# Patient Record
Sex: FEMALE | Race: WHITE | NOT HISPANIC OR LATINO | Employment: OTHER | URBAN - METROPOLITAN AREA
[De-identification: names, ages, dates, MRNs, and addresses within clinical notes are randomized per-mention and may not be internally consistent; named-entity substitution may affect disease eponyms.]

---

## 2017-07-31 DIAGNOSIS — D64.9 ANEMIA, UNSPECIFIED TYPE: Primary | ICD-10-CM

## 2017-08-02 ENCOUNTER — TELEPHONE (OUTPATIENT)
Dept: FAMILY MEDICINE CLINIC | Facility: CLINIC | Age: 72
End: 2017-08-02

## 2017-08-02 NOTE — TELEPHONE ENCOUNTER
Spoke with patient she is going to [place] in [facility] to do the additional test

## 2017-08-07 RX ORDER — ATENOLOL AND CHLORTHALIDONE TABLET 50; 25 MG/1; MG/1
TABLET ORAL
Qty: 45 TABLET | Refills: 1 | Status: SHIPPED | OUTPATIENT
Start: 2017-08-07 | End: 2018-04-18 | Stop reason: SDUPTHER

## 2017-08-08 ENCOUNTER — TELEPHONE (OUTPATIENT)
Dept: FAMILY MEDICINE CLINIC | Facility: CLINIC | Age: 72
End: 2017-08-08

## 2017-08-09 DIAGNOSIS — Z12.11 COLON CANCER SCREENING: Primary | ICD-10-CM

## 2017-08-11 ENCOUNTER — LAB (OUTPATIENT)
Dept: LAB | Facility: HOSPITAL | Age: 72
End: 2017-08-11

## 2017-08-11 DIAGNOSIS — Z12.11 COLON CANCER SCREENING: ICD-10-CM

## 2017-08-11 LAB
COLLECT DATE SP2 STL: NORMAL
COLLECT DATE SP3 STL: NORMAL
COLLECT DATE STL: NORMAL
HEMOCCULT STL QL: NEGATIVE
Lab: 1100
Lab: 1100
Lab: 800

## 2017-08-11 PROCEDURE — 82270 OCCULT BLOOD FECES: CPT | Performed by: INTERNAL MEDICINE

## 2018-01-31 ENCOUNTER — TELEPHONE (OUTPATIENT)
Dept: FAMILY MEDICINE CLINIC | Facility: CLINIC | Age: 73
End: 2018-01-31

## 2018-01-31 NOTE — TELEPHONE ENCOUNTER
[facility]    [phone]  [phone]      OMEPRAZOLE 20 MG ONCE DAILY    AUTO RENEW  90 QTY PLEASE

## 2018-01-31 NOTE — TELEPHONE ENCOUNTER
Pt not seen in over a year.  Advised will need an appointment for refills.  She states she will call back to schedule at another time.

## 2018-02-09 ENCOUNTER — TELEPHONE (OUTPATIENT)
Dept: FAMILY MEDICINE CLINIC | Facility: CLINIC | Age: 73
End: 2018-02-09

## 2018-02-09 NOTE — TELEPHONE ENCOUNTER
Received request from [facility] for refill on Atenolol.  Called pt to schedue appointment.  She requested we not send in refills.    She will call at later date to get appointment and will fill at that time.  Pt states she has a supply still.

## 2018-04-18 ENCOUNTER — LAB (OUTPATIENT)
Dept: LAB | Facility: HOSPITAL | Age: 73
End: 2018-04-18

## 2018-04-18 ENCOUNTER — OFFICE VISIT (OUTPATIENT)
Dept: FAMILY MEDICINE CLINIC | Facility: CLINIC | Age: 73
End: 2018-04-18

## 2018-04-18 DIAGNOSIS — D64.9 ANEMIA, UNSPECIFIED TYPE: Primary | ICD-10-CM

## 2018-04-18 DIAGNOSIS — R73.01 IMPAIRED FASTING GLUCOSE: ICD-10-CM

## 2018-04-18 DIAGNOSIS — E66.9 OBESITY (BMI 30-39.9): ICD-10-CM

## 2018-04-18 DIAGNOSIS — I10 HYPERTENSION, UNSPECIFIED TYPE: ICD-10-CM

## 2018-04-18 DIAGNOSIS — M06.9 RHEUMATOID ARTHRITIS, INVOLVING UNSPECIFIED SITE, UNSPECIFIED RHEUMATOID FACTOR PRESENCE: ICD-10-CM

## 2018-04-18 DIAGNOSIS — D64.9 ANEMIA, UNSPECIFIED TYPE: ICD-10-CM

## 2018-04-18 DIAGNOSIS — N18.30 CKD (CHRONIC KIDNEY DISEASE), STAGE III (HCC): ICD-10-CM

## 2018-04-18 LAB
DEPRECATED RDW RBC AUTO: 58.1 FL (ref 37–54)
ERYTHROCYTE [DISTWIDTH] IN BLOOD BY AUTOMATED COUNT: 20.1 % (ref 11.3–14.5)
FERRITIN SERPL-MCNC: 8 NG/ML (ref 10–291)
HCT VFR BLD AUTO: 34.9 % (ref 34.5–44)
HGB BLD-MCNC: 10.2 G/DL (ref 11.5–15.5)
IRON 24H UR-MRATE: 21 MCG/DL (ref 50–175)
IRON SATN MFR SERPL: 5 % (ref 15–50)
MCH RBC QN AUTO: 23.3 PG (ref 27–31)
MCHC RBC AUTO-ENTMCNC: 29.2 G/DL (ref 32–36)
MCV RBC AUTO: 79.7 FL (ref 80–99)
PLATELET # BLD AUTO: 328 10*3/MM3 (ref 150–450)
PMV BLD AUTO: 10.4 FL (ref 6–12)
RBC # BLD AUTO: 4.38 10*6/MM3 (ref 3.89–5.14)
TIBC SERPL-MCNC: 387 MCG/DL (ref 250–450)
WBC NRBC COR # BLD: 6.44 10*3/MM3 (ref 3.5–10.8)

## 2018-04-18 PROCEDURE — 83540 ASSAY OF IRON: CPT

## 2018-04-18 PROCEDURE — 83550 IRON BINDING TEST: CPT

## 2018-04-18 PROCEDURE — 36415 COLL VENOUS BLD VENIPUNCTURE: CPT

## 2018-04-18 PROCEDURE — 85027 COMPLETE CBC AUTOMATED: CPT

## 2018-04-18 PROCEDURE — 82728 ASSAY OF FERRITIN: CPT

## 2018-04-18 PROCEDURE — 99214 OFFICE O/P EST MOD 30 MIN: CPT | Performed by: INTERNAL MEDICINE

## 2018-04-18 RX ORDER — OMEPRAZOLE 20 MG/1
20 CAPSULE, DELAYED RELEASE ORAL DAILY
Qty: 90 CAPSULE | Refills: 1 | Status: SHIPPED | OUTPATIENT
Start: 2018-04-18 | End: 2018-10-09 | Stop reason: SDUPTHER

## 2018-04-18 RX ORDER — FOLIC ACID 1 MG/1
1 TABLET ORAL DAILY
Refills: 0 | COMMUNITY
Start: 2018-03-01

## 2018-04-18 RX ORDER — MELATONIN
1000 DAILY
COMMUNITY
End: 2019-10-21

## 2018-04-18 RX ORDER — VITAMIN E 268 MG
400 CAPSULE ORAL DAILY
COMMUNITY
End: 2019-01-16

## 2018-04-18 RX ORDER — SENNOSIDES 8.6 MG
650 CAPSULE ORAL 2 TIMES DAILY
Status: ON HOLD | COMMUNITY
End: 2022-08-07 | Stop reason: SDUPTHER

## 2018-04-18 RX ORDER — ATENOLOL AND CHLORTHALIDONE TABLET 50; 25 MG/1; MG/1
0.5 TABLET ORAL DAILY
Qty: 45 TABLET | Refills: 1 | Status: SHIPPED | OUTPATIENT
Start: 2018-04-18 | End: 2018-11-01 | Stop reason: SDUPTHER

## 2018-04-18 RX ORDER — HYDROXYCHLOROQUINE SULFATE 200 MG/1
200 TABLET, FILM COATED ORAL 2 TIMES DAILY
Refills: 1 | COMMUNITY
Start: 2018-03-28

## 2018-04-18 RX ORDER — OMEPRAZOLE 20 MG/1
20 CAPSULE, DELAYED RELEASE ORAL DAILY
COMMUNITY
End: 2018-04-18 | Stop reason: SDUPTHER

## 2018-04-18 NOTE — PROGRESS NOTES
73F here to establish care.    Current issues:  -iron def anemia - told by her rheumatologist's office last couple of times bloodowork done.  8/17 occult stool cards x 3 done and negative.  No melena or blood in stool, gerd, dyspepsia, abd pain, n/v  Has fatigue, peeling nails, and chews on ice often (x years)  No etoh or nsaids    -htn - on losartan in past - bp went low, cut med down to not taking it at all  Home readings: 120s/60s  Taking 1/2 tab of the atenolol-chorthalidone    -RA - est with Dr. Diamond, maintained with good control on methotrexate and plaquenil  Eye exam utd  Labs done 3/18    Review of Systems   General: + fatigue, no fever/chills, unintentional wt loss, malaise, night sweats  Skin: no rash, no hives, no lesions,   Eyes: no visual disturbance   Heme: no brusing, no bleeding  ENT: no hearing loss, no dizziness, no nosebleed, no hoarseness  Endocrine: , no polyuria, polyphagia, polydipsia, no heat or cold intolerance  GI: no nausea, no vomiting, no diarrhea, no constipation, no bleeding, no pain  : no dysuria, no urinary frequency,, no hematuria, or incontinence  Extremities: no edema, , no claudication  Cardiac: no chest pain, no palpitations, no orthopnea, no PND  Respiratory: no cough, no sputum, no wheezing, no sob , no hemoptysis  Neuro: no headache, no seizure,, no paresthesias or weakness  Psych: no anxiety, no depression          Patient Active Problem List    Diagnosis   • Obesity (BMI 30-39.9) [E66.9]   • CKD (chronic kidney disease), stage III [N18.3]   • Rheumatoid arthritis [M06.9]     Overview Note:     4/18:RA - Dr. Diamond - 2010 dx - all joints hurt, shoulders most - methotrexate  Plaquenil added 2016. Sx are well controlled      • Osteoarthritis [M19.90]   • Impaired fasting glucose [R73.01]   • Hypertension [I10]   • GERD (gastroesophageal reflux disease) [K21.9]     Overview Note:     omeprazole     • Esophageal stricture [K22.2]   • Asymptomatic bacteriuria [R82.71]     Past  "Surgical History:   Procedure Laterality Date   • REPLACEMENT TOTAL KNEE BILATERAL      2004, 2008   • TOTAL HIP ARTHROPLASTY  2014    right     Current Outpatient Prescriptions   Medication Sig Dispense Refill   • acetaminophen (TYLENOL) 650 MG 8 hr tablet Take 650 mg by mouth 3 (Three) Times a Day As Needed for Mild Pain .     • atenolol-chlorthalidone (TENORETIC) 50-25 MG per tablet Take 0.5 tablets by mouth Daily. 45 tablet 1   • cholecalciferol (VITAMIN D3) 1000 units tablet Take 1,000 Units by mouth Daily.     • folic acid (FOLVITE) 1 MG tablet Take 1 mg by mouth Daily.  0   • hydroxychloroquine (PLAQUENIL) 200 MG tablet 200 mg 2 (Two) Times a Day.  1   • methotrexate 2.5 MG tablet Take 15 mg by mouth 1 (One) Time Per Week.  1   • omeprazole (priLOSEC) 20 MG capsule Take 1 capsule by mouth Daily. 90 capsule 1   • vitamin E 400 UNIT capsule Take 400 Units by mouth Daily.       No current facility-administered medications for this visit.      Allergies   Allergen Reactions   • Tramadol Nausea Only     Social History     Social History   • Marital status:      Spouse name: [facility]   • Number of children: 2     Occupational History   • Retired           Social History Main Topics   • Smoking status: Former Smoker     Packs/day: 1.00     Years: 20.00   • Smokeless tobacco: Former User     Quit date: 4/18/1985   • Alcohol use Yes      Comment: rare    • Drug use: No     Other Topics Concern   • Not on file     Social History Narrative    4/18:     to [facility] 45yrs    2 kids:    Yamil Muro    5 gk    Hobbies: grandkids    Exercise: none     Family History   Problem Relation Age of Onset   • Stroke Mother    • Lung cancer Father    • No Known Problems Sister      /86   Pulse 64   Ht 175.3 cm (69\")   Wt 122 kg (270 lb)   SpO2 99%   BMI 39.87 kg/m²   Gen: well appearing in nad, no resp effort  Eyes: conjunctiva clear, perrl, eomi  ENT: mmm, no thyromegaly, no " lymphadenopathy  CV: s1, s2 reg no m/r/g, no bruits, no jvd  No peripheral edema, pedal pulses intact  Resp:  clear b/l no w/r/r  GI:  soft nt/nd  Skin: no clubbing or cyanosis  Neuro: no focal deficits.      A/P    1. Anemia, unspecified type    2. Hypertension, unspecified type    3. Obesity (BMI 30-39.9)    4. Impaired fasting glucose    5. CKD (chronic kidney disease), stage III    6. Rheumatoid arthritis, involving unspecified site, unspecified rheumatoid factor presence        Labs today to further assess iron deficiency anemia/ confirmation  Will obtaine prior rheum labs for comparison    BP well controlled at home - no change in med mgmt at this time, pt iwll bring cuff next visit here    Fasting labs in 6mo at [facility]    Counseled on ckdIII , control of bp, prevention of diabetes (pt prediabetic)  Counseled on importance of exercise, wt loss    RA stable on current meds.

## 2018-04-19 VITALS
BODY MASS INDEX: 36.14 KG/M2 | WEIGHT: 244 LBS | OXYGEN SATURATION: 99 % | HEIGHT: 69 IN | HEART RATE: 64 BPM | SYSTOLIC BLOOD PRESSURE: 138 MMHG | DIASTOLIC BLOOD PRESSURE: 86 MMHG

## 2018-07-16 RX ORDER — FERROUS GLUCONATE 324(37.5)
TABLET ORAL 2 TIMES DAILY WITH MEALS
COMMUNITY
Start: 2018-04-19 | End: 2019-10-21

## 2018-07-17 ENCOUNTER — LAB (OUTPATIENT)
Dept: LAB | Facility: HOSPITAL | Age: 73
End: 2018-07-17

## 2018-07-17 ENCOUNTER — OFFICE VISIT (OUTPATIENT)
Dept: FAMILY MEDICINE CLINIC | Facility: CLINIC | Age: 73
End: 2018-07-17

## 2018-07-17 VITALS
SYSTOLIC BLOOD PRESSURE: 144 MMHG | DIASTOLIC BLOOD PRESSURE: 70 MMHG | WEIGHT: 244 LBS | BODY MASS INDEX: 36.03 KG/M2 | RESPIRATION RATE: 16 BRPM | HEART RATE: 56 BPM

## 2018-07-17 DIAGNOSIS — D50.9 IRON DEFICIENCY ANEMIA, UNSPECIFIED IRON DEFICIENCY ANEMIA TYPE: ICD-10-CM

## 2018-07-17 DIAGNOSIS — N18.30 CKD (CHRONIC KIDNEY DISEASE), STAGE III (HCC): ICD-10-CM

## 2018-07-17 DIAGNOSIS — I10 HYPERTENSION, UNSPECIFIED TYPE: ICD-10-CM

## 2018-07-17 DIAGNOSIS — D64.9 ANEMIA, UNSPECIFIED TYPE: ICD-10-CM

## 2018-07-17 DIAGNOSIS — M06.9 RHEUMATOID ARTHRITIS, INVOLVING UNSPECIFIED SITE, UNSPECIFIED RHEUMATOID FACTOR PRESENCE: Primary | ICD-10-CM

## 2018-07-17 LAB
ALBUMIN SERPL-MCNC: 3.98 G/DL (ref 3.2–4.8)
ALBUMIN/GLOB SERPL: 1.8 G/DL (ref 1.5–2.5)
ALP SERPL-CCNC: 59 U/L (ref 25–100)
ALT SERPL W P-5'-P-CCNC: 21 U/L (ref 7–40)
ANION GAP SERPL CALCULATED.3IONS-SCNC: 9 MMOL/L (ref 3–11)
AST SERPL-CCNC: 25 U/L (ref 0–33)
BASOPHILS # BLD AUTO: 0.04 10*3/MM3 (ref 0–0.2)
BASOPHILS NFR BLD AUTO: 0.8 % (ref 0–1)
BILIRUB SERPL-MCNC: 0.6 MG/DL (ref 0.3–1.2)
BUN BLD-MCNC: 12 MG/DL (ref 9–23)
BUN/CREAT SERPL: 12.5 (ref 7–25)
CALCIUM SPEC-SCNC: 10 MG/DL (ref 8.7–10.4)
CHLORIDE SERPL-SCNC: 101 MMOL/L (ref 99–109)
CO2 SERPL-SCNC: 30 MMOL/L (ref 20–31)
CREAT BLD-MCNC: 0.96 MG/DL (ref 0.6–1.3)
DEPRECATED RDW RBC AUTO: 61.5 FL (ref 37–54)
EOSINOPHIL # BLD AUTO: 0.1 10*3/MM3 (ref 0–0.3)
EOSINOPHIL NFR BLD AUTO: 2 % (ref 0–3)
ERYTHROCYTE [DISTWIDTH] IN BLOOD BY AUTOMATED COUNT: 18.9 % (ref 11.3–14.5)
FERRITIN SERPL-MCNC: 37 NG/ML (ref 10–291)
GFR SERPL CREATININE-BSD FRML MDRD: 57 ML/MIN/1.73
GLOBULIN UR ELPH-MCNC: 2.2 GM/DL
GLUCOSE BLD-MCNC: 99 MG/DL (ref 70–100)
HCT VFR BLD AUTO: 42.2 % (ref 34.5–44)
HGB BLD-MCNC: 12.9 G/DL (ref 11.5–15.5)
IMM GRANULOCYTES # BLD: 0 10*3/MM3 (ref 0–0.03)
IMM GRANULOCYTES NFR BLD: 0 % (ref 0–0.6)
IRON 24H UR-MRATE: 112 MCG/DL (ref 50–175)
IRON SATN MFR SERPL: 37 % (ref 15–50)
LYMPHOCYTES # BLD AUTO: 0.9 10*3/MM3 (ref 0.6–4.8)
LYMPHOCYTES NFR BLD AUTO: 18.3 % (ref 24–44)
MCH RBC QN AUTO: 27.5 PG (ref 27–31)
MCHC RBC AUTO-ENTMCNC: 30.6 G/DL (ref 32–36)
MCV RBC AUTO: 90 FL (ref 80–99)
MONOCYTES # BLD AUTO: 0.22 10*3/MM3 (ref 0–1)
MONOCYTES NFR BLD AUTO: 4.5 % (ref 0–12)
NEUTROPHILS # BLD AUTO: 3.66 10*3/MM3 (ref 1.5–8.3)
NEUTROPHILS NFR BLD AUTO: 74.4 % (ref 41–71)
PLATELET # BLD AUTO: 206 10*3/MM3 (ref 150–450)
PMV BLD AUTO: 10.9 FL (ref 6–12)
POTASSIUM BLD-SCNC: 4.2 MMOL/L (ref 3.5–5.5)
PROT SERPL-MCNC: 6.2 G/DL (ref 5.7–8.2)
RBC # BLD AUTO: 4.69 10*6/MM3 (ref 3.89–5.14)
SODIUM BLD-SCNC: 140 MMOL/L (ref 132–146)
TIBC SERPL-MCNC: 299 MCG/DL (ref 250–450)
WBC NRBC COR # BLD: 4.92 10*3/MM3 (ref 3.5–10.8)

## 2018-07-17 PROCEDURE — 82728 ASSAY OF FERRITIN: CPT

## 2018-07-17 PROCEDURE — 36415 COLL VENOUS BLD VENIPUNCTURE: CPT

## 2018-07-17 PROCEDURE — 99214 OFFICE O/P EST MOD 30 MIN: CPT | Performed by: INTERNAL MEDICINE

## 2018-07-17 PROCEDURE — 80053 COMPREHEN METABOLIC PANEL: CPT

## 2018-07-17 PROCEDURE — 83550 IRON BINDING TEST: CPT

## 2018-07-17 PROCEDURE — 85025 COMPLETE CBC W/AUTO DIFF WBC: CPT

## 2018-07-17 PROCEDURE — 83540 ASSAY OF IRON: CPT

## 2018-07-17 NOTE — PROGRESS NOTES
73/F here for f/u iron def anemia.  Told anemic by rheum back in 3/18 - labs scanned, mild.  We did labs in 4/18 c/w iron deficiency.  ADvised colonoscopy, pt declines.  Started on iron supplement ferrous gluconate bid and tolerating.  Feels more energy on this.          Review of Systems   Constitutional: Positive for fatigue. Negative for chills, fever and unexpected weight loss.   HENT: Positive for postnasal drip.    Eyes: Negative for visual disturbance.   Respiratory: Negative for cough, chest tightness, shortness of breath and wheezing.    Cardiovascular: Positive for leg swelling. Negative for chest pain and palpitations.   Gastrointestinal: Positive for diarrhea. Negative for abdominal pain, blood in stool, constipation, nausea, vomiting and GERD.   Genitourinary: Negative for urinary incontinence, frequency, hematuria and urgency.   Musculoskeletal: Positive for arthralgias. Negative for joint swelling, myalgias and neck pain.   Skin: Negative.    Neurological: Negative for dizziness and weakness.   Psychiatric/Behavioral: Negative for sleep disturbance and depressed mood. The patient is not nervous/anxious.      Patient Active Problem List   Diagnosis   • Rheumatoid arthritis (CMS/HCC)   • Osteoarthritis   • Impaired fasting glucose   • Hypertension   • GERD (gastroesophageal reflux disease)   • Esophageal stricture   • Asymptomatic bacteriuria   • Obesity (BMI 30-39.9)   • CKD (chronic kidney disease), stage III      Past Surgical History:   Procedure Laterality Date   • REPLACEMENT TOTAL KNEE BILATERAL      2004, 2008   • TOTAL HIP ARTHROPLASTY  2014    right        Current Outpatient Prescriptions:   •  acetaminophen (TYLENOL) 650 MG 8 hr tablet, Take 650 mg by mouth 3 (Three) Times a Day As Needed for Mild Pain ., Disp: , Rfl:   •  aspirin 81 MG oral suspension, , Disp: , Rfl:   •  atenolol-chlorthalidone (TENORETIC) 50-25 MG per tablet, Take 0.5 tablets by mouth Daily., Disp: 45 tablet, Rfl: 1  •   cholecalciferol (VITAMIN D3) 1000 units tablet, Take 1,000 Units by mouth Daily., Disp: , Rfl:   •  Cyanocobalamin (VITAMIN B-12) 6000 MCG sublingual tablet, , Disp: , Rfl:   •  ferrous gluconate 324 (37.5 Fe) MG tablet tablet, , Disp: , Rfl:   •  folic acid (FOLVITE) 1 MG tablet, Take 1 mg by mouth Daily., Disp: , Rfl: 0  •  hydroxychloroquine (PLAQUENIL) 200 MG tablet, 200 mg 2 (Two) Times a Day., Disp: , Rfl: 1  •  methotrexate 2.5 MG tablet, Take 15 mg by mouth 1 (One) Time Per Week., Disp: , Rfl: 1  •  omeprazole (priLOSEC) 20 MG capsule, Take 1 capsule by mouth Daily., Disp: 90 capsule, Rfl: 1  •  vitamin E 400 UNIT capsule, Take 400 Units by mouth Daily., Disp: , Rfl:    Allergies   Allergen Reactions   • Tramadol Nausea Only     Social History     Social History   • Marital status:      Spouse name: [facility]   • Number of children: 2   • Years of education: N/A     Occupational History   • Retired           Social History Main Topics   • Smoking status: Former Smoker     Packs/day: 1.00     Years: 20.00   • Smokeless tobacco: Former User     Quit date: 4/18/1985   • Alcohol use Yes      Comment: rare    • Drug use: No   • Sexual activity: Not on file     Other Topics Concern   • Not on file     Social History Narrative    4/18:     to [facility] 45yrs    2 kids:    Yamil Muro    5 gk    Hobbies: grandkids    Exercise: none      Family History   Problem Relation Age of Onset   • Stroke Mother    • Lung cancer Father    • No Known Problems Sister       /70 (BP Location: Left arm, Patient Position: Sitting)   Pulse 56 Comment: regular  Resp 16 Comment: normal  Wt 111 kg (244 lb)   BMI 36.03 kg/m²   Gen: well appearing in nad, no resp effort  Eyes: conjunctiva clear, perrl, eomi  ENT: mmm, no thyromegaly, no lymphadenopathy  CV: s1, s2 reg no m/r/g, no bruits, no jvd  No peripheral edema, pedal pulses intact  Resp:  clear b/l no w/r/r  GI:  soft nt/nd  Skin: no clubbing  or cyanosis  Neuro: no focal deficits.    WBC   Date Value Ref Range Status   04/18/2018 6.44 3.50 - 10.80 10*3/mm3 Final     RBC   Date Value Ref Range Status   04/18/2018 4.38 3.89 - 5.14 10*6/mm3 Final     Hemoglobin   Date Value Ref Range Status   04/18/2018 10.2 (L) 11.5 - 15.5 g/dL Final     Hematocrit   Date Value Ref Range Status   04/18/2018 34.9 34.5 - 44.0 % Final     MCV   Date Value Ref Range Status   04/18/2018 79.7 (L) 80.0 - 99.0 fL Final     MCH   Date Value Ref Range Status   04/18/2018 23.3 (L) 27.0 - 31.0 pg Final     MCHC   Date Value Ref Range Status   04/18/2018 29.2 (L) 32.0 - 36.0 g/dL Final     RDW   Date Value Ref Range Status   04/18/2018 20.1 (H) 11.3 - 14.5 % Final     RDW-SD   Date Value Ref Range Status   04/18/2018 58.1 (H) 37.0 - 54.0 fl Final     MPV   Date Value Ref Range Status   04/18/2018 10.4 6.0 - 12.0 fL Final     Platelets   Date Value Ref Range Status   04/18/2018 328 150 - 450 10*3/mm3 Final         A/P    1. Rheumatoid arthritis, involving unspecified site, unspecified rheumatoid factor presence (CMS/HCC)    2. CKD (chronic kidney disease), stage III    3. Hypertension, unspecified type    4. Anemia, unspecified type    5. Iron deficiency anemia, unspecified iron deficiency anemia type        Anemia - improved fatigue on iron, will rept levels  REviewed recommendation for colonoscopy , pt declines - will do stool cards.  Cont iron supp

## 2018-08-15 ENCOUNTER — CLINICAL SUPPORT (OUTPATIENT)
Dept: FAMILY MEDICINE CLINIC | Facility: CLINIC | Age: 73
End: 2018-08-15

## 2018-08-15 DIAGNOSIS — Z12.11 ENCOUNTER FOR SCREENING FECAL OCCULT BLOOD TESTING: Primary | ICD-10-CM

## 2018-08-15 LAB — HEMOCCULT STL QL IA: NEGATIVE

## 2018-08-15 PROCEDURE — 82270 OCCULT BLOOD FECES: CPT | Performed by: INTERNAL MEDICINE

## 2018-10-09 RX ORDER — OMEPRAZOLE 20 MG/1
20 CAPSULE, DELAYED RELEASE ORAL DAILY
Qty: 90 CAPSULE | Refills: 1 | Status: SHIPPED | OUTPATIENT
Start: 2018-10-09 | End: 2019-01-16 | Stop reason: SDUPTHER

## 2018-11-01 RX ORDER — ATENOLOL AND CHLORTHALIDONE TABLET 50; 25 MG/1; MG/1
TABLET ORAL
Qty: 45 TABLET | Refills: 1 | Status: SHIPPED | OUTPATIENT
Start: 2018-11-01 | End: 2019-01-16 | Stop reason: SDUPTHER

## 2018-11-08 ENCOUNTER — APPOINTMENT (OUTPATIENT)
Dept: CT IMAGING | Facility: HOSPITAL | Age: 73
End: 2018-11-08

## 2018-11-08 ENCOUNTER — HOSPITAL ENCOUNTER (EMERGENCY)
Facility: HOSPITAL | Age: 73
Discharge: HOME | End: 2018-11-08
Attending: EMERGENCY MEDICINE | Admitting: EMERGENCY MEDICINE

## 2018-11-08 VITALS
OXYGEN SATURATION: 98 % | TEMPERATURE: 97.5 F | RESPIRATION RATE: 18 BRPM | DIASTOLIC BLOOD PRESSURE: 66 MMHG | HEIGHT: 70 IN | SYSTOLIC BLOOD PRESSURE: 145 MMHG | HEART RATE: 59 BPM | BODY MASS INDEX: 35.07 KG/M2 | WEIGHT: 245 LBS
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
## 2018-11-08 DIAGNOSIS — S00.83XA CONTUSION OF FACE, INITIAL ENCOUNTER: ICD-10-CM

## 2018-11-08 DIAGNOSIS — S02.2XXA CLOSED FRACTURE OF NASAL BONE, INITIAL ENCOUNTER: ICD-10-CM

## 2018-11-08 LAB
HOLD SPECIMEN: NORMAL
HOLD SPECIMEN: NORMAL
WHOLE BLOOD HOLD SPECIMEN: NORMAL
WHOLE BLOOD HOLD SPECIMEN: NORMAL

## 2018-11-08 PROCEDURE — 70450 CT HEAD/BRAIN W/O DYE: CPT

## 2018-11-08 PROCEDURE — 93005 ELECTROCARDIOGRAM TRACING: CPT | Performed by: EMERGENCY MEDICINE

## 2018-11-08 PROCEDURE — 72125 CT NECK SPINE W/O DYE: CPT

## 2018-11-08 PROCEDURE — 70486 CT MAXILLOFACIAL W/O DYE: CPT

## 2018-11-08 PROCEDURE — 99283 EMERGENCY DEPT VISIT LOW MDM: CPT

## 2018-11-08 RX ORDER — AZITHROMYCIN 250 MG/1
250 TABLET, FILM COATED ORAL DAILY
Qty: 6 TABLET | Refills: 0 | Status: SHIPPED | OUTPATIENT
Start: 2018-11-08 | End: 2019-01-16

## 2018-11-08 RX ORDER — SODIUM CHLORIDE 0.9 % (FLUSH) 0.9 %
10 SYRINGE (ML) INJECTION AS NEEDED
Status: DISCONTINUED | OUTPATIENT
Start: 2018-11-08 | End: 2018-11-08 | Stop reason: HOSPADM

## 2018-11-08 NOTE — ED PROVIDER NOTES
"Subjective   73 year old female presenting with fall. She states that prior to arrival she was with her  at his doctors appointment, she tripped on a curb. She remembers falling and everything thereafter is \"foggy\".  She complains of facial pain and headache.  She denies any numbness, weakness, neck pain, chest pain or shortness of breath.  She is not on blood thinners.            Review of Systems   Constitutional: Negative.    Eyes: Negative.    Respiratory: Negative.    Cardiovascular: Negative.    Gastrointestinal: Negative.    Genitourinary: Negative.    Musculoskeletal: Negative.    Skin: Positive for wound.   Neurological: Positive for headaches. Negative for dizziness, seizures, syncope, facial asymmetry, speech difficulty, weakness, light-headedness and numbness.   Psychiatric/Behavioral: Negative.        Past Medical History:   Diagnosis Date   • Asymptomatic bacteriuria    • Esophageal stricture    • GERD (gastroesophageal reflux disease)    • Hypertension    • Impaired fasting glucose    • Osteoarthritis    • Rheumatoid arthritis (CMS/[facility])        Allergies   Allergen Reactions   • Tramadol Nausea Only       Past Surgical History:   Procedure Laterality Date   • REPLACEMENT TOTAL KNEE BILATERAL      2004, 2008   • TOTAL HIP ARTHROPLASTY  2014    right       Family History   Problem Relation Age of Onset   • Stroke Mother    • Lung cancer Father    • No Known Problems Sister        Social History     Social History   • Marital status:      Spouse name: [facility]   • Number of children: 2     Occupational History   • Retired           Social History Main Topics   • Smoking status: Former Smoker     Packs/day: 1.00     Years: 20.00   • Smokeless tobacco: Former User     Quit date: 4/18/1985   • Alcohol use Yes      Comment: rare    • Drug use: No     Other Topics Concern   • Not on file     Social History Narrative    4/18:     to [facility] 43yrs    2 kids:    Yamil Shaver " Jina    5 gk    Hobbies: grandkids    Exercise: none           Objective   Physical Exam   Constitutional: She is oriented to person, place, and time. She appears well-developed and well-nourished. No distress.   HENT:   Head: Normocephalic.   Right Ear: External ear normal.   Left Ear: External ear normal.   Nose: Nose normal.   Mouth/Throat: Oropharynx is clear and moist.   Lower lip is contused and swollen, midface is stable, occlusion is normal, no septal hematomas   Eyes: Pupils are equal, round, and reactive to light. Conjunctivae and EOM are normal.   Neck: Normal range of motion. Neck supple.   No midline tenderness, full range of motion   Cardiovascular: Normal rate, regular rhythm, normal heart sounds and intact distal pulses.    Pulmonary/Chest: Effort normal and breath sounds normal. No respiratory distress.   Abdominal: Soft. Bowel sounds are normal. She exhibits no distension. There is no tenderness. There is no rebound and no guarding.   Musculoskeletal: Normal range of motion. She exhibits no edema, tenderness or deformity.   All extremities/joints stable without tenderness   Neurological: She is alert and oriented to person, place, and time.   Normal strength and sensation, CN intact   Skin: Skin is warm and dry. No rash noted.   Psychiatric: She has a normal mood and affect. Her behavior is normal.   Nursing note and vitals reviewed.      Procedures           ED Course                  MDM  Number of Diagnoses or Management Options  Closed fracture of nasal bone, initial encounter:   Contusion of face, initial encounter:   Fall, initial encounter:   Diagnosis management comments: 73-year-old female with fall.  Well-developed, well-nourished elderly female in no distress with normal vital signs and exam as above.  We'll check imaging.  Disposition pending workup.    DDX: Fall, trauma, contusion, fracture, ICH    EKG interpreted by me: sinus rhythm, normal rate, normal axis/intervals, no acute  ST changes, non specific T wave changes, this is an abnormal EKG    Imaging notable for non displaced nasal bone fractures. No other acute findings. Will discharge home.       Amount and/or Complexity of Data Reviewed  Tests in the radiology section of CPT®: reviewed          Final diagnoses:   Fall, initial encounter   Contusion of face, initial encounter   Closed fracture of nasal bone, initial encounter            Huy Mcconnell MD  11/08/18 1544

## 2019-01-16 ENCOUNTER — OFFICE VISIT (OUTPATIENT)
Dept: FAMILY MEDICINE CLINIC | Facility: CLINIC | Age: 74
End: 2019-01-16

## 2019-01-16 ENCOUNTER — LAB (OUTPATIENT)
Dept: LAB | Facility: HOSPITAL | Age: 74
End: 2019-01-16

## 2019-01-16 VITALS
DIASTOLIC BLOOD PRESSURE: 84 MMHG | BODY MASS INDEX: 35.15 KG/M2 | SYSTOLIC BLOOD PRESSURE: 126 MMHG | HEIGHT: 70 IN | OXYGEN SATURATION: 99 % | HEART RATE: 56 BPM

## 2019-01-16 DIAGNOSIS — M06.9 RHEUMATOID ARTHRITIS, INVOLVING UNSPECIFIED SITE, UNSPECIFIED RHEUMATOID FACTOR PRESENCE: ICD-10-CM

## 2019-01-16 DIAGNOSIS — N18.30 CKD (CHRONIC KIDNEY DISEASE), STAGE III (HCC): ICD-10-CM

## 2019-01-16 DIAGNOSIS — D50.9 IRON DEFICIENCY ANEMIA, UNSPECIFIED IRON DEFICIENCY ANEMIA TYPE: ICD-10-CM

## 2019-01-16 DIAGNOSIS — R73.01 IMPAIRED FASTING GLUCOSE: ICD-10-CM

## 2019-01-16 DIAGNOSIS — K21.9 GASTROESOPHAGEAL REFLUX DISEASE, ESOPHAGITIS PRESENCE NOT SPECIFIED: ICD-10-CM

## 2019-01-16 DIAGNOSIS — I10 HYPERTENSION, UNSPECIFIED TYPE: ICD-10-CM

## 2019-01-16 DIAGNOSIS — D50.9 IRON DEFICIENCY ANEMIA, UNSPECIFIED IRON DEFICIENCY ANEMIA TYPE: Primary | ICD-10-CM

## 2019-01-16 LAB
ALBUMIN SERPL-MCNC: 4.28 G/DL (ref 3.2–4.8)
ALBUMIN/GLOB SERPL: 2.2 G/DL (ref 1.5–2.5)
ALP SERPL-CCNC: 56 U/L (ref 25–100)
ALT SERPL W P-5'-P-CCNC: 26 U/L (ref 7–40)
ANION GAP SERPL CALCULATED.3IONS-SCNC: 7 MMOL/L (ref 3–11)
AST SERPL-CCNC: 25 U/L (ref 0–33)
BILIRUB SERPL-MCNC: 0.7 MG/DL (ref 0.3–1.2)
BUN BLD-MCNC: 20 MG/DL (ref 9–23)
BUN/CREAT SERPL: 17.2 (ref 7–25)
CALCIUM SPEC-SCNC: 9.9 MG/DL (ref 8.7–10.4)
CHLORIDE SERPL-SCNC: 100 MMOL/L (ref 99–109)
CO2 SERPL-SCNC: 31 MMOL/L (ref 20–31)
CREAT BLD-MCNC: 1.16 MG/DL (ref 0.6–1.3)
DEPRECATED RDW RBC AUTO: 51.5 FL (ref 37–54)
ERYTHROCYTE [DISTWIDTH] IN BLOOD BY AUTOMATED COUNT: 14.9 % (ref 11.3–14.5)
GFR SERPL CREATININE-BSD FRML MDRD: 46 ML/MIN/1.73
GLOBULIN UR ELPH-MCNC: 1.9 GM/DL
GLUCOSE BLD-MCNC: 98 MG/DL (ref 70–100)
HBA1C MFR BLD: 5.1 % (ref 4.8–5.6)
HCT VFR BLD AUTO: 44.4 % (ref 34.5–44)
HGB BLD-MCNC: 14.1 G/DL (ref 11.5–15.5)
IRON 24H UR-MRATE: 113 MCG/DL (ref 50–175)
IRON SATN MFR SERPL: 37 % (ref 15–50)
MCH RBC QN AUTO: 30.3 PG (ref 27–31)
MCHC RBC AUTO-ENTMCNC: 31.8 G/DL (ref 32–36)
MCV RBC AUTO: 95.5 FL (ref 80–99)
PLATELET # BLD AUTO: 279 10*3/MM3 (ref 150–450)
PMV BLD AUTO: 10.6 FL (ref 6–12)
POTASSIUM BLD-SCNC: 4.2 MMOL/L (ref 3.5–5.5)
PROT SERPL-MCNC: 6.2 G/DL (ref 5.7–8.2)
RBC # BLD AUTO: 4.65 10*6/MM3 (ref 3.89–5.14)
SODIUM BLD-SCNC: 138 MMOL/L (ref 132–146)
TIBC SERPL-MCNC: 302 MCG/DL (ref 250–450)
WBC NRBC COR # BLD: 5.73 10*3/MM3 (ref 3.5–10.8)

## 2019-01-16 PROCEDURE — 99214 OFFICE O/P EST MOD 30 MIN: CPT | Performed by: INTERNAL MEDICINE

## 2019-01-16 PROCEDURE — 83550 IRON BINDING TEST: CPT | Performed by: INTERNAL MEDICINE

## 2019-01-16 PROCEDURE — 83036 HEMOGLOBIN GLYCOSYLATED A1C: CPT | Performed by: INTERNAL MEDICINE

## 2019-01-16 PROCEDURE — 83540 ASSAY OF IRON: CPT | Performed by: INTERNAL MEDICINE

## 2019-01-16 PROCEDURE — 36415 COLL VENOUS BLD VENIPUNCTURE: CPT

## 2019-01-16 PROCEDURE — 80053 COMPREHEN METABOLIC PANEL: CPT | Performed by: INTERNAL MEDICINE

## 2019-01-16 PROCEDURE — 85027 COMPLETE CBC AUTOMATED: CPT | Performed by: INTERNAL MEDICINE

## 2019-01-16 RX ORDER — OMEPRAZOLE 20 MG/1
20 CAPSULE, DELAYED RELEASE ORAL DAILY
Qty: 90 CAPSULE | Refills: 1 | Status: SHIPPED | OUTPATIENT
Start: 2019-01-16 | End: 2019-10-04

## 2019-01-16 RX ORDER — ATENOLOL AND CHLORTHALIDONE TABLET 50; 25 MG/1; MG/1
0.5 TABLET ORAL DAILY
Qty: 45 TABLET | Refills: 1 | Status: SHIPPED | OUTPATIENT
Start: 2019-01-16 | End: 2019-10-21 | Stop reason: SDUPTHER

## 2019-01-16 NOTE — PROGRESS NOTES
73F here for f/u chronic issues.    She feels well/ doing well, no complaints.    Htn - compliant with med atenolol-chlorthalidone - home readings at goal 120s/70s    RA - est with rheum, stable on methotrexate and plaquenil.  Eye exam utd    Iron def anemia - unclear source - noted 1yr ago, repsonded nicely to iron supplementaiton.  Pt was chewing ice non-stop which led to discovery.  Pt declined colonoscopy, last one age 60.  She did comply with stool cards which were negative.  She continues iron 2 tablets daily.  Denies n/v, abd pain, blood in stool.  Stools are dark on iron.    CKDIII - had mild abnl of gfr, will f/u today, creatinine normal    Obesity - no exercise, not focused on diet and trying to lose weight at this time.    Impaired fasting glucose noted on 2018 labs.    Review of Systems   General: no fatigue, fever/chills, unintentional wt loss, malaise, night sweats  Skin: no rash, no hives, no lesions,   Eyes: no visual disturbance   Heme: no brusing, no bleeding  ENT: no hearing loss, no dizziness, no nosebleed, no hoarseness  Endocrine: , no polyuria, polyphagia, polydipsia, no heat or cold intolerance  GI: no nausea, no vomiting, no diarrhea, no constipation, no bleeding, no pain  : no dysuria, no urinary frequency,, no hematuria, or incontinence  Extremities: no edema, , no claudication  Cardiac: no chest pain, no palpitations, no orthopnea, no PND  Respiratory: no cough, no sputum, no wheezing, no sob , no hemoptysis  Neuro: no headache, no seizure,, no paresthesias or weakness  Psych: no anxiety, no depression    Patient Active Problem List   Diagnosis   • Rheumatoid arthritis (CMS/HCC)   • Osteoarthritis   • Impaired fasting glucose   • Hypertension   • GERD (gastroesophageal reflux disease)   • Esophageal stricture   • Asymptomatic bacteriuria   • Obesity (BMI 30-39.9)   • CKD (chronic kidney disease), stage III (CMS/HCC)   • Iron deficiency anemia      Past Surgical History:   Procedure  Laterality Date   • REPLACEMENT TOTAL KNEE BILATERAL      2004, 2008   • TOTAL HIP ARTHROPLASTY  2014    right        Current Outpatient Medications:   •  acetaminophen (TYLENOL) 650 MG 8 hr tablet, Take 650 mg by mouth 2 (Two) Times a Day., Disp: , Rfl:   •  aspirin 81 MG oral suspension, , Disp: , Rfl:   •  atenolol-chlorthalidone (TENORETIC) 50-25 MG per tablet, Take 0.5 tablets by mouth Daily., Disp: 45 tablet, Rfl: 1  •  cholecalciferol (VITAMIN D3) 1000 units tablet, Take 1,000 Units by mouth Daily., Disp: , Rfl:   •  Cyanocobalamin (VITAMIN B-12) 6000 MCG sublingual tablet, , Disp: , Rfl:   •  ferrous gluconate 324 (37.5 Fe) MG tablet tablet, 2 (Two) Times a Day With Meals., Disp: , Rfl:   •  folic acid (FOLVITE) 1 MG tablet, Take 1 mg by mouth Daily., Disp: , Rfl: 0  •  hydroxychloroquine (PLAQUENIL) 200 MG tablet, 200 mg 2 (Two) Times a Day., Disp: , Rfl: 1  •  methotrexate 2.5 MG tablet, Take 15 mg by mouth 1 (One) Time Per Week., Disp: , Rfl: 1  •  omeprazole (priLOSEC) 20 MG capsule, Take 1 capsule by mouth Daily., Disp: 90 capsule, Rfl: 1   Allergies   Allergen Reactions   • Tramadol Nausea Only     Social History     Socioeconomic History   • Marital status:      Spouse name: Ashu   • Number of children: 2   • Years of education: Not on file   • Highest education level: Not on file   Social Needs   • Financial resource strain: Not on file   • Food insecurity - worry: Not on file   • Food insecurity - inability: Not on file   • Transportation needs - medical: Not on file   • Transportation needs - non-medical: Not on file   Occupational History   • Occupation: Retired     Comment:    Tobacco Use   • Smoking status: Former Smoker     Packs/day: 1.00     Years: 20.00     Pack years: 20.00   • Smokeless tobacco: Former User     Quit date: 4/18/1985   Substance and Sexual Activity   • Alcohol use: Yes     Comment: rare    • Drug use: No   • Sexual activity: Not on file   Other Topics  "Concern   • Not on file   Social History Narrative    4/18:     to Ashu 45yrs    2 kids:    Yamil Muro    5 gk    Hobbies: grandkids    Exercise: none      Family History   Problem Relation Age of Onset   • Stroke Mother    • Lung cancer Father    • No Known Problems Sister       /84   Pulse 56   Ht 177.8 cm (70\")   SpO2 99%   BMI 35.15 kg/m²   Gen: well appearing in nad, no resp effort  Eyes: conjunctiva clear, perrl, eomi  ENT: mmm, no thyromegaly, no lymphadenopathy  CV: s1, s2 reg no m/r/g, no bruits, no jvd  No peripheral edema, pedal pulses intact  Resp:  clear b/l no w/r/r  GI:  soft nt/nd  Skin: no clubbing or cyanosis  Neuro: no focal deficits.    A/P  1. Iron deficiency anemia, unspecified iron deficiency anemia type  - check iron levels today, cont iron supplement at this time   2. Rheumatoid arthritis, involving unspecified site, unspecified rheumatoid factor presence (CMS/[facility]) - stable on current meds, follows with rheum   3. CKD (chronic kidney disease), stage III (CMS/HCC) - check today, minimal abnl   4. Gastroesophageal reflux disease, esophagitis presence not specified - requires ppi therapy/ omeprazole, benefits outweigh risks at this time.   5. Hypertension, unspecified type  - well controlled on current med regimen   6. Impaired fasting glucose - check A1C today, pt not fasting     Advised pt to get est with new provider in 3-6 months - pt interested in going to [place] and will call to schedule with someone.  She declines wellness/ physical.        "

## 2019-01-17 ENCOUNTER — TELEPHONE (OUTPATIENT)
Dept: FAMILY MEDICINE CLINIC | Facility: CLINIC | Age: 74
End: 2019-01-17

## 2019-01-17 DIAGNOSIS — N18.30 CKD (CHRONIC KIDNEY DISEASE) STAGE 3, GFR 30-59 ML/MIN (HCC): Primary | ICD-10-CM

## 2019-01-17 NOTE — TELEPHONE ENCOUNTER
----- Message from Estrella Posadas DO sent at 1/17/2019  9:24 AM EST -----  I just sent letter to patient over my chart.  Her GFR, renal function marker, remains lower than normal.  I would like to complete a workup by getting a renal ultrasound and a urine microalbumin - orders in.  I recommended she get established with someone and f/u on this in about 3months.    Estrella

## 2019-01-17 NOTE — TELEPHONE ENCOUNTER
ROJELIO:    Patient said that it might be a couple of months before she can get these tests done because her  is recovering from recent open heart surgery.

## 2019-01-17 NOTE — TELEPHONE ENCOUNTER
Patient called back and I gave her lab results per Dr. Posadas. Told her that US and microalbumin labs have been ordered. Advised her of Dr. Posadas's recommendations to follow-up in 3 months with new provider.    Patient verbalized understanding and said that she really wants to establish with a provider in [place].

## 2019-03-14 ENCOUNTER — OFFICE VISIT (OUTPATIENT)
Dept: INTERNAL MEDICINE | Facility: CLINIC | Age: 74
End: 2019-03-14

## 2019-03-14 VITALS
BODY MASS INDEX: 35.84 KG/M2 | HEART RATE: 63 BPM | HEIGHT: 69 IN | DIASTOLIC BLOOD PRESSURE: 82 MMHG | OXYGEN SATURATION: 97 % | SYSTOLIC BLOOD PRESSURE: 140 MMHG | WEIGHT: 242 LBS | TEMPERATURE: 97.1 F

## 2019-03-14 DIAGNOSIS — B02.9 HERPES ZOSTER WITHOUT COMPLICATION: Primary | ICD-10-CM

## 2019-03-14 DIAGNOSIS — K21.9 GASTROESOPHAGEAL REFLUX DISEASE, ESOPHAGITIS PRESENCE NOT SPECIFIED: ICD-10-CM

## 2019-03-14 DIAGNOSIS — I10 ESSENTIAL HYPERTENSION: ICD-10-CM

## 2019-03-14 PROCEDURE — 99214 OFFICE O/P EST MOD 30 MIN: CPT | Performed by: FAMILY MEDICINE

## 2019-03-14 RX ORDER — METHYLPREDNISOLONE 4 MG/1
TABLET ORAL
Qty: 21 EACH | Refills: 0 | Status: SHIPPED | OUTPATIENT
Start: 2019-03-14 | End: 2019-10-04

## 2019-03-14 RX ORDER — GABAPENTIN 100 MG/1
100 CAPSULE ORAL 3 TIMES DAILY
Qty: 90 CAPSULE | Refills: 0 | Status: SHIPPED | OUTPATIENT
Start: 2019-03-14 | End: 2019-10-21

## 2019-03-14 RX ORDER — VALACYCLOVIR HYDROCHLORIDE 1 G/1
1000 TABLET, FILM COATED ORAL 3 TIMES DAILY
Qty: 21 TABLET | Refills: 0 | Status: SHIPPED | OUTPATIENT
Start: 2019-03-14 | End: 2019-10-21

## 2019-03-14 NOTE — PROGRESS NOTES
Marisol Muro is a 74 y.o. female.    Chief Complaint   Patient presents with   • Establish Care     thinks she has shingles, right side hurts but that is where the rash is located        HPI   Patient presents today to establish care.  She reports a rash to her right side for 3 days.  She reports burning pain.  She reports the rash feels bumpy.    Patient also has a history of hypertension.  She is taking Tenoretic for this issue.  Blood pressure is primarily controlled in the office today.  She is fairly active, but does not exercise daily.    Patient also has a history of GERD.  She is taking Prilosec for this issue with good response.  She denies much heartburn or reflux on the medication.    Patient is unwilling to have pneumonia vaccines, colonoscopy, vaccine, tetanus vaccine, mammogram, and other preventative tests.    The following portions of the patient's history were reviewed and updated as appropriate: allergies, current medications, past family history, past medical history, past social history, past surgical history and problem list.     Past Medical History:   Diagnosis Date   • Asymptomatic bacteriuria    • Esophageal stricture    • GERD (gastroesophageal reflux disease)    • Hypertension    • Impaired fasting glucose    • Osteoarthritis    • Rheumatoid arthritis (CMS/[facility])        Past Surgical History:   Procedure Laterality Date   • REPLACEMENT TOTAL KNEE BILATERAL      2004, 2008   • TOTAL HIP ARTHROPLASTY  2014    right       Family History   Problem Relation Age of Onset   • Stroke Mother    • Lung cancer Father    • No Known Problems Sister        Social History     Socioeconomic History   • Marital status:      Spouse name: Ashu   • Number of children: 2   • Years of education: Not on file   • Highest education level: Not on file   Social Needs   • Financial resource strain: Not on file   • Food insecurity - worry: Not on file   • Food insecurity - inability: Not on file   •  Transportation needs - medical: Not on file   • Transportation needs - non-medical: Not on file   Occupational History   • Occupation: Retired     Comment:    Tobacco Use   • Smoking status: Former Smoker     Packs/day: 1.00     Years: 20.00     Pack years: 20.00   • Smokeless tobacco: Former User     Quit date: 4/18/1985   Substance and Sexual Activity   • Alcohol use: Yes     Comment: rare    • Drug use: No   • Sexual activity: Not on file   Other Topics Concern   • Not on file   Social History Narrative    4/18:     to Ashu 45yrs    2 kids:    Yamil Muro    5 gk    Hobbies: grandkids    Exercise: none       Allergies   Allergen Reactions   • Tramadol Nausea Only         Current Outpatient Medications:   •  acetaminophen (TYLENOL) 650 MG 8 hr tablet, Take 650 mg by mouth 2 (Two) Times a Day., Disp: , Rfl:   •  atenolol-chlorthalidone (TENORETIC) 50-25 MG per tablet, Take 0.5 tablets by mouth Daily., Disp: 45 tablet, Rfl: 1  •  cholecalciferol (VITAMIN D3) 1000 units tablet, Take 1,000 Units by mouth Daily., Disp: , Rfl:   •  Cyanocobalamin (VITAMIN B-12) 6000 MCG sublingual tablet, , Disp: , Rfl:   •  ferrous gluconate 324 (37.5 Fe) MG tablet tablet, 2 (Two) Times a Day With Meals., Disp: , Rfl:   •  folic acid (FOLVITE) 1 MG tablet, Take 1 mg by mouth Daily., Disp: , Rfl: 0  •  hydroxychloroquine (PLAQUENIL) 200 MG tablet, 200 mg 2 (Two) Times a Day., Disp: , Rfl: 1  •  methotrexate 2.5 MG tablet, Take 15 mg by mouth 1 (One) Time Per Week., Disp: , Rfl: 1  •  omeprazole (priLOSEC) 20 MG capsule, Take 1 capsule by mouth Daily., Disp: 90 capsule, Rfl: 1  •  gabapentin (NEURONTIN) 100 MG capsule, Take 1 capsule by mouth 3 (Three) Times a Day., Disp: 90 capsule, Rfl: 0  •  methylPREDNISolone (MEDROL, KAY,) 4 MG tablet, Take as directed on package instructions., Disp: 21 each, Rfl: 0  •  valACYclovir (VALTREX) 1000 MG tablet, Take 1 tablet by mouth 3 (Three) Times a Day., Disp: 21  "tablet, Rfl: 0    ROS    Review of Systems   Constitutional: Negative for chills, fatigue, fever and unexpected weight loss.   HENT: Negative for congestion, postnasal drip and sore throat.    Eyes: Negative for blurred vision and visual disturbance.   Respiratory: Negative for cough, shortness of breath and wheezing.    Cardiovascular: Negative for chest pain and leg swelling.   Gastrointestinal: Negative for abdominal pain, constipation, diarrhea, nausea and vomiting.   Endocrine: Negative for cold intolerance and heat intolerance.   Genitourinary: Negative for dysuria, frequency and hematuria.   Musculoskeletal: Negative for arthralgias, back pain and myalgias.   Skin: Positive for rash. Negative for color change.   Allergic/Immunologic: Negative for environmental allergies.   Neurological: Negative for weakness, numbness and headache.   Hematological: Does not bruise/bleed easily.   Psychiatric/Behavioral: Negative for depressed mood. The patient is not nervous/anxious.        Vitals:    03/14/19 1306   BP: 140/82   Pulse: 63   Temp: 97.1 °F (36.2 °C)   TempSrc: Temporal   SpO2: 97%   Weight: 110 kg (242 lb)   Height: 175.3 cm (69\")     Body mass index is 35.74 kg/m².    Physical Exam     Physical Exam   Constitutional: She is oriented to person, place, and time. She appears well-developed and well-nourished. No distress.   HENT:   Head: Normocephalic and atraumatic.   Right Ear: External ear normal.   Left Ear: External ear normal.   Mouth/Throat: Oropharynx is clear and moist.   Eyes: Conjunctivae and EOM are normal.   Neck: Normal range of motion. Neck supple.   Cardiovascular: Normal rate and regular rhythm.   No murmur heard.  Pulmonary/Chest: Effort normal and breath sounds normal. No respiratory distress. She has no wheezes.   Abdominal: Soft. Bowel sounds are normal. She exhibits no distension. There is no tenderness.   Musculoskeletal: Normal range of motion. She exhibits no edema.   Lymphadenopathy:    "  She has no cervical adenopathy.   Neurological: She is alert and oriented to person, place, and time. No cranial nerve deficit.   Skin: Skin is warm and dry. Rash (Erythematous vesicular rash to right trunk.) noted.   Psychiatric: She has a normal mood and affect. Her behavior is normal.       Assessment/Plan    Problem List Items Addressed This Visit        Cardiovascular and Mediastinum    Hypertension     Controlled on current medication.  Patient is to continue Tenoretic.            Digestive    GERD (gastroesophageal reflux disease)     Controlled on current medication.  Patient is to continue Prilosec.            Other    Herpes zoster without complication - Primary     We will treat the patient with Valtrex 3 times a day for 7 days.  She is also being treated with a round of steroids and gabapentin for associated pain.         Relevant Medications    valACYclovir (VALTREX) 1000 MG tablet          New Medications Ordered This Visit   Medications   • valACYclovir (VALTREX) 1000 MG tablet     Sig: Take 1 tablet by mouth 3 (Three) Times a Day.     Dispense:  21 tablet     Refill:  0   • methylPREDNISolone (MEDROL, KAY,) 4 MG tablet     Sig: Take as directed on package instructions.     Dispense:  21 each     Refill:  0   • gabapentin (NEURONTIN) 100 MG capsule     Sig: Take 1 capsule by mouth 3 (Three) Times a Day.     Dispense:  90 capsule     Refill:  0       No orders of the defined types were placed in this encounter.      Return in about 6 months (around 9/14/2019) for HTN, GERD.      Abbi Varner DO

## 2019-03-18 PROBLEM — B02.9 HERPES ZOSTER WITHOUT COMPLICATION: Status: ACTIVE | Noted: 2019-03-18

## 2019-03-18 NOTE — ASSESSMENT & PLAN NOTE
We will treat the patient with Valtrex 3 times a day for 7 days.  She is also being treated with a round of steroids and gabapentin for associated pain.

## 2019-03-29 RX ORDER — OMEPRAZOLE 20 MG/1
20 CAPSULE, DELAYED RELEASE ORAL DAILY
Qty: 90 CAPSULE | Refills: 1 | Status: SHIPPED | OUTPATIENT
Start: 2019-03-29 | End: 2019-10-21 | Stop reason: SDUPTHER

## 2019-04-15 RX ORDER — GABAPENTIN 100 MG/1
CAPSULE ORAL
Qty: 90 CAPSULE | Refills: 0 | OUTPATIENT
Start: 2019-04-15

## 2019-10-21 ENCOUNTER — OFFICE VISIT (OUTPATIENT)
Dept: INTERNAL MEDICINE | Facility: CLINIC | Age: 74
End: 2019-10-21

## 2019-10-21 VITALS
OXYGEN SATURATION: 95 % | BODY MASS INDEX: 35.52 KG/M2 | TEMPERATURE: 98.1 F | HEIGHT: 69 IN | HEART RATE: 60 BPM | SYSTOLIC BLOOD PRESSURE: 129 MMHG | WEIGHT: 239.8 LBS | DIASTOLIC BLOOD PRESSURE: 70 MMHG

## 2019-10-21 DIAGNOSIS — M06.9 RHEUMATOID ARTHRITIS, INVOLVING UNSPECIFIED SITE, UNSPECIFIED RHEUMATOID FACTOR PRESENCE: ICD-10-CM

## 2019-10-21 DIAGNOSIS — R30.0 DYSURIA: Primary | ICD-10-CM

## 2019-10-21 DIAGNOSIS — D50.9 IRON DEFICIENCY ANEMIA, UNSPECIFIED IRON DEFICIENCY ANEMIA TYPE: ICD-10-CM

## 2019-10-21 PROBLEM — B02.9 HERPES ZOSTER WITHOUT COMPLICATION: Status: RESOLVED | Noted: 2019-03-18 | Resolved: 2019-10-21

## 2019-10-21 LAB
BILIRUB BLD-MCNC: NEGATIVE MG/DL
CLARITY, POC: CLEAR
COLOR UR: YELLOW
GLUCOSE UR STRIP-MCNC: NEGATIVE MG/DL
KETONES UR QL: NEGATIVE
LEUKOCYTE EST, POC: NEGATIVE
NITRITE UR-MCNC: NEGATIVE MG/ML
PH UR: 6 [PH] (ref 5–8)
PROT UR STRIP-MCNC: NEGATIVE MG/DL
RBC # UR STRIP: NEGATIVE /UL
SP GR UR: 1.01 (ref 1–1.03)
UROBILINOGEN UR QL: NORMAL

## 2019-10-21 PROCEDURE — 99214 OFFICE O/P EST MOD 30 MIN: CPT | Performed by: INTERNAL MEDICINE

## 2019-10-21 PROCEDURE — 81003 URINALYSIS AUTO W/O SCOPE: CPT | Performed by: INTERNAL MEDICINE

## 2019-10-21 RX ORDER — FLUTICASONE PROPIONATE 50 MCG
1 SPRAY, SUSPENSION (ML) NASAL DAILY
Qty: 1 BOTTLE | Refills: 2 | Status: SHIPPED | OUTPATIENT
Start: 2019-10-21 | End: 2020-01-13

## 2019-10-21 RX ORDER — ACYCLOVIR 400 MG/1
400 TABLET ORAL
COMMUNITY
End: 2019-10-21 | Stop reason: SDUPTHER

## 2019-10-21 RX ORDER — OMEPRAZOLE 20 MG/1
20 CAPSULE, DELAYED RELEASE ORAL DAILY
Qty: 90 CAPSULE | Refills: 3 | Status: SHIPPED | OUTPATIENT
Start: 2019-10-21 | End: 2020-10-02

## 2019-10-21 RX ORDER — ATENOLOL AND CHLORTHALIDONE TABLET 50; 25 MG/1; MG/1
0.5 TABLET ORAL DAILY
Qty: 45 TABLET | Refills: 3 | Status: SHIPPED | OUTPATIENT
Start: 2019-10-21 | End: 2020-04-13

## 2019-10-21 RX ORDER — INFLUENZA VIRUS VACCINE 15; 15; 15; 15 UG/.5ML; UG/.5ML; UG/.5ML; UG/.5ML
SUSPENSION INTRAMUSCULAR
Refills: 0 | COMMUNITY
Start: 2019-10-16 | End: 2019-10-21

## 2019-10-21 RX ORDER — ACYCLOVIR 400 MG/1
400 TABLET ORAL
Qty: 50 TABLET | Refills: 3 | Status: SHIPPED | OUTPATIENT
Start: 2019-10-21

## 2019-10-21 NOTE — PROGRESS NOTES
Subjective  Marisol Muro is a 74 y.o. female    HPI this is her first visit with me to establish care here.  Has been evaluated before in this office.  She has had rheumatoid arthritis for which she is well controlled with on Plaquenil and methotrexate.  Has had a history of anemia where heme testing of his stool was negative.  She was reluctant to undergo colonoscopy she was placed on iron supplements with improvement in her hemoglobin and iron level.  Currently without new complaints.  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  Recent history of urinary tract infection for which she was treated with Cipro.  Now has recurrence of symptoms without fever or flank pain denies gross hematuria    The following portions of the patient's history were reviewed and updated as appropriate: allergies, current medications, past family history, past medical history, past social history, past surgical history, and problem list.     Review of Systems   Constitutional: Negative.  Negative for activity change, appetite change, fatigue and fever.   HENT: Negative for congestion, ear discharge, ear pain and trouble swallowing.    Eyes: Negative for photophobia and visual disturbance.   Respiratory: Negative for cough and shortness of breath.    Cardiovascular: Negative for chest pain and palpitations.   Gastrointestinal: Negative for abdominal distention, abdominal pain, constipation, diarrhea, nausea and vomiting.   Endocrine: Negative.    Genitourinary: Positive for dysuria. Negative for hematuria and urgency.   Musculoskeletal: Positive for arthralgias. Negative for back pain, joint swelling and myalgias.   Skin: Negative for color change and rash.   Allergic/Immunologic: Negative.    Neurological: Negative for dizziness, weakness, light-headedness and headaches.   Hematological: Negative for adenopathy. Does not bruise/bleed easily.   Psychiatric/Behavioral:  "Negative for agitation, confusion and dysphoric mood. The patient is not nervous/anxious.        Visit Vitals  /70   Pulse 60   Temp 98.1 °F (36.7 °C) (Temporal)   Ht 175.3 cm (69\")   Wt 109 kg (239 lb 12.8 oz)   SpO2 95%   BMI 35.41 kg/m²       Objective  Physical Exam   Constitutional: She is oriented to person, place, and time. She appears well-developed and well-nourished. No distress.   HENT:   Nose: Nose normal.   Mouth/Throat: Oropharynx is clear and moist.   Eyes: Conjunctivae and EOM are normal. No scleral icterus.   Neck: No tracheal deviation present. No thyromegaly present.   Cardiovascular: Normal rate and regular rhythm. Exam reveals no friction rub.   No murmur heard.  Pulmonary/Chest: No respiratory distress. She has no wheezes. She has no rales.   Abdominal: Soft. She exhibits no distension and no mass. There is no tenderness. There is no guarding.   Musculoskeletal: Normal range of motion. She exhibits no deformity.   Lymphadenopathy:     She has no cervical adenopathy.   Neurological: She is alert and oriented to person, place, and time. She has normal reflexes. No cranial nerve deficit. Coordination normal.   Skin: Skin is warm and dry. No rash noted. No erythema.   Psychiatric: She has a normal mood and affect. Her behavior is normal. Judgment and thought content normal.       Diagnoses and all orders for this visit:    Dysuria urine analysis without evidence of infection.  We will continue to follow and encouraged to drink enough fluids.  Hold off on antibiotic therapy  -     POC Urinalysis Dipstick, Automated    Iron deficiency anemia, unspecified iron deficiency anemia type recent lab work with normal hemoglobin and iron level.  Will DC iron supplement now continue to monitor    Rheumatoid arthritis, involving unspecified site, unspecified rheumatoid factor presence (CMS/HCC) stable with current meds follow liver panel and CBC    Other orders  -     Discontinue: FLUARIX QUADRIVALENT 0.5 " ML suspension prefilled syringe injection; ADMINISTER VACCINE PER PHYSICIAN PROTOCOL  -     acyclovir (ZOVIRAX) 400 MG tablet; Take 400 mg by mouth Every 4 (Four) Hours While Awake. Take no more than 5 doses a day.  -     acyclovir (ZOVIRAX) 400 MG tablet; Take 1 tablet by mouth Every 4 (Four) Hours While Awake. Take no more than 5 doses a day.  -     omeprazole (priLOSEC) 20 MG capsule; Take 1 capsule by mouth Daily.  -     atenolol-chlorthalidone (TENORETIC) 50-25 MG per tablet; Take 0.5 tablets by mouth Daily.

## 2019-11-01 ENCOUNTER — TELEPHONE (OUTPATIENT)
Dept: INTERNAL MEDICINE | Facility: CLINIC | Age: 74
End: 2019-11-01

## 2019-11-01 NOTE — TELEPHONE ENCOUNTER
PTS SPOUSE CALLED REGARDING DISABILITY TAG; TALKED WITH OFFICE, AND THEY ARE PRINTING OFF FORM TO PLACE IN DR BAEZ BOX; PLEASE CALL PT WHEN READY FOR ; HER SPOUSE HAS REQUESTED ONE AS WELL

## 2020-01-13 RX ORDER — FLUTICASONE PROPIONATE 50 MCG
SPRAY, SUSPENSION (ML) NASAL
Qty: 48 ML | Refills: 1 | Status: SHIPPED | OUTPATIENT
Start: 2020-01-13 | End: 2021-09-10 | Stop reason: SDUPTHER

## 2020-04-13 ENCOUNTER — TELEMEDICINE (OUTPATIENT)
Dept: INTERNAL MEDICINE | Facility: CLINIC | Age: 75
End: 2020-04-13

## 2020-04-13 DIAGNOSIS — N18.30 CKD (CHRONIC KIDNEY DISEASE), STAGE III (HCC): ICD-10-CM

## 2020-04-13 DIAGNOSIS — K21.9 GASTROESOPHAGEAL REFLUX DISEASE, ESOPHAGITIS PRESENCE NOT SPECIFIED: ICD-10-CM

## 2020-04-13 DIAGNOSIS — M06.9 RHEUMATOID ARTHRITIS, INVOLVING UNSPECIFIED SITE, UNSPECIFIED RHEUMATOID FACTOR PRESENCE: ICD-10-CM

## 2020-04-13 DIAGNOSIS — I10 HYPERTENSION, UNSPECIFIED TYPE: Primary | ICD-10-CM

## 2020-04-13 PROCEDURE — 99214 OFFICE O/P EST MOD 30 MIN: CPT | Performed by: INTERNAL MEDICINE

## 2020-04-13 RX ORDER — HYDROCHLOROTHIAZIDE 12.5 MG/1
12.5 TABLET ORAL DAILY
Qty: 90 TABLET | Refills: 3 | Status: SHIPPED | OUTPATIENT
Start: 2020-04-13 | End: 2021-01-14 | Stop reason: SDUPTHER

## 2020-04-13 NOTE — PROGRESS NOTES
Subjective  Marisol Muro is a 75 y.o. female    HPI video visit follow-up requested by patient with history of rheumatoid arthritis for which she is on Plaquenil and methotrexate.  She has hypertension and CKD.  No new complaints reasonably compliant with diet and exercise.  Denies alcohol or tobacco use    The following portions of the patient's history were reviewed and updated as appropriate: allergies, current medications, past family history, past medical history, past social history, past surgical history, and problem list.     Review of Systems   Constitutional: Positive for fatigue. Negative for activity change, appetite change and fever.   HENT: Negative for congestion, ear discharge, ear pain and trouble swallowing.    Eyes: Negative for photophobia and visual disturbance.   Respiratory: Negative for cough and shortness of breath.    Cardiovascular: Negative for chest pain and palpitations.   Gastrointestinal: Negative for abdominal distention, abdominal pain, constipation, diarrhea, nausea and vomiting.   Endocrine: Negative.    Genitourinary: Negative for dysuria, hematuria and urgency.   Musculoskeletal: Positive for arthralgias and gait problem. Negative for back pain, joint swelling and myalgias.   Skin: Negative for color change and rash.   Allergic/Immunologic: Negative.    Neurological: Negative for dizziness, weakness, light-headedness and headaches.   Hematological: Negative for adenopathy. Does not bruise/bleed easily.   Psychiatric/Behavioral: Positive for sleep disturbance. Negative for agitation, confusion and dysphoric mood. The patient is not nervous/anxious.        There were no vitals taken for this visit.    Objective  Physical Exam   Constitutional: She is oriented to person, place, and time. She appears well-developed and well-nourished.   HENT:   Head: Normocephalic.   Pulmonary/Chest: Effort normal.   Neurological: She is alert and oriented to person, place, and time.    Psychiatric: She has a normal mood and affect. Her behavior is normal. Judgment and thought content normal.   Vitals reviewed.      Diagnoses and all orders for this visit:    Hypertension, unspecified type stable readings at home show good control continue with low-salt diet and exercise program  Continue with reflux precautions and proton pump inhibitors  Gastroesophageal reflux disease, esophagitis presence not specified    CKD (chronic kidney disease), stage III (CMS/HCC) stable check lab work with next visit    Rheumatoid arthritis, involving unspecified site, unspecified rheumatoid factor presence (CMS/[facility]) stable following up with rheumatologist will need routine follow-up labs

## 2020-04-27 ENCOUNTER — TELEPHONE (OUTPATIENT)
Dept: INTERNAL MEDICINE | Facility: CLINIC | Age: 75
End: 2020-04-27

## 2020-04-27 RX ORDER — CETIRIZINE HYDROCHLORIDE 10 MG/1
10 TABLET ORAL DAILY
Qty: 30 TABLET | Refills: 1 | Status: SHIPPED | OUTPATIENT
Start: 2020-04-27 | End: 2020-08-18 | Stop reason: SDUPTHER

## 2020-08-18 RX ORDER — CETIRIZINE HYDROCHLORIDE 10 MG/1
10 TABLET ORAL DAILY
Qty: 30 TABLET | Refills: 11 | Status: SHIPPED | OUTPATIENT
Start: 2020-08-18 | End: 2021-09-10 | Stop reason: SDUPTHER

## 2020-09-16 ENCOUNTER — OFFICE VISIT (OUTPATIENT)
Dept: ORTHOPEDIC SURGERY | Facility: CLINIC | Age: 75
End: 2020-09-16

## 2020-09-16 VITALS — HEART RATE: 71 BPM | HEIGHT: 69 IN | WEIGHT: 236.8 LBS | BODY MASS INDEX: 35.07 KG/M2 | OXYGEN SATURATION: 97 %

## 2020-09-16 DIAGNOSIS — M79.672 LEFT FOOT PAIN: Primary | ICD-10-CM

## 2020-09-16 PROCEDURE — 99203 OFFICE O/P NEW LOW 30 MIN: CPT | Performed by: ORTHOPAEDIC SURGERY

## 2020-09-16 NOTE — PROGRESS NOTES
"NEW PATIENT    Patient: Marisol Muro  : 1945    Primary Care Provider: Mati Samson MD    Requesting Provider: As above    Pain of the Left Foot      History    Chief Complaint: Left foot pain    History of Present Illness: This is a very pleasant 75-year-old woman who I have seen in the past.  She has longstanding rheumatoid arthritis.  Dr. Walt Diamond is her rheumatologist.  She has diabetes that is well controlled.  She has custom orthotics.  She last had them made about a year ago.  She is here with a new problem in the left foot and some pain in the right foot.  The right foot pain is over the tarsometatarsal joints, where she has severe degenerative changes.  The left foot pain is under the first metatarsal head.  She has developed a large painful callus.  She reports she \"picked at it\" and that helped a little bit.  It hurts to walk on.  She has not had any open diabetic ulcers.  She relates the pain is 9 out of 10, worse barefoot    Current Outpatient Medications on File Prior to Visit   Medication Sig Dispense Refill   • acetaminophen (TYLENOL) 650 MG 8 hr tablet Take 650 mg by mouth 2 (Two) Times a Day.     • acyclovir (ZOVIRAX) 400 MG tablet Take 1 tablet by mouth Every 4 (Four) Hours While Awake. Take no more than 5 doses a day. 50 tablet 3   • cetirizine (zyrTEC) 10 MG tablet Take 1 tablet by mouth Daily. 30 tablet 11   • fluticasone (FLONASE) 50 MCG/ACT nasal spray INSTILL 2 SPRAYS IN EACH NOSTRIL DAILY 48 mL 1   • folic acid (FOLVITE) 1 MG tablet Take 1 mg by mouth Daily.  0   • hydroCHLOROthiazide (HYDRODIURIL) 12.5 MG tablet Take 1 tablet by mouth Daily. 90 tablet 3   • hydroxychloroquine (PLAQUENIL) 200 MG tablet 200 mg 2 (Two) Times a Day.  1   • methotrexate 2.5 MG tablet Take 15 mg by mouth 1 (One) Time Per Week.  1   • omeprazole (priLOSEC) 20 MG capsule Take 1 capsule by mouth Daily. 90 capsule 3     No current facility-administered medications on file prior to visit.     "   Allergies   Allergen Reactions   • Tramadol Nausea Only      Past Medical History:   Diagnosis Date   • Asymptomatic bacteriuria    • Esophageal stricture    • GERD (gastroesophageal reflux disease)    • HL (hearing loss)    • Hypertension    • Impaired fasting glucose    • Obesity    • Osteoarthritis    • Rheumatoid arthritis (CMS/HCC)      Past Surgical History:   Procedure Laterality Date   • COLONOSCOPY     • JOINT REPLACEMENT     • REPLACEMENT TOTAL KNEE BILATERAL      ,    • TOTAL HIP ARTHROPLASTY  2014    right     Family History   Problem Relation Age of Onset   • Stroke Mother    • Arthritis Mother    • Heart disease Mother    • Lung cancer Father    • Cancer Father    • Hyperlipidemia Sister       Social History     Socioeconomic History   • Marital status:      Spouse name: [facility] [place]   • Number of children: 2   • Years of education: Not on file   • Highest education level: Not on file   Occupational History   • Occupation: Retired     Comment:    Tobacco Use   • Smoking status: Former Smoker     Packs/day: 1.00     Years: 20.00     Pack years: 20.00     Quit date: 1983     Years since quittin.7   • Smokeless tobacco: Former User     Quit date: 1985   Substance and Sexual Activity   • Alcohol use: Yes     Comment: rare    • Drug use: No   • Sexual activity: Not Currently     Partners: Male   Social History Narrative    :     to [employer] [place] 45yrs    2 kids:    Yamil Muro    5 gk    Hobbies: grandkids    Exercise: none        Review of Systems   Constitutional: Negative.    HENT: Negative.    Eyes: Negative.    Respiratory: Negative.    Cardiovascular: Negative.    Gastrointestinal: Negative.    Endocrine: Negative.    Genitourinary: Negative.    Musculoskeletal: Positive for arthralgias and joint swelling.   Skin: Negative.    Allergic/Immunologic: Negative.    Neurological: Negative.    Hematological: Negative.    Psychiatric/Behavioral: Negative.   "      The following portions of the patient's history were reviewed and updated as appropriate: allergies, current medications, past family history, past medical history, past social history, past surgical history and problem list.    Physical Exam:   Pulse 71   Ht 175.3 cm (69.02\")   Wt 107 kg (236 lb 12.8 oz)   SpO2 97%   BMI 34.95 kg/m²   GENERAL: Body habitus: obese    Lower extremity edema: Right: 1+ pitting; Left: 1+ pitting    Varicose veins:  Right: moderate; Left: moderate    Gait: antalgic     Mental Status:  awake and alert; oriented to person, place, and time    Voice:  clear  SKIN:  Lower extremity: venous stasis pigment    Hair Growth(lower extremity):  Right:normal; Left:  normal  NAILS: Toenails: normal  HEENT: Head: Normocephalic, atraumatic,  without obvious abnormality.  eye: normal external eye, no icterus  ears:normal external ears  PULM:  Repiratory effort normal  CV:  Dorsalis Pedis:  Right: 2+; Left:2+    Posterior Tibial: Right:2+; Left:2+    Capillary Refill:  Brisk  MSK:  Hand:rheumatoid deformities      Tibia:  Right:  non tender; Left:  non tender      Ankle:  Right: non tender; Left:  non tender      Foot:  Right:  Tender over the tarsometatarsal joints; Left:  Very large thick callus under the first metatarsal head, 1 cm in diameter, about 6 mm thick, tender to palpation      NEURO:     Lower extremity sensation: intact              Medical Decision Making    Data Review:   ordered and reviewed x-rays today    Assessment and Plan/ Diagnosis/Treatment options:   1. Left foot pain  She has developed a large painful callus under the first metatarsal head.  I explained that our feet develop different pressure points over time.  She was wondering if a bone were \"sticking out\" and I explained that it is not.  She does have severe midfoot deformity, and I am sure that the metatarsals have shifted a little bit over time, but it is not 1 specific bone protruding.  I explained calluses are " due to pressure, the best way to treat them is to decrease the pressure, and she needs orthotic adjustment and secondly to work on the callus every day.  I explained the calluses not like a tumor, it cannot be cut out.  I explained that if she suddenly became paraplegic, the callus would go away because she would not be walking on it.  I put a dancers pad on her custom orthotic to decrease pressure on the area, if she finds it comfortable she should go back to the orthotist and have them attach it permanently.  I also remove the callus for her today.  I showed her how very thick it was.  It did not cause a diabetic ulcer, but it was a pre-ulcerous lesion.  She needs to work on his every day with a pumice stone or a ped egg.  We went over everything in detail.  I will be happy to see her anytime.    Also she definitely needs to wear compression socks every day, she has fairly significant venous stasis.  She reports she does wear them most of the time  - XR Foot 2 View Left            Radiology Ordered []  Radiology Reports Reviewed []      Radiology Images Reviewed []   Labs Reviewed []    Labs Ordered []   PCP Records Reviewed []    Provider Records Reviewed []    ER Records Reviewed []    Hospital Records Reviewed []    History Obtained From Family []    Phone conversation with Provider []    Records Requested []

## 2020-09-16 NOTE — PATIENT INSTRUCTIONS
"Education    Diabetic Foot Care / Neuropathic Foot Care    SKIN  · Remove shoes and socks and look at the feet every morning and night.         · Blisters - clean it with peroxide or alcohol.  DO NOT put a shoe back on until it is healed.  DO NOT WALK ON THE FOOT.  If it is red or looks infected, call your doctor.    · Callus - sand calluses daily, it works better on dry skin.  A PED EGG is the best tool, or file or a pumice stone ([facility] using a back and forth motion over the callus.    · Ingrown nail - soak it in soapy water and call your doctor.    · Ulcers or sores on the foot - DO NOT PUT A SHOE ON, DO NOT WALK ON THE FOOT, go to the doctor who looks at your feet.    · Moisturize the feet every night.  An easy method: The foot with a thin layer of Vaseline or Bag Lovely (you can find this at [employer], any feed store) and then a sock    NAILS  · Trim or file your nails straight across and leave them a little long.  If you have thick fungal nails, a nail  tool or dremel tool works well.    · If you have an ingrown nail with reddened skin, soak it (as mentioned above and call your doctor).    SHOES  · Keep your shoes in good repair and wear any special inserts or diabetic shoes as prescribed.    · If you have a problem with a special diabetic shoe and/or insert, such as rubbing, go back to where you got the diabetic shoe/insert as soon as possible.    · If you are in \"regular\" shoes, make sure they fit.  Shoes with soft, padded soles, rounded toes, and good support are best.    THINGS NOT TO DO  · DO NOT go barefoot    · DO NOT soak feet in very hot water.    · DO NOT soak feet in anything other than water with soap or Epsom Salts (NO bleach, turpentine, gasoline, etc.).          "

## 2020-10-02 RX ORDER — OMEPRAZOLE 20 MG/1
CAPSULE, DELAYED RELEASE ORAL
Qty: 90 CAPSULE | Refills: 3 | Status: SHIPPED | OUTPATIENT
Start: 2020-10-02 | End: 2021-09-23

## 2020-12-16 ENCOUNTER — E-VISIT (OUTPATIENT)
Dept: INTERNAL MEDICINE | Facility: CLINIC | Age: 75
End: 2020-12-16

## 2020-12-17 ENCOUNTER — OFFICE VISIT (OUTPATIENT)
Dept: INTERNAL MEDICINE | Facility: CLINIC | Age: 75
End: 2020-12-17

## 2020-12-17 ENCOUNTER — TELEPHONE (OUTPATIENT)
Dept: INTERNAL MEDICINE | Facility: CLINIC | Age: 75
End: 2020-12-17

## 2020-12-17 DIAGNOSIS — R30.0 DYSURIA: Primary | ICD-10-CM

## 2020-12-17 PROCEDURE — 99442 PR PHYS/QHP TELEPHONE EVALUATION 11-20 MIN: CPT | Performed by: INTERNAL MEDICINE

## 2020-12-17 RX ORDER — NITROFURANTOIN 25; 75 MG/1; MG/1
100 CAPSULE ORAL 2 TIMES DAILY
Qty: 14 CAPSULE | Refills: 0 | Status: SHIPPED | OUTPATIENT
Start: 2020-12-17 | End: 2021-01-14

## 2020-12-17 NOTE — PROGRESS NOTES
Subjective  Marisol Muro is a 75 y.o. female  You have chosen to receive care through a telephone visit. Do you consent to use a telephone visit for your medical care today? Yes  HPI 3-day history of dysuria and urinary frequency without fever or chills has had a history of UTI in the past denies blood in her urine is no history of nephrolithiasis    The following portions of the patient's history were reviewed and updated as appropriate: allergies, current medications, past family history, past medical history, past social history, past surgical history, and problem list.     Review of Systems   Constitutional: Negative.  Negative for activity change, appetite change, fatigue and fever.   HENT: Negative for congestion, ear discharge, ear pain and trouble swallowing.    Eyes: Negative for photophobia and visual disturbance.   Respiratory: Negative for cough and shortness of breath.    Cardiovascular: Negative for chest pain and palpitations.   Gastrointestinal: Negative for abdominal distention, abdominal pain, constipation, diarrhea, nausea and vomiting.   Endocrine: Negative.    Genitourinary: Positive for difficulty urinating and dysuria. Negative for hematuria and urgency.   Musculoskeletal: Positive for arthralgias. Negative for back pain, joint swelling and myalgias.   Skin: Negative for color change and rash.   Allergic/Immunologic: Negative.    Neurological: Negative for dizziness, weakness, light-headedness and headaches.   Hematological: Negative for adenopathy. Does not bruise/bleed easily.   Psychiatric/Behavioral: Negative for agitation, confusion and dysphoric mood. The patient is not nervous/anxious.        There were no vitals taken for this visit.    Objective    Diagnoses and all orders for this visit:    Dysuria suspect cystitis we will treat her empirically with Macrobid encouraged to drink enough fluids    Total time for visit 12 minutes  Other orders  -     nitrofurantoin,  macrocrystal-monohydrate, (Macrobid) 100 MG capsule; Take 1 capsule by mouth 2 (Two) Times a Day.

## 2021-01-14 ENCOUNTER — OFFICE VISIT (OUTPATIENT)
Dept: INTERNAL MEDICINE | Facility: CLINIC | Age: 76
End: 2021-01-14

## 2021-01-14 VITALS
HEART RATE: 69 BPM | HEIGHT: 69 IN | BODY MASS INDEX: 34.66 KG/M2 | DIASTOLIC BLOOD PRESSURE: 60 MMHG | SYSTOLIC BLOOD PRESSURE: 115 MMHG | WEIGHT: 234 LBS

## 2021-01-14 DIAGNOSIS — E66.9 OBESITY (BMI 30-39.9): ICD-10-CM

## 2021-01-14 DIAGNOSIS — M06.9 RHEUMATOID ARTHRITIS, INVOLVING UNSPECIFIED SITE, UNSPECIFIED WHETHER RHEUMATOID FACTOR PRESENT (HCC): Primary | ICD-10-CM

## 2021-01-14 DIAGNOSIS — I10 ESSENTIAL HYPERTENSION: ICD-10-CM

## 2021-01-14 PROCEDURE — G0439 PPPS, SUBSEQ VISIT: HCPCS | Performed by: INTERNAL MEDICINE

## 2021-01-14 RX ORDER — HYDROCHLOROTHIAZIDE 12.5 MG/1
12.5 TABLET ORAL DAILY
Qty: 90 TABLET | Refills: 3 | Status: SHIPPED | OUTPATIENT
Start: 2021-01-14 | End: 2021-09-10 | Stop reason: SDUPTHER

## 2021-01-15 NOTE — PROGRESS NOTES
The ABCs of the Annual Wellness Visit  Subsequent Medicare Wellness Visit    Chief Complaint   Patient presents with   • Medicare Wellness-subsequent       Subjective   History of Present Illness:  Marisol Muro is a 75 y.o. female who presents for a Subsequent Medicare Wellness Visit.    HEALTH RISK ASSESSMENT    Recent Hospitalizations:  No hospitalization(s) within the last year.    Current Medical Providers:  Patient Care Team:  Mati Samson MD as PCP - General (Internal Medicine)  Walt Diamond MD as Consulting Physician (Rheumatology)    Smoking Status:  Social History     Tobacco Use   Smoking Status Former Smoker   • Packs/day: 1.00   • Years: 20.00   • Pack years: 20.00   • Quit date: 1983   • Years since quittin.0   Smokeless Tobacco Former User   • Quit date: 1985       Alcohol Consumption:  Social History     Substance and Sexual Activity   Alcohol Use Yes    Comment: rare        Depression Screen:   PHQ-2/PHQ-9 Depression Screening 2021   Little interest or pleasure in doing things 0   Feeling down, depressed, or hopeless 0   Total Score 0       Fall Risk Screen:  ROSMERY Fall Risk Assessment was completed, and patient is at MODERATE risk for falls. Assessment completed on:2021    Health Habits and Functional and Cognitive Screening:  Functional & Cognitive Status 2021   Do you have difficulty preparing food and eating? No   Do you have difficulty bathing yourself, getting dressed or grooming yourself? No   Do you have difficulty using the toilet? No   Do you have difficulty moving around from place to place? No   Do you have trouble with steps or getting out of a bed or a chair? No   Current Diet Well Balanced Diet   Dental Exam Up to date   Exercise (times per week) 0 times per week   Current Exercise Activities Include No Regular Exercise   Do you need help using the phone?  No   Are you deaf or do you have serious difficulty hearing?  No   Do you need help  with transportation? No   Do you need help shopping? No   Do you need help preparing meals?  No   Do you need help with housework?  No   Do you need help with laundry? No   Do you need help taking your medications? No   Do you need help managing money? No   Do you ever drive or ride in a car without wearing a seat belt? No   Have you felt unusual stress, anger or loneliness in the last month? No   Who do you live with? Spouse   If you need help, do you have trouble finding someone available to you? No   Have you been bothered in the last four weeks by sexual problems? No   Do you have difficulty concentrating, remembering or making decisions? No         Does the patient have evidence of cognitive impairment? No    Asprin use counseling:Does not need ASA (and currently is not on it)    Age-appropriate Screening Schedule:  Refer to the list below for future screening recommendations based on patient's age, sex and/or medical conditions. Orders for these recommended tests are listed in the plan section. The patient has been provided with a written plan.    Health Maintenance   Topic Date Due   • TDAP/TD VACCINES (1 - Tdap) 01/25/1962   • COLONOSCOPY  04/21/2013   • ZOSTER VACCINE (2 of 3) 10/27/2014   • MAMMOGRAM  07/31/2017   • INFLUENZA VACCINE  Completed          The following portions of the patient's history were reviewed and updated as appropriate: allergies, current medications, past family history, past medical history, past social history, past surgical history and problem list.    Outpatient Medications Prior to Visit   Medication Sig Dispense Refill   • acetaminophen (TYLENOL) 650 MG 8 hr tablet Take 650 mg by mouth 2 (Two) Times a Day.     • acyclovir (ZOVIRAX) 400 MG tablet Take 1 tablet by mouth Every 4 (Four) Hours While Awake. Take no more than 5 doses a day. 50 tablet 3   • cetirizine (zyrTEC) 10 MG tablet Take 1 tablet by mouth Daily. 30 tablet 11   • Diclofenac Sodium (VOLTAREN) 1 % gel gel APPLY  (2G) BY TOPICAL ROUTE 2 TIMES EVERY DAY TO THE AFFECTED AREA(S)     • fluticasone (FLONASE) 50 MCG/ACT nasal spray INSTILL 2 SPRAYS IN EACH NOSTRIL DAILY 48 mL 1   • folic acid (FOLVITE) 1 MG tablet Take 1 mg by mouth Daily.  0   • hydroxychloroquine (PLAQUENIL) 200 MG tablet 200 mg 2 (Two) Times a Day.  1   • methotrexate 2.5 MG tablet Take 15 mg by mouth 1 (One) Time Per Week.  1   • omeprazole (priLOSEC) 20 MG capsule TAKE 1 CAPSULE BY MOUTH EVERY DAY 90 capsule 3   • hydroCHLOROthiazide (HYDRODIURIL) 12.5 MG tablet Take 1 tablet by mouth Daily. 90 tablet 3   • nitrofurantoin, macrocrystal-monohydrate, (Macrobid) 100 MG capsule Take 1 capsule by mouth 2 (Two) Times a Day. 14 capsule 0     No facility-administered medications prior to visit.        Patient Active Problem List   Diagnosis   • Rheumatoid arthritis (CMS/HCC)   • Osteoarthritis   • Hypertension   • GERD (gastroesophageal reflux disease)   • Esophageal stricture   • Obesity (BMI 30-39.9)   • CKD (chronic kidney disease), stage III   • Iron deficiency anemia       Advanced Care Planning:  ACP discussion was held with the patient during this visit. Patient has an advance directive (not in EMR), copy requested.    Review of Systems   Constitutional: Negative.  Negative for activity change, appetite change, fatigue and fever.   HENT: Negative for congestion, ear discharge, ear pain and trouble swallowing.    Eyes: Negative for photophobia and visual disturbance.   Respiratory: Negative for cough and shortness of breath.    Cardiovascular: Negative for chest pain and palpitations.   Gastrointestinal: Negative for abdominal distention, abdominal pain, constipation, diarrhea, nausea and vomiting.   Endocrine: Negative.    Genitourinary: Negative for dysuria, hematuria and urgency.   Musculoskeletal: Positive for arthralgias. Negative for back pain, joint swelling and myalgias.   Skin: Negative for color change and rash.   Allergic/Immunologic: Negative.   "  Neurological: Negative for dizziness, weakness, light-headedness and headaches.   Hematological: Negative for adenopathy. Does not bruise/bleed easily.   Psychiatric/Behavioral: Positive for sleep disturbance. Negative for agitation, confusion and dysphoric mood. The patient is not nervous/anxious.        Compared to one year ago, the patient feels her physical health is the same.  Compared to one year ago, the patient feels her mental health is the same.    Reviewed chart for potential of high risk medication in the elderly: yes  Reviewed chart for potential of harmful drug interactions in the elderly:yes    Objective         Vitals:    01/14/21 1411   BP: 115/60  Comment: patient reports   Pulse: 69  Comment: patient reports   Weight: 106 kg (234 lb)   Height: 175.3 cm (69.02\")   PainSc: 0-No pain       Body mass index is 34.54 kg/m².  Discussed the patient's BMI with her. The BMI is above average; BMI management plan is completed.    Physical Exam          Assessment/Plan   Medicare Risks and Personalized Health Plan  CMS Preventative Services Quick Reference  Advance Directive Discussion  Obesity/Overweight     The above risks/problems have been discussed with the patient.  Pertinent information has been shared with the patient in the After Visit Summary.  Follow up plans and orders are seen below in the Assessment/Plan Section.    Diagnoses and all orders for this visit:    1. Rheumatoid arthritis, involving unspecified site, unspecified whether rheumatoid factor present (CMS/[facility]) (Primary) stable on methotrexate and Plaquenil follow lab work she is following this up with her rheumatologist    2. Obesity (BMI 30-39.9) continue the dietary restrictions and weight loss    3. Essential hypertension stable with current meds and low-salt diet she was recently switched over from chlorthalidone to HCTZ    Other orders  -     hydroCHLOROthiazide (HYDRODIURIL) 12.5 MG tablet; Take 1 tablet by mouth Daily.  Dispense: " 90 tablet; Refill: 3      Follow Up:  Return in about 6 months (around 7/14/2021).     An After Visit Summary and PPPS were given to the patient.

## 2021-05-11 NOTE — TELEPHONE ENCOUNTER
I spoke with Marisol - we did a telephone visit with her today   
Patient called and stated that she would like a call back from a clinical staff member regarding an appointment. The patient states she submitted a form for an e-visit through [employer] and has not heard anything back yet. The patient states that she would like a call back as soon as possible to discuss this. No other information was provided at this time. Please advise.     Patient call back [phone]  
[phone]

## 2021-07-02 ENCOUNTER — OFFICE VISIT (OUTPATIENT)
Dept: INTERNAL MEDICINE | Facility: CLINIC | Age: 76
End: 2021-07-02

## 2021-07-02 VITALS
SYSTOLIC BLOOD PRESSURE: 140 MMHG | TEMPERATURE: 97.1 F | BODY MASS INDEX: 34.13 KG/M2 | OXYGEN SATURATION: 96 % | HEART RATE: 76 BPM | DIASTOLIC BLOOD PRESSURE: 78 MMHG | WEIGHT: 230.4 LBS | HEIGHT: 69 IN

## 2021-07-02 DIAGNOSIS — R73.9 HYPERGLYCEMIA: ICD-10-CM

## 2021-07-02 DIAGNOSIS — I10 ESSENTIAL HYPERTENSION: ICD-10-CM

## 2021-07-02 DIAGNOSIS — R30.0 DYSURIA: Primary | ICD-10-CM

## 2021-07-02 PROBLEM — N18.30 CKD (CHRONIC KIDNEY DISEASE), STAGE III (HCC): Status: RESOLVED | Noted: 2018-04-18 | Resolved: 2021-07-02

## 2021-07-02 LAB — HBA1C MFR BLD: 5.5 %

## 2021-07-02 PROCEDURE — 99214 OFFICE O/P EST MOD 30 MIN: CPT | Performed by: INTERNAL MEDICINE

## 2021-07-02 PROCEDURE — 83036 HEMOGLOBIN GLYCOSYLATED A1C: CPT | Performed by: INTERNAL MEDICINE

## 2021-07-02 RX ORDER — NITROFURANTOIN 25; 75 MG/1; MG/1
100 CAPSULE ORAL 2 TIMES DAILY
Qty: 10 CAPSULE | Refills: 0 | Status: SHIPPED | OUTPATIENT
Start: 2021-07-02 | End: 2021-09-06

## 2021-07-02 NOTE — PROGRESS NOTES
"Answers for HPI/ROS submitted by the patient on 6/27/2021  What is the primary reason for your visit?: Abdominal Pain    Subjective  Marisol Muro is a 76 y.o. female    HPI coming in for evaluation of her right-sided hip and flank pain which started about 3 days ago denies direct trauma.  History of dysuria but no gross hematuria.  She took Azo-Standard for this a few weeks ago.  Lab work with a history of hyperglycemia she has hypertension she is following up with her rheumatologist.  She is not taking any OTC medication for her hip pain    The following portions of the patient's history were reviewed and updated as appropriate: allergies, current medications, past family history, past medical history, past social history, past surgical history, and problem list.     Review of Systems   Constitutional: Negative.  Negative for activity change, appetite change, fatigue and fever.   HENT: Negative for congestion, ear discharge, ear pain and trouble swallowing.    Eyes: Negative for photophobia and visual disturbance.   Respiratory: Negative for cough and shortness of breath.    Cardiovascular: Negative for chest pain and palpitations.   Gastrointestinal: Positive for abdominal pain, constipation and diarrhea. Negative for abdominal distention, nausea and vomiting.   Endocrine: Negative.    Genitourinary: Positive for frequency. Negative for dysuria, hematuria and urgency.   Musculoskeletal: Positive for myalgias. Negative for arthralgias, back pain and joint swelling.   Skin: Negative for color change and rash.   Allergic/Immunologic: Negative.    Neurological: Negative for dizziness, weakness, light-headedness and headaches.   Hematological: Negative for adenopathy. Does not bruise/bleed easily.   Psychiatric/Behavioral: Negative for agitation, confusion and dysphoric mood. The patient is not nervous/anxious.        Visit Vitals  /78   Pulse 76   Temp 97.1 °F (36.2 °C) (Infrared)   Ht 175.3 cm (69.02\")   Wt " 105 kg (230 lb 6.4 oz)   SpO2 96%   BMI 34.00 kg/m²       Objective  Physical Exam  Constitutional:       General: She is not in acute distress.     Appearance: She is well-developed.   HENT:      Nose: Nose normal.   Eyes:      General: No scleral icterus.     Conjunctiva/sclera: Conjunctivae normal.   Neck:      Thyroid: No thyromegaly.      Trachea: No tracheal deviation.   Cardiovascular:      Rate and Rhythm: Normal rate and regular rhythm.      Heart sounds: No murmur heard.   No friction rub.   Pulmonary:      Effort: No respiratory distress.      Breath sounds: No wheezing or rales.   Abdominal:      General: There is no distension.      Palpations: Abdomen is soft. There is no mass.      Tenderness: There is no abdominal tenderness. There is no guarding.   Musculoskeletal:         General: No deformity. Normal range of motion.   Lymphadenopathy:      Cervical: No cervical adenopathy.   Skin:     General: Skin is warm and dry.      Findings: No erythema or rash.   Neurological:      Mental Status: She is alert and oriented to person, place, and time.      Cranial Nerves: No cranial nerve deficit.      Coordination: Coordination normal.      Deep Tendon Reflexes: Reflexes are normal and symmetric.   Psychiatric:         Behavior: Behavior normal.         Thought Content: Thought content normal.         Judgment: Judgment normal.         Diagnoses and all orders for this visit:    Dysuria was not able to give us a urine specimen.  Empiric antibiotic therapy with Macrobid  Hyperglycemia (shows good control continue with dietary restrictions    Essential hypertension stable with current meds and low-salt diet    Has musculoskeletal hip pain.  Continue to observe for now Tylenol as needed imaging studies if not much better

## 2021-07-19 ENCOUNTER — PATIENT MESSAGE (OUTPATIENT)
Dept: INTERNAL MEDICINE | Facility: CLINIC | Age: 76
End: 2021-07-19

## 2021-07-22 ENCOUNTER — OFFICE VISIT (OUTPATIENT)
Dept: INTERNAL MEDICINE | Facility: CLINIC | Age: 76
End: 2021-07-22

## 2021-07-22 VITALS
DIASTOLIC BLOOD PRESSURE: 70 MMHG | SYSTOLIC BLOOD PRESSURE: 138 MMHG | HEART RATE: 75 BPM | BODY MASS INDEX: 33.53 KG/M2 | OXYGEN SATURATION: 98 % | WEIGHT: 226.4 LBS | HEIGHT: 69 IN | TEMPERATURE: 97.7 F

## 2021-07-22 DIAGNOSIS — R35.0 URINE FREQUENCY: Primary | ICD-10-CM

## 2021-07-22 DIAGNOSIS — R19.03 RIGHT LOWER QUADRANT ABDOMINAL MASS: ICD-10-CM

## 2021-07-22 DIAGNOSIS — N81.10 FEMALE BLADDER PROLAPSE: ICD-10-CM

## 2021-07-22 LAB
BILIRUB BLD-MCNC: NEGATIVE MG/DL
CLARITY, POC: CLEAR
COLOR UR: YELLOW
GLUCOSE UR STRIP-MCNC: NEGATIVE MG/DL
KETONES UR QL: NEGATIVE
LEUKOCYTE EST, POC: ABNORMAL
NITRITE UR-MCNC: NEGATIVE MG/ML
PH UR: 6 [PH] (ref 5–8)
PROT UR STRIP-MCNC: ABNORMAL MG/DL
RBC # UR STRIP: NEGATIVE /UL
SP GR UR: 1.02 (ref 1–1.03)
UROBILINOGEN UR QL: NORMAL

## 2021-07-22 PROCEDURE — 81003 URINALYSIS AUTO W/O SCOPE: CPT | Performed by: INTERNAL MEDICINE

## 2021-07-22 PROCEDURE — 99214 OFFICE O/P EST MOD 30 MIN: CPT | Performed by: INTERNAL MEDICINE

## 2021-07-22 NOTE — PROGRESS NOTES
Answers for HPI/ROS submitted by the patient on 7/21/2021  What is the primary reason for your visit?: Abdominal Pain  Chronicity: new  Onset: 1 to 4 weeks ago  Onset quality: gradual  Frequency: 2 to 4 times per day  Episode duration: 3 days  Progression since onset: gradually worsening  Pain location: suprapubic region  Pain - numeric: 5/10  Pain quality: a sensation of fullness  Radiates to: perineum  anorexia: Yes  arthralgias: Yes  belching: No  constipation: No  diarrhea: No  dysuria: Yes  fever: No  flatus: No  frequency: Yes  headaches: No  hematochezia: No  hematuria: No  melena: No  myalgias: No  nausea: No  weight loss: No  vomiting: No  Aggravated by: bowel movement  Relieved by: being still    Subjective  Marisol Muro is a 76 y.o. female    HPI having in for evaluation of lower abdominal discomfort has noticed possible vaginal prolapse when she strains herself.  No nausea vomiting or diarrhea.  Has intermittent constipation denies weight loss    The following portions of the patient's history were reviewed and updated as appropriate: allergies, current medications, past family history, past medical history, past social history, past surgical history, and problem list.     Review of Systems   Constitutional: Negative.  Negative for activity change, appetite change, fatigue and fever.   HENT: Negative for congestion, ear discharge, ear pain and trouble swallowing.    Eyes: Negative for photophobia and visual disturbance.   Respiratory: Negative for cough and shortness of breath.    Cardiovascular: Negative for chest pain and palpitations.   Gastrointestinal: Positive for abdominal pain. Negative for abdominal distention, constipation, diarrhea, nausea and vomiting.   Endocrine: Negative.    Genitourinary: Positive for dysuria and frequency. Negative for hematuria and urgency.   Musculoskeletal: Positive for arthralgias. Negative for back pain, joint swelling and myalgias.   Skin: Negative for color  "change and rash.   Allergic/Immunologic: Negative.    Neurological: Negative for dizziness, weakness, light-headedness and headaches.   Hematological: Negative for adenopathy. Does not bruise/bleed easily.   Psychiatric/Behavioral: Negative for agitation, confusion and dysphoric mood. The patient is not nervous/anxious.        Visit Vitals  /70   Pulse 75   Temp 97.7 °F (36.5 °C) (Infrared)   Ht 175.3 cm (69.02\")   Wt 103 kg (226 lb 6.4 oz)   SpO2 98%   BMI 33.41 kg/m²       Objective  Physical Exam  Constitutional:       General: She is not in acute distress.     Appearance: She is well-developed.   HENT:      Nose: Nose normal.   Eyes:      General: No scleral icterus.     Conjunctiva/sclera: Conjunctivae normal.   Neck:      Thyroid: No thyromegaly.      Trachea: No tracheal deviation.   Cardiovascular:      Rate and Rhythm: Normal rate and regular rhythm.      Heart sounds: No murmur heard.   No friction rub.   Pulmonary:      Effort: No respiratory distress.      Breath sounds: No wheezing or rales.   Abdominal:      General: There is no distension.      Palpations: Abdomen is soft. There is mass.      Tenderness: There is no abdominal tenderness. There is no guarding.          Comments: Firm mass   Genitourinary:         Comments: prolapse  Musculoskeletal:         General: Deformity present. Normal range of motion.   Lymphadenopathy:      Cervical: No cervical adenopathy.   Skin:     General: Skin is warm and dry.      Findings: No erythema or rash.   Neurological:      Mental Status: She is alert and oriented to person, place, and time.      Cranial Nerves: No cranial nerve deficit.      Coordination: Coordination normal.      Deep Tendon Reflexes: Reflexes are normal and symmetric.   Psychiatric:         Behavior: Behavior normal.         Thought Content: Thought content normal.         Judgment: Judgment normal.         Diagnoses and all orders for this visit:    Urine frequency  -     POC Urinalysis " Dipstick, Automated  -     Urine Culture - Urine, Urine, Clean Catch    Female bladder prolapse  -     Ambulatory Referral to Gynecology    Right lower quadrant abdominal mass check CT abdomen and pelvis.  Recent lab work okay.  -     CT Abdomen Pelvis Without Contrast; Future

## 2021-07-27 LAB
BACTERIA UR CULT: ABNORMAL
OTHER ANTIBIOTIC SUSC ISLT: ABNORMAL

## 2021-07-28 ENCOUNTER — HOSPITAL ENCOUNTER (OUTPATIENT)
Dept: CT IMAGING | Facility: HOSPITAL | Age: 76
Discharge: HOME | End: 2021-07-28
Admitting: INTERNAL MEDICINE

## 2021-07-28 DIAGNOSIS — R19.03 RIGHT LOWER QUADRANT ABDOMINAL MASS: ICD-10-CM

## 2021-07-28 PROCEDURE — 74176 CT ABD & PELVIS W/O CONTRAST: CPT

## 2021-08-16 DIAGNOSIS — R19.03 RIGHT LOWER QUADRANT ABDOMINAL MASS: Primary | ICD-10-CM

## 2021-08-17 NOTE — PROGRESS NOTES
Patient: Marisol Muro    YOB: 1945    Date: 08/18/2021    Primary Care Provider: Mati Samson MD    Chief Complaint   Patient presents with   • Abdominal Pain     lower, lump there       SUBJECTIVE:    History of present illness:  I saw the patient in the office today for evaluation and consultation of lower right side abdominal pain. Patient states pain has been ongoing for 3-4 weeks ago and she has a lump in right lower abdomen/groin area. CT scan showed soft tissue mass in right inguinal region.  Patient had drainage and a great exudate at the area.  No significant redness.  No fever or chills.  Patient had an angiogram via the right femoral artery and has developed this hematoma since the procedure.    The following portions of the patient's history were reviewed and updated as appropriate: allergies, current medications, past family history, past medical history, past social history, past surgical history and problem list.      Review of Systems   HENT: Negative for trouble swallowing and voice change.    Gastrointestinal: Positive for abdominal pain. Negative for anal bleeding, blood in stool, constipation, diarrhea, nausea, rectal pain and vomiting.       Allergies:  Allergies   Allergen Reactions   • Tramadol Nausea Only       Medications:    Current Outpatient Medications:   •  acetaminophen (TYLENOL) 650 MG 8 hr tablet, Take 650 mg by mouth 2 (Two) Times a Day., Disp: , Rfl:   •  acyclovir (ZOVIRAX) 400 MG tablet, Take 1 tablet by mouth Every 4 (Four) Hours While Awake. Take no more than 5 doses a day., Disp: 50 tablet, Rfl: 3  •  cetirizine (zyrTEC) 10 MG tablet, Take 1 tablet by mouth Daily. (Patient taking differently: Take 10 mg by mouth Daily. As needed), Disp: 30 tablet, Rfl: 11  •  Diclofenac Sodium (VOLTAREN) 1 % gel gel, APPLY (2G) BY TOPICAL ROUTE 2 TIMES EVERY DAY TO THE AFFECTED AREA(S), Disp: , Rfl:   •  fluticasone (FLONASE) 50 MCG/ACT nasal spray, INSTILL 2 SPRAYS IN  "EACH NOSTRIL DAILY, Disp: 48 mL, Rfl: 1  •  folic acid (FOLVITE) 1 MG tablet, Take 1 mg by mouth Daily., Disp: , Rfl: 0  •  hydroCHLOROthiazide (HYDRODIURIL) 12.5 MG tablet, Take 1 tablet by mouth Daily., Disp: 90 tablet, Rfl: 3  •  hydroxychloroquine (PLAQUENIL) 200 MG tablet, 200 mg 2 (Two) Times a Day., Disp: , Rfl: 1  •  methotrexate 2.5 MG tablet, Take 15 mg by mouth 1 (One) Time Per Week., Disp: , Rfl: 1  •  nitrofurantoin, macrocrystal-monohydrate, (Macrobid) 100 MG capsule, Take 1 capsule by mouth 2 (Two) Times a Day., Disp: 10 capsule, Rfl: 0  •  omeprazole (priLOSEC) 20 MG capsule, TAKE 1 CAPSULE BY MOUTH EVERY DAY, Disp: 90 capsule, Rfl: 3    History:  Past Medical History:   Diagnosis Date   • Asymptomatic bacteriuria    • Esophageal stricture    • GERD (gastroesophageal reflux disease)    • HL (hearing loss)    • Hypertension    • Impaired fasting glucose    • Obesity    • Osteoarthritis    • Rheumatoid arthritis (CMS/HCC)        Past Surgical History:   Procedure Laterality Date   • COLONOSCOPY     • JOINT REPLACEMENT     • REPLACEMENT TOTAL KNEE BILATERAL      ,    • TOTAL HIP ARTHROPLASTY  2014    right       Family History   Problem Relation Age of Onset   • Stroke Mother    • Arthritis Mother    • Heart disease Mother    • Lung cancer Father    • Cancer Father    • Hyperlipidemia Sister        Social History     Tobacco Use   • Smoking status: Former Smoker     Packs/day: 1.00     Years: 20.00     Pack years: 20.00     Quit date: 1983     Years since quittin.6   • Smokeless tobacco: Former User     Quit date: 1985   Substance Use Topics   • Alcohol use: Yes     Comment: rare    • Drug use: No        OBJECTIVE:    Vital Signs:   Vitals:    21 1005   BP: 144/86   Pulse: 86   Temp: 97.7 °F (36.5 °C)   SpO2: 98%   Weight: 102 kg (224 lb)   Height: 175.3 cm (69.02\")       Physical Exam:   General Appearance:    Alert, cooperative, in no acute distress   Head:    " Normocephalic, without obvious abnormality, atraumatic   Eyes:            Lids and lashes normal, conjunctivae and sclerae normal, no   icterus, no pallor, corneas clear, PERRLA   Ears:    Ears appear intact with no abnormalities noted   Throat:   No oral lesions, no thrush, oral mucosa moist   Neck:   No adenopathy, supple, trachea midline, no thyromegaly, no   carotid bruit, no JVD   Lungs:     Clear to auscultation,respirations regular, even and                  unlabored    Heart:    Regular rhythm and normal rate, normal S1 and S2, no            murmur, no gallop, no rub, no click   Chest Wall:    No abnormalities observed   Abdomen:     Normal bowel sounds, no masses, no organomegaly, soft        non-tender, non-distended, no guarding, no rebound                tenderness   Extremities:   Moves all extremities well, no edema, no cyanosis, no             Redness.  Right groin with drainage and fluctuant mass.  Significant for abscess and infected hematoma.   Pulses:   Pulses palpable and equal bilaterally   Skin:   No bleeding, bruising or rash   Lymph nodes:   No palpable adenopathy   Neurologic:   Cranial nerves 2 - 12 grossly intact, sensation intact, DTR       present and equal bilaterally     Results Review:   I reviewed the patient's new clinical results.    Review of Systems was reviewed and confirmed as accurate as documented by the MA.    ASSESSMENT/PLAN:    1. Inguinal mass    2. Abscess of right groin        Procedure: Incision and drainage deep abscess right groin    I recommended abscess drainage to the patient. I explained the indication as well as the risks and benefits which include bleeding, further infection requiring additional procedures, non healing of the wound etc. The patient understands these and wishes to proceed.      The patient was brought to the procedure room. Consent and time out were performed. The area was prepped and draped in the usual fashion. 1% lidocaine with epinephrine  was infused locally. The abscess was then incised and drained sharply with a #11 blade. Purulent contents were evacuated and irrigated with saline and peroxide. Minimal blood loss had occurred and was well controlled with pressure. There were no complications and the patient tolerated the procedure well. Wound instructions were given.  Follow-up in 2 weeks.    I discussed the patients findings and my recommendations with patient        Electronically signed by Tanmay Clements MD  08/18/21

## 2021-08-18 ENCOUNTER — OFFICE VISIT (OUTPATIENT)
Dept: SURGERY | Facility: CLINIC | Age: 76
End: 2021-08-18

## 2021-08-18 VITALS
SYSTOLIC BLOOD PRESSURE: 144 MMHG | BODY MASS INDEX: 33.18 KG/M2 | TEMPERATURE: 97.7 F | OXYGEN SATURATION: 98 % | WEIGHT: 224 LBS | DIASTOLIC BLOOD PRESSURE: 86 MMHG | HEART RATE: 86 BPM | HEIGHT: 69 IN

## 2021-08-18 DIAGNOSIS — L02.214 ABSCESS OF RIGHT GROIN: ICD-10-CM

## 2021-08-18 DIAGNOSIS — R19.09 INGUINAL MASS: Primary | ICD-10-CM

## 2021-08-18 PROCEDURE — 10061 I&D ABSCESS COMP/MULTIPLE: CPT | Performed by: SURGERY

## 2021-08-18 PROCEDURE — 99203 OFFICE O/P NEW LOW 30 MIN: CPT | Performed by: SURGERY

## 2021-08-26 ENCOUNTER — APPOINTMENT (OUTPATIENT)
Dept: ULTRASOUND IMAGING | Facility: HOSPITAL | Age: 76
End: 2021-08-26

## 2021-09-02 ENCOUNTER — OFFICE VISIT (OUTPATIENT)
Dept: OBSTETRICS AND GYNECOLOGY | Facility: CLINIC | Age: 76
End: 2021-09-02

## 2021-09-02 VITALS
SYSTOLIC BLOOD PRESSURE: 136 MMHG | DIASTOLIC BLOOD PRESSURE: 74 MMHG | BODY MASS INDEX: 33.62 KG/M2 | HEIGHT: 69 IN | WEIGHT: 227 LBS

## 2021-09-02 DIAGNOSIS — N81.6 RECTOCELE: ICD-10-CM

## 2021-09-02 DIAGNOSIS — R32 URINARY INCONTINENCE, UNSPECIFIED TYPE: ICD-10-CM

## 2021-09-02 DIAGNOSIS — R39.198 DIFFICULTY VOIDING: ICD-10-CM

## 2021-09-02 DIAGNOSIS — N95.2 POSTMENOPAUSAL ATROPHIC VAGINITIS: ICD-10-CM

## 2021-09-02 DIAGNOSIS — R19.09 MASS OF RIGHT INGUINAL REGION: ICD-10-CM

## 2021-09-02 DIAGNOSIS — R30.0 DYSURIA: ICD-10-CM

## 2021-09-02 DIAGNOSIS — K59.00 CONSTIPATION, UNSPECIFIED CONSTIPATION TYPE: ICD-10-CM

## 2021-09-02 DIAGNOSIS — N81.11 CYSTOCELE, MIDLINE: Primary | ICD-10-CM

## 2021-09-02 PROCEDURE — 99204 OFFICE O/P NEW MOD 45 MIN: CPT | Performed by: OBSTETRICS & GYNECOLOGY

## 2021-09-02 RX ORDER — CONJUGATED ESTROGENS 0.62 MG/G
CREAM VAGINAL
Qty: 1 EACH | Refills: 11 | Status: SHIPPED | OUTPATIENT
Start: 2021-09-02 | End: 2022-04-27 | Stop reason: ALTCHOICE

## 2021-09-02 RX ORDER — SULFAMETHOXAZOLE AND TRIMETHOPRIM 800; 160 MG/1; MG/1
1 TABLET ORAL 2 TIMES DAILY
Qty: 14 TABLET | Refills: 0 | Status: SHIPPED | OUTPATIENT
Start: 2021-09-02 | End: 2021-09-09

## 2021-09-03 ENCOUNTER — TELEPHONE (OUTPATIENT)
Dept: OBSTETRICS AND GYNECOLOGY | Facility: CLINIC | Age: 76
End: 2021-09-03

## 2021-09-03 NOTE — TELEPHONE ENCOUNTER
Pharmacy called wanting to know if it is okay for patient to take Bactrim where she is on Methotrexate, pharmacist advised it is a bone marrow suppressant, should she switch to something different or stop taking the methotrexate while taking Bactrim.

## 2021-09-03 NOTE — PROGRESS NOTES
"Patient: Marisol Muro    YOB: 1945    Date: 09/10/2021    Primary Care Provider: Mati Samson MD    Chief Complaint   Patient presents with   • Follow-up     groin abscess.       History of present illness:  I saw the patient in the office today as a followup from their recent I&D of a deep right groin abscess.  They state that they have done well and are having no problems.    Vital Signs:  Vitals:    09/10/21 1304   BP: 142/86   Pulse: 94   Resp: 16   Temp: 97.6 °F (36.4 °C)   TempSrc: Temporal   SpO2: 98%   Weight: 103 kg (226 lb)   Height: 175.3 cm (69\")       Physical Exam:   General Appearance:    Alert, cooperative, in no acute distress, wound clean dry without infection   Abdomen:     no masses, no organomegaly, soft non-tender, non-distended, no guarding, wounds are well healed   Chest:      Clear to ausculation       Assessment / Plan:    1. Postoperative visit        I did discuss the situation with the patient today in the office and they have done well from their recent lesion excision, I don't think that the patient needs any further intervention and I need to see the patient back in 6 weeks for reevaluation.  Make sure there is no underlying lymph node or mass but need to be concerned about.    Electronically signed by Tanmay Clements MD  09/10/21                      "

## 2021-09-06 NOTE — PROGRESS NOTES
Chief Complaint  Vaginal Prolapse (Patient complains of prolapse since beginning of July, also complains of cyst in right groin. )     History of Present Illness:  Patient is 76 y.o.  who presents to [facility] is a new patient for evaluation of prolapse.  Patient also with complaints of a lesion in her right groin.  Patient gives a history of having the onset of pelvic pressure and bulge per vagina since the beginning of July.  Patient denies any prolapse of tissue outside of the vagina.  Patient reports she has not done any recent heavy lifting or straining.  Patient does report having problems with constipation.  Patient does report she has hard bulky stools.  Patient does have to strain with defecation and has occasional splinting with defecation as well.  Patient does report having occasional urinary incontinence.  Patient reports she will have urgency and urge incontinence as well as occasional stress urinary incontinence.  Patient will also have nocturia.  The patient does report at times she is having difficulty voiding.  Patient reports this especially if she is constipated.  Patient has however recently had the onset of dysuria as well.  Patient has not been on any hormone replacement therapy.  She has not been on any estrogen therapy either.  Patient sees Dr. Samson for her primary care.  Patient does report having a lesion in her right groin.  Patient had an incision and drainage of her right groin lesion recently with Dr. Clements.  Patient reports she is now having some redness around the drainage site.  She denies any fever or chills.  She does have a follow-up appointment back with Dr. Clements.    History  Past Medical History:   Diagnosis Date   • Asymptomatic bacteriuria    • Esophageal stricture    • GERD (gastroesophageal reflux disease)    • HL (hearing loss)    • Hypertension    • Impaired fasting glucose    • Obesity    • Osteoarthritis    • Rheumatoid arthritis  (CMS/[facility])      Current Outpatient Medications on File Prior to Visit   Medication Sig Dispense Refill   • acetaminophen (TYLENOL) 650 MG 8 hr tablet Take 650 mg by mouth 2 (Two) Times a Day.     • acyclovir (ZOVIRAX) 400 MG tablet Take 1 tablet by mouth Every 4 (Four) Hours While Awake. Take no more than 5 doses a day. 50 tablet 3   • cetirizine (zyrTEC) 10 MG tablet Take 1 tablet by mouth Daily. (Patient taking differently: Take 10 mg by mouth Daily. As needed) 30 tablet 11   • Diclofenac Sodium (VOLTAREN) 1 % gel gel APPLY (2G) BY TOPICAL ROUTE 2 TIMES EVERY DAY TO THE AFFECTED AREA(S)     • fluticasone (FLONASE) 50 MCG/ACT nasal spray INSTILL 2 SPRAYS IN EACH NOSTRIL DAILY 48 mL 1   • folic acid (FOLVITE) 1 MG tablet Take 1 mg by mouth Daily.  0   • hydroCHLOROthiazide (HYDRODIURIL) 12.5 MG tablet Take 1 tablet by mouth Daily. 90 tablet 3   • hydroxychloroquine (PLAQUENIL) 200 MG tablet 200 mg 2 (Two) Times a Day.  1   • methotrexate 2.5 MG tablet Take 15 mg by mouth 1 (One) Time Per Week.  1   • nitrofurantoin, macrocrystal-monohydrate, (Macrobid) 100 MG capsule Take 1 capsule by mouth 2 (Two) Times a Day. 10 capsule 0   • omeprazole (priLOSEC) 20 MG capsule TAKE 1 CAPSULE BY MOUTH EVERY DAY 90 capsule 3     No current facility-administered medications on file prior to visit.     Allergies   Allergen Reactions   • Tramadol Nausea Only     Past Surgical History:   Procedure Laterality Date   • COLONOSCOPY     • JOINT REPLACEMENT     • REPLACEMENT TOTAL KNEE BILATERAL      2004, 2008   • TOTAL HIP ARTHROPLASTY  2014    right     Family History   Problem Relation Age of Onset   • Stroke Mother    • Arthritis Mother    • Heart disease Mother    • Lung cancer Father    • Cancer Father    • Hyperlipidemia Sister      Social History     Socioeconomic History   • Marital status:      Spouse name: [facility]   • Number of children: 2   • Years of education: Not on file   • Highest education level: Not on file   Tobacco  "Use   • Smoking status: Former Smoker     Packs/day: 1.00     Years: 20.00     Pack years: 20.00     Quit date: 1983     Years since quittin.7   • Smokeless tobacco: Former User     Quit date: 1985   Vaping Use   • Vaping Use: Never used   Substance and Sexual Activity   • Alcohol use: Yes     Comment: rare    • Drug use: No   • Sexual activity: Not Currently     Partners: Male       Physical Examination:  Vital Signs: /74   Ht 175.3 cm (69.02\")   Wt 103 kg (227 lb)   BMI 33.50 kg/m²     General Appearance: alert, appears stated age, and cooperative  Breasts: Not performed.  Abdomen: no masses, no hepatomegaly, no splenomegaly, soft non-tender, no guarding and no rebound tenderness   Groin:  Right groin with open incision approximately 1 cm with mild erythema; no drainage noted at this time.  +palpable nodule approximately 3-4 cm  Pelvic: Clinical staff was present for exam  External genitalia:  normal appearance of the external genitalia including Bartholin's and [place]'s glands.  :  urethral meatus normal;  Vaginal:  atrophic mucosal changes are present;  Cervix:  normal appearance.  Uterus:  normal size, shape and consistency.  Adnexa:  non palpable bilaterally.  Rectal:  anus visually normal appearing. recto-vaginal exam unremarkable and confirms findings;  Cystocele GRADE 1  Rectocele GRADE 2    Data Review:  The following data was reviewed by: Rafia Owens MD on 2021:     Labs:    Imaging:  CT Abdomen Pelvis Without Contrast (2021 14:56)    Medical Records:  Progress Notes by Tanmay Clements MD (2021 10:10)      Assessment and Plan   Problem List Items Addressed This Visit     None      Visit Diagnoses     Cystocele, midline    -  Primary  Pt with pelvic organ prolapse.  Risks factors discussed including increasing parity, advancing age, obesity, history of hysterectomy, and chronic constipation.  Signs and symptoms discussed.  Treatment options discussed as well " including expectant management, pelvic floor muscle exercises, pessary, medication, and surgery.  Changes in lifestyle discussed including no heavy lifting or straining, keep stools soft, and avoid increasing pressure in the area of prolapse.  Pelvic floor exercises were also discussed and reviewed.  Benefits and complications of wearing a pessary was discussed.  Estrogen therapy to strengthen the supporting structures and possibly ameliorate symptoms was discussed.  Surgical intervention with risks, complications, and benefits was also discussed.  Prescriptions given for estrogen cream as noted.  Patient is to follow-up as discussed.    Relevant Medications    conjugated estrogens (Premarin) 0.625 MG/GM vaginal cream    Postmenopausal atrophic vaginitis      Discussed various options for relief of atrophic vaginal symptoms related to menopause. Discussed local therapy for treatment of vaginal symptoms only.  Discussed the different formulation options including cream, ring, and tablets.  Discussed the low risk of systemic absorption in postmenopausal women with atrophy using 25 mcgs of estradiol on a daily basis.  Recommend low dose use 2-3x/wk for maintenance of treatment.  Other treatment options were discussed including the use of water-based and silicone-based vaginal lubricants and moisturizers.  Also discussed was the FDA approved treatment option of ospemifene for moderate to severe dyspareunia.    Relevant Medications    conjugated estrogens (Premarin) 0.625 MG/GM vaginal cream    Constipation, unspecified constipation type      Discussed various treatment options for constipation.  Pt informed in use of stool softners.  Pt also informed for need of plenty of water with 64 oz of water recommended daily.  Pt also informed regarding trial with use of Miralax 1 cap full (17 grams) mixed with 1 tablespoon of Citracel daily in 8 oz of water.  Pt to follow this with another 8 oz of water.  Instructions and  precautions given.  Pt to call if no improvement in symptoms in 2-3 weeks.      Rectocele      Patient with rectocele as noted.  I had a long detailed extensive discussion with the patient regarding various treatment options.  I have discussed with the patient the need for aggressive management of her constipation.  Instructions and precautions were given.  Patient is to follow-up as discussed.    Relevant Medications    conjugated estrogens (Premarin) 0.625 MG/GM vaginal cream    Mass of right inguinal region      Patient with continued mass of the right inguinal region.  Prescriptions given for antibiotics as noted.  Patient is to follow-up with Dr. Clements as discussed.    Relevant Medications    sulfamethoxazole-trimethoprim (Bactrim DS) 800-160 MG per tablet    Dysuria      Patient with dysuria as noted.  Prescriptions given for Bactrim as noted.  Clean-catch UA culture and sensitivity is obtained.  Patient is to call for the results.    Relevant Medications    sulfamethoxazole-trimethoprim (Bactrim DS) 800-160 MG per tablet    Difficulty voiding      Patient with difficulty voiding as noted.  Prescriptions given for estrogen cream.  Patient is instructed to apply to the periurethral area.  Patient is also instructed in the need for aggressive management of her constipation.  Patient is to follow-up as discussed.    Urinary incontinence, unspecified type      Marisol Muro was informed that urinary incontinence is the involuntary leaking of urine caused by a wide variety of factors.  It is a common condition in women that increases with age.  The patient was informed that there are different types of urinary incontinence to include stress urinary incontinence, urgency urinary incontinence, and mixed urinary incontinence as well as other subtypes.  The patient was informed that the correct diagnosis is important in the evaluation and treatment of her leaking.  The basic evaluation includes patient's history and  physical examination.  A urinary tract infection needs to be ruled out as well as urinary retention and overflow incontinence.  Sometimes additional specialized testing needs to be performed such as urodynamic testing and cystoscopy.  The various treatment options were discussed ranging from conservative  treatment to include pelvic floor exercises and modifications in lifestyle, pessary, pharmacologic, to surgical.  Locally administered estrogen may be of some benefit in women with vaginal atrophy. Because treatment options vary by incontinence type and effectiveness, it is important to determine the etiology and severity of symptoms.          Follow Up/Instructions:  Follow up as noted.  Patient was given instructions and counseling regarding her condition or for health maintenance advice. Please see specific information pulled into the AVS if appropriate.     Note: Speech recognition transcription software may have been used to dictate portions of this document.  An attempt at proofreading has been made though minor errors in transcription may still be present.    This note was electronically signed.  Rafia Owens M.D.

## 2021-09-10 ENCOUNTER — OFFICE VISIT (OUTPATIENT)
Dept: SURGERY | Facility: CLINIC | Age: 76
End: 2021-09-10

## 2021-09-10 VITALS
RESPIRATION RATE: 16 BRPM | HEIGHT: 69 IN | TEMPERATURE: 97.6 F | BODY MASS INDEX: 33.47 KG/M2 | OXYGEN SATURATION: 98 % | SYSTOLIC BLOOD PRESSURE: 142 MMHG | HEART RATE: 94 BPM | DIASTOLIC BLOOD PRESSURE: 86 MMHG | WEIGHT: 226 LBS

## 2021-09-10 DIAGNOSIS — Z48.89 POSTOPERATIVE VISIT: Primary | ICD-10-CM

## 2021-09-10 PROCEDURE — 99024 POSTOP FOLLOW-UP VISIT: CPT | Performed by: SURGERY

## 2021-09-10 RX ORDER — FLUTICASONE PROPIONATE 50 MCG
1-2 SPRAY, SUSPENSION (ML) NASAL DAILY PRN
COMMUNITY
End: 2022-04-27

## 2021-09-10 RX ORDER — CETIRIZINE HYDROCHLORIDE 10 MG/1
1 CAPSULE, LIQUID FILLED ORAL DAILY PRN
COMMUNITY
End: 2022-08-07 | Stop reason: HOSPADM

## 2021-09-10 RX ORDER — HYDROXYCHLOROQUINE SULFATE 200 MG/1
TABLET, FILM COATED ORAL
COMMUNITY
Start: 2021-08-06 | End: 2021-09-10 | Stop reason: SDUPTHER

## 2021-09-10 RX ORDER — FOLIC ACID 1 MG/1
1 TABLET ORAL
COMMUNITY
End: 2021-09-10 | Stop reason: SDUPTHER

## 2021-09-10 RX ORDER — HYDROCHLOROTHIAZIDE 12.5 MG/1
1 TABLET ORAL DAILY
COMMUNITY
End: 2022-04-01 | Stop reason: SDUPTHER

## 2021-09-23 RX ORDER — OMEPRAZOLE 20 MG/1
CAPSULE, DELAYED RELEASE ORAL
Qty: 90 CAPSULE | Refills: 3 | Status: SHIPPED | OUTPATIENT
Start: 2021-09-23 | End: 2022-10-10

## 2021-09-23 NOTE — TELEPHONE ENCOUNTER
Rx Refill Note  Requested Prescriptions     Pending Prescriptions Disp Refills   • omeprazole (priLOSEC) 20 MG capsule [Pharmacy Med Name: OMEPRAZOLE DR 20 MG CAPSULE] 90 capsule 3     Sig: TAKE 1 CAPSULE BY MOUTH EVERY DAY      Last office visit with prescribing clinician: 7/22/2021      Next office visit with prescribing clinician: 1/10/2022            LATONYA MEDLEY MA  09/23/21, 17:58 EDT

## 2021-10-01 ENCOUNTER — OFFICE VISIT (OUTPATIENT)
Dept: OBSTETRICS AND GYNECOLOGY | Facility: CLINIC | Age: 76
End: 2021-10-01

## 2021-10-01 VITALS
WEIGHT: 226 LBS | SYSTOLIC BLOOD PRESSURE: 132 MMHG | BODY MASS INDEX: 33.47 KG/M2 | HEIGHT: 69 IN | DIASTOLIC BLOOD PRESSURE: 80 MMHG

## 2021-10-01 DIAGNOSIS — K59.00 CONSTIPATION, UNSPECIFIED CONSTIPATION TYPE: ICD-10-CM

## 2021-10-01 DIAGNOSIS — R32 URINARY INCONTINENCE, UNSPECIFIED TYPE: ICD-10-CM

## 2021-10-01 DIAGNOSIS — N95.2 POSTMENOPAUSAL ATROPHIC VAGINITIS: ICD-10-CM

## 2021-10-01 DIAGNOSIS — R39.198 DIFFICULTY VOIDING: ICD-10-CM

## 2021-10-01 DIAGNOSIS — R19.09 MASS OF RIGHT INGUINAL REGION: ICD-10-CM

## 2021-10-01 DIAGNOSIS — N81.6 RECTOCELE: ICD-10-CM

## 2021-10-01 DIAGNOSIS — N81.11 CYSTOCELE, MIDLINE: Primary | ICD-10-CM

## 2021-10-01 PROCEDURE — 99214 OFFICE O/P EST MOD 30 MIN: CPT | Performed by: OBSTETRICS & GYNECOLOGY

## 2021-10-02 NOTE — PROGRESS NOTES
Chief Complaint  Follow-up (prolapse bladder)     History of Present Illness:  Patient is 76 y.o.  who presents to [facility] for follow-up evaluation of her prolapse as well as vaginal atrophy.  The patient has been using the estrogen cream as previously given.  The patient has been using the estrogen cream intravaginally as well as applying to the periurethral area.  Patient does report having improvement in her pressure and bulge per vagina.  Patient has been trying not to do any heavy lifting.  She has been trying to do her Kegel exercises.  Patient has been trying to keep her stools soft as well.  Patient reports she is eating licorice.  Patient does report having some improvement in her urinary incontinence as well.  The patient continues to have episodes of difficulty voiding.  The patient did follow-up with Dr. Clements for her pulmonary abscess.    History  Past Medical History:   Diagnosis Date   • Asymptomatic bacteriuria    • Esophageal stricture    • GERD (gastroesophageal reflux disease)    • HL (hearing loss)    • Hypertension    • Impaired fasting glucose    • Obesity    • Osteoarthritis    • Rheumatoid arthritis (HCC)      Current Outpatient Medications on File Prior to Visit   Medication Sig Dispense Refill   • acetaminophen (TYLENOL) 650 MG 8 hr tablet Take 650 mg by mouth 2 (Two) Times a Day.     • acyclovir (ZOVIRAX) 400 MG tablet Take 1 tablet by mouth Every 4 (Four) Hours While Awake. Take no more than 5 doses a day. 50 tablet 3   • Cetirizine HCl (ZyrTEC Allergy) 10 MG capsule Take 1 tablet by mouth.     • conjugated estrogens (Premarin) 0.625 MG/GM vaginal cream Use 0.5 grams intravaginally twice weekly to control symptoms. 1 each 11   • Diclofenac Sodium (Voltaren) 1 % gel gel apply (2G)  by topical route 2  times every day to the affected area(s)     • fluticasone (FLONASE) 50 MCG/ACT nasal spray 1-2 sprays into the nostril(s) as directed by provider.     •  "folic acid (FOLVITE) 1 MG tablet Take 1 mg by mouth Daily.  0   • hydroCHLOROthiazide (HYDRODIURIL) 12.5 MG tablet Take 1 tablet by mouth.     • hydroxychloroquine (PLAQUENIL) 200 MG tablet 200 mg 2 (Two) Times a Day.  1   • methotrexate 2.5 MG tablet TAKE 6 TABLETS BY MOUTH ONCE A WEEK     • omeprazole (priLOSEC) 20 MG capsule TAKE 1 CAPSULE BY MOUTH EVERY DAY 90 capsule 3     No current facility-administered medications on file prior to visit.     Allergies   Allergen Reactions   • Tramadol Nausea Only     Past Surgical History:   Procedure Laterality Date   • COLONOSCOPY     • JOINT REPLACEMENT     • REPLACEMENT TOTAL KNEE BILATERAL      ,    • TOTAL HIP ARTHROPLASTY  2014    right     Family History   Problem Relation Age of Onset   • Stroke Mother    • Arthritis Mother    • Heart disease Mother    • Lung cancer Father    • Cancer Father    • Hyperlipidemia Sister      Social History     Socioeconomic History   • Marital status:      Spouse name: Ashu   • Number of children: 2   • Years of education: Not on file   • Highest education level: Not on file   Tobacco Use   • Smoking status: Former Smoker     Packs/day: 1.00     Years: 20.00     Pack years: 20.00     Quit date: 1983     Years since quittin.7   • Smokeless tobacco: Former User     Quit date: 1985   Vaping Use   • Vaping Use: Never used   Substance and Sexual Activity   • Alcohol use: Yes     Comment: rare    • Drug use: No   • Sexual activity: Not Currently     Partners: Male       Physical Examination:  Vital Signs: /80   Ht 175.3 cm (69\")   Wt 103 kg (226 lb)   BMI 33.37 kg/m²     General Appearance: alert, appears stated age, and cooperative  Breasts: Not performed.  Abdomen: no masses, no hepatomegaly, no splenomegaly, soft non-tender, no guarding and no rebound tenderness  Pelvic: Clinical staff was present for exam  External genitalia:  normal appearance of the external genitalia including Bartholin's and " [place]'s glands.  :  urethral meatus normal;  Vaginal:  atrophic mucosal changes are present;  Cervix:  normal appearance.  Uterus:  normal size, shape and consistency.  Adnexa:  non palpable bilaterally.  Cystocele GRADE 1  Rectocele GRADE 2    Data Review:  The following data was reviewed by: Rafia Owens MD on 10/01/2021:     Labs:    Imaging:    Medical Records:  Progress Notes by Tanmay Clements MD (09/10/2021 13:00)      Assessment and Plan   Problem List Items Addressed This Visit     None      Visit Diagnoses     Cystocele, midline    -  Primary  There are no changes noted on examination.  The patient however does report improvement in her pressure.  The patient has been using the estrogen cream.  I discussed with the patient the various options.  I discussed with the patient possible fitting with pessary.  Patient desires to continue with estrogen cream at present.  Patient will follow up in 4 weeks for possible fitting with pessary.    Rectocele      There are no changes noted on examination.  Patient's symptoms however have improved.  I stressed to the patient the need to keep her stools soft with no straining with defecation.  Patient will follow up in 4 weeks as noted.    Constipation, unspecified constipation type      Patient does report having some improvement in her constipation.  Patient is to increase p.o. fluids.  I discussed with the patient aggressive management of her constipation.    Postmenopausal atrophic vaginitis      Patient with continued atrophic changes as noted.  Patient is instructed to continue the use of her estrogen cream as previously given.  Patient will follow up in 4 weeks.    Difficulty voiding      Patient with continued difficulty voiding.  I discussed with the patient applying the estrogen cream to the periurethral area.  Instructions and precautions have been given.  Patient is to follow-up as noted.    Urinary incontinence, unspecified type      Patient with  continued urinary incontinence as noted.  I discussed with the patient various treatment options.  Patient is to continue estrogen cream for another 4 weeks.  Patient will follow up at that time with possible fitting with pessary.    Mass of right inguinal region      Patient does report having improvement in her right inguinal mass.  Patient did have a follow-up appointment with Dr. Clements as noted.  Patient will follow up with him again in 6 weeks as discussed.          Follow Up/Instructions:  Follow up as noted.  Patient was given instructions and counseling regarding her condition or for health maintenance advice. Please see specific information pulled into the AVS if appropriate.     Note: Speech recognition transcription software may have been used to dictate portions of this document.  An attempt at proofreading has been made though minor errors in transcription may still be present.    This note was electronically signed.  Rafia Owens M.D.

## 2021-10-04 ENCOUNTER — TELEPHONE (OUTPATIENT)
Dept: SURGERY | Facility: CLINIC | Age: 76
End: 2021-10-04

## 2021-10-04 NOTE — TELEPHONE ENCOUNTER
Called patient to tell her appointment has been changed to 10/22/21 @ 2:00 pm with Dr Clements due to Dr Clements being out of office 10/15/21 had leave voice message.

## 2021-10-14 NOTE — PROGRESS NOTES
"Patient: Marisol Muro    YOB: 1945    Date: 10/22/2021    Primary Care Provider: Mati Samson MD    Chief Complaint   Patient presents with   • Follow-up     groin mass       History of present illness: I saw the patient in the office today as a followup from their recent I&D of a deep right groin abscess.  They state that they have done well but is still having drainage and a small hole in the area from I&D.  She also states that there is a knot.  Patient appears to have a residual area of hidradenitis in the right groin.  She has persistent drainage and granulation tissue.  There is no underlying mass and nodule present.    Vital Signs:  Vitals:    10/22/21 1352   BP: 132/78   Pulse: 75   Temp: 97.5 °F (36.4 °C)   TempSrc: Temporal   SpO2: 95%   Weight: 105 kg (231 lb)   Height: 175.3 cm (69.02\")       Physical Exam:   General Appearance:    Alert, cooperative, in no acute distress, wound clean dry without infection   Abdomen:     no masses, no organomegaly, soft non-tender, non-distended, no guarding, wounds are well healed   Chest:      Clear to ausculation  Skin-right groin to 3 cm area of hidradenitis with granulation tissue and drainage.       Assessment / Plan:    1. Hidradenitis      Patient scheduled for excision hidradenitis of the right groin.  Risk of bleeding infection and recurrence discussed and patient agreeable.  Patient requested to proceed with the procedure and had no further questions.    Electronically signed by Tanmay Clements MD  10/22/21    Portions of this note have been scribed for Tanmay Clements MD by Michelle Milligan. 10/22/2021  14:16 EDT                        "

## 2021-10-22 ENCOUNTER — OFFICE VISIT (OUTPATIENT)
Dept: SURGERY | Facility: CLINIC | Age: 76
End: 2021-10-22

## 2021-10-22 VITALS
HEART RATE: 75 BPM | OXYGEN SATURATION: 95 % | DIASTOLIC BLOOD PRESSURE: 78 MMHG | BODY MASS INDEX: 34.21 KG/M2 | SYSTOLIC BLOOD PRESSURE: 132 MMHG | HEIGHT: 69 IN | WEIGHT: 231 LBS | TEMPERATURE: 97.5 F

## 2021-10-22 DIAGNOSIS — L73.2 HIDRADENITIS: Primary | ICD-10-CM

## 2021-10-22 PROCEDURE — 99213 OFFICE O/P EST LOW 20 MIN: CPT | Performed by: SURGERY

## 2021-10-22 RX ORDER — AMOXICILLIN 500 MG/1
CAPSULE ORAL
COMMUNITY
Start: 2021-10-14 | End: 2022-04-27

## 2021-10-25 ENCOUNTER — PREP FOR SURGERY (OUTPATIENT)
Dept: OTHER | Facility: HOSPITAL | Age: 76
End: 2021-10-25

## 2021-10-25 DIAGNOSIS — L73.2 HIDRADENITIS: Primary | ICD-10-CM

## 2021-10-25 RX ORDER — SODIUM CHLORIDE 0.9 % (FLUSH) 0.9 %
10 SYRINGE (ML) INJECTION AS NEEDED
Status: CANCELLED | OUTPATIENT
Start: 2021-10-25

## 2021-10-25 RX ORDER — SODIUM CHLORIDE 0.9 % (FLUSH) 0.9 %
10 SYRINGE (ML) INJECTION EVERY 12 HOURS SCHEDULED
Status: CANCELLED | OUTPATIENT
Start: 2021-10-25

## 2021-10-25 RX ORDER — CEFAZOLIN SODIUM 2 G/50ML
2 SOLUTION INTRAVENOUS ONCE
Status: CANCELLED | OUTPATIENT
Start: 2021-10-25 | End: 2021-10-25

## 2021-10-28 ENCOUNTER — ANESTHESIA EVENT (OUTPATIENT)
Dept: PERIOP | Facility: HOSPITAL | Age: 76
End: 2021-10-28

## 2021-10-28 NOTE — ANESTHESIA PREPROCEDURE EVALUATION
Anesthesia Evaluation     Patient summary reviewed and Nursing notes reviewed   no history of anesthetic complications:  NPO Solid Status: > 8 hours  NPO Liquid Status: > 8 hours           Airway   Mallampati: II  TM distance: >3 FB  Neck ROM: full  No difficulty expected  Dental - normal exam     Pulmonary - normal exam   (+) a smoker (quite in 1983) Former,   Cardiovascular - normal exam  Exercise tolerance: good (4-7 METS)    (+) hypertension well controlled less than 2 medications,       Neuro/Psych- negative ROS  GI/Hepatic/Renal/Endo    (+) obesity,  GERD,      Musculoskeletal     Abdominal    Substance History   (+) alcohol use,      OB/GYN          Other   arthritis (RA),    history of cancer (skin) active    ROS/Med Hx Other: Esophageal stricture                  Anesthesia Plan    ASA 3     MAC   (Risks and benefits discussed including risk of aspiration, recall and dental damage. All patient questions answered. Will continue with POC.)  intravenous induction     Anesthetic plan, all risks, benefits, and alternatives have been provided, discussed and informed consent has been obtained with: patient.    Plan discussed with CRNA.

## 2021-10-29 ENCOUNTER — ANESTHESIA (OUTPATIENT)
Dept: PERIOP | Facility: HOSPITAL | Age: 76
End: 2021-10-29

## 2021-10-29 ENCOUNTER — HOSPITAL ENCOUNTER (OUTPATIENT)
Facility: HOSPITAL | Age: 76
Setting detail: HOSPITAL OUTPATIENT SURGERY
Discharge: HOME | End: 2021-10-29
Attending: SURGERY | Admitting: SURGERY

## 2021-10-29 VITALS
OXYGEN SATURATION: 95 % | DIASTOLIC BLOOD PRESSURE: 88 MMHG | WEIGHT: 231 LBS | HEART RATE: 58 BPM | HEIGHT: 69 IN | RESPIRATION RATE: 16 BRPM | TEMPERATURE: 97.2 F | SYSTOLIC BLOOD PRESSURE: 120 MMHG | BODY MASS INDEX: 34.21 KG/M2

## 2021-10-29 DIAGNOSIS — L73.2 HIDRADENITIS: ICD-10-CM

## 2021-10-29 PROCEDURE — 25010000002 PROPOFOL 10 MG/ML EMULSION: Performed by: NURSE ANESTHETIST, CERTIFIED REGISTERED

## 2021-10-29 PROCEDURE — 88305 TISSUE EXAM BY PATHOLOGIST: CPT | Performed by: SURGERY

## 2021-10-29 PROCEDURE — 25010000002 DEXAMETHASONE PER 1 MG: Performed by: NURSE ANESTHETIST, CERTIFIED REGISTERED

## 2021-10-29 PROCEDURE — 0 CEFAZOLIN SODIUM-DEXTROSE 2-3 GM-%(50ML) RECONSTITUTED SOLUTION: Performed by: SURGERY

## 2021-10-29 PROCEDURE — 11462 EXC SKN HDRDNT ING SMPL/NTRM: CPT | Performed by: SURGERY

## 2021-10-29 PROCEDURE — 25010000002 MIDAZOLAM PER 1MG: Performed by: NURSE ANESTHETIST, CERTIFIED REGISTERED

## 2021-10-29 PROCEDURE — 88304 TISSUE EXAM BY PATHOLOGIST: CPT | Performed by: SURGERY

## 2021-10-29 PROCEDURE — 25010000002 ONDANSETRON PER 1 MG: Performed by: NURSE ANESTHETIST, CERTIFIED REGISTERED

## 2021-10-29 PROCEDURE — 25010000002 FENTANYL CITRATE (PF) 50 MCG/ML SOLUTION: Performed by: NURSE ANESTHETIST, CERTIFIED REGISTERED

## 2021-10-29 PROCEDURE — 11403 EXC TR-EXT B9+MARG 2.1-3CM: CPT | Performed by: SURGERY

## 2021-10-29 PROCEDURE — 12031 INTMD RPR S/A/T/EXT 2.5 CM/<: CPT | Performed by: SURGERY

## 2021-10-29 PROCEDURE — 88312 SPECIAL STAINS GROUP 1: CPT | Performed by: SURGERY

## 2021-10-29 RX ORDER — KETAMINE HCL IN NACL, ISO-OSM 100MG/10ML
SYRINGE (ML) INJECTION AS NEEDED
Status: DISCONTINUED | OUTPATIENT
Start: 2021-10-29 | End: 2021-10-29 | Stop reason: SURG

## 2021-10-29 RX ORDER — DEXAMETHASONE SODIUM PHOSPHATE 4 MG/ML
INJECTION, SOLUTION INTRA-ARTICULAR; INTRALESIONAL; INTRAMUSCULAR; INTRAVENOUS; SOFT TISSUE AS NEEDED
Status: DISCONTINUED | OUTPATIENT
Start: 2021-10-29 | End: 2021-10-29 | Stop reason: SURG

## 2021-10-29 RX ORDER — MAGNESIUM HYDROXIDE 1200 MG/15ML
LIQUID ORAL AS NEEDED
Status: DISCONTINUED | OUTPATIENT
Start: 2021-10-29 | End: 2021-10-29 | Stop reason: HOSPADM

## 2021-10-29 RX ORDER — FENTANYL CITRATE 50 UG/ML
INJECTION, SOLUTION INTRAMUSCULAR; INTRAVENOUS AS NEEDED
Status: DISCONTINUED | OUTPATIENT
Start: 2021-10-29 | End: 2021-10-29 | Stop reason: SURG

## 2021-10-29 RX ORDER — LIDOCAINE HYDROCHLORIDE 20 MG/ML
INJECTION, SOLUTION INTRAVENOUS AS NEEDED
Status: DISCONTINUED | OUTPATIENT
Start: 2021-10-29 | End: 2021-10-29 | Stop reason: SURG

## 2021-10-29 RX ORDER — HYDROCODONE BITARTRATE AND ACETAMINOPHEN 5; 325 MG/1; MG/1
1-2 TABLET ORAL EVERY 4 HOURS PRN
Qty: 12 TABLET | Refills: 0 | Status: SHIPPED | OUTPATIENT
Start: 2021-10-29 | End: 2022-08-15

## 2021-10-29 RX ORDER — MIDAZOLAM HYDROCHLORIDE 2 MG/2ML
INJECTION, SOLUTION INTRAMUSCULAR; INTRAVENOUS AS NEEDED
Status: DISCONTINUED | OUTPATIENT
Start: 2021-10-29 | End: 2021-10-29 | Stop reason: SURG

## 2021-10-29 RX ORDER — SODIUM CHLORIDE, SODIUM LACTATE, POTASSIUM CHLORIDE, CALCIUM CHLORIDE 600; 310; 30; 20 MG/100ML; MG/100ML; MG/100ML; MG/100ML
1000 INJECTION, SOLUTION INTRAVENOUS CONTINUOUS
Status: DISCONTINUED | OUTPATIENT
Start: 2021-10-29 | End: 2021-10-29 | Stop reason: HOSPADM

## 2021-10-29 RX ORDER — CEFAZOLIN SODIUM 2 G/50ML
2 SOLUTION INTRAVENOUS ONCE
Status: COMPLETED | OUTPATIENT
Start: 2021-10-29 | End: 2021-10-29

## 2021-10-29 RX ORDER — ONDANSETRON 2 MG/ML
INJECTION INTRAMUSCULAR; INTRAVENOUS AS NEEDED
Status: DISCONTINUED | OUTPATIENT
Start: 2021-10-29 | End: 2021-10-29 | Stop reason: SURG

## 2021-10-29 RX ORDER — SODIUM CHLORIDE 0.9 % (FLUSH) 0.9 %
10 SYRINGE (ML) INJECTION AS NEEDED
Status: DISCONTINUED | OUTPATIENT
Start: 2021-10-29 | End: 2021-10-29 | Stop reason: HOSPADM

## 2021-10-29 RX ORDER — SODIUM CHLORIDE 0.9 % (FLUSH) 0.9 %
10 SYRINGE (ML) INJECTION EVERY 12 HOURS SCHEDULED
Status: DISCONTINUED | OUTPATIENT
Start: 2021-10-29 | End: 2021-10-29 | Stop reason: HOSPADM

## 2021-10-29 RX ORDER — BUPIVACAINE HYDROCHLORIDE 5 MG/ML
INJECTION, SOLUTION EPIDURAL; INTRACAUDAL AS NEEDED
Status: DISCONTINUED | OUTPATIENT
Start: 2021-10-29 | End: 2021-10-29 | Stop reason: HOSPADM

## 2021-10-29 RX ADMIN — MIDAZOLAM HYDROCHLORIDE 1 MG: 1 INJECTION, SOLUTION INTRAMUSCULAR; INTRAVENOUS at 07:30

## 2021-10-29 RX ADMIN — CEFAZOLIN SODIUM 2 G: 2 SOLUTION INTRAVENOUS at 07:35

## 2021-10-29 RX ADMIN — Medication 10 MG: at 07:34

## 2021-10-29 RX ADMIN — FENTANYL CITRATE 25 MCG: 50 INJECTION INTRAMUSCULAR; INTRAVENOUS at 07:30

## 2021-10-29 RX ADMIN — SODIUM CHLORIDE, POTASSIUM CHLORIDE, SODIUM LACTATE AND CALCIUM CHLORIDE 1000 ML: 600; 310; 30; 20 INJECTION, SOLUTION INTRAVENOUS at 06:44

## 2021-10-29 RX ADMIN — LIDOCAINE HYDROCHLORIDE 60 MG: 20 INJECTION, SOLUTION INTRAVENOUS at 07:34

## 2021-10-29 RX ADMIN — DEXAMETHASONE SODIUM PHOSPHATE 4 MG: 4 INJECTION, SOLUTION INTRAMUSCULAR; INTRAVENOUS at 07:48

## 2021-10-29 RX ADMIN — PROPOFOL 100 MCG/KG/MIN: 10 INJECTION, EMULSION INTRAVENOUS at 07:34

## 2021-10-29 RX ADMIN — Medication 10 MG: at 07:48

## 2021-10-29 RX ADMIN — FENTANYL CITRATE 25 MCG: 50 INJECTION INTRAMUSCULAR; INTRAVENOUS at 07:48

## 2021-10-29 RX ADMIN — ONDANSETRON 4 MG: 2 INJECTION INTRAMUSCULAR; INTRAVENOUS at 07:48

## 2021-10-29 NOTE — ANESTHESIA POSTPROCEDURE EVALUATION
Patient: Marisol Muro    Procedure Summary     Date: 10/29/21 Room / Location: [facility] OR  /  [facility]    Anesthesia Start: 0729 Anesthesia Stop: 0809    Procedure: GROIN HIDRADENITIS EXCISION with excision of right leg mass (Right Rectum) Diagnosis:       Hidradenitis      (Hidradenitis [L73.2])    Surgeons: Tanmay Clements MD Provider: Estefania Arnold CRNA    Anesthesia Type: MAC ASA Status: 3          Anesthesia Type: MAC    Vitals  Vitals Value Taken Time   /46 10/29/21 0809   Temp 97.2 °F (36.2 °C) 10/29/21 0809   Pulse 60 10/29/21 0809   Resp 16 10/29/21 0809   SpO2 95 % 10/29/21 0809           Post Anesthesia Care and Evaluation    Patient location during evaluation: PHASE II  Patient participation: complete - patient participated  Level of consciousness: awake and alert  Pain score: 0  Pain management: satisfactory to patient  Airway patency: patent  Anesthetic complications: No anesthetic complications  PONV Status: none  Cardiovascular status: acceptable and stable  Respiratory status: acceptable  Hydration status: acceptable

## 2021-11-03 LAB
LAB AP CASE REPORT: NORMAL
PATH REPORT.FINAL DX SPEC: NORMAL

## 2021-11-05 NOTE — PROGRESS NOTES
"Patient: Marisol Muro    YOB: 1945    Date: 11/08/2021    Primary Care Provider: Mati Samson MD    Chief Complaint   Patient presents with   • Post-op Follow-up     groin hidradenitis       History of present illness:  I saw the patient in the office today as a followup from their recent lesion excision on the groin, the pathology report did show ulcerated granulation tissue.  They state that they have done well and are having no problems.    Vital Signs:  Vitals:    11/08/21 1411   BP: 148/80   Pulse: 87   Temp: 97.8 °F (36.6 °C)   TempSrc: Temporal   SpO2: 96%   Weight: 105 kg (231 lb 7.7 oz)   Height: 175.3 cm (69.02\")       Physical Exam:   General Appearance:    Alert, cooperative, in no acute distress, wound clean dry without infection   Abdomen:     no masses, no organomegaly, soft non-tender, non-distended, no guarding, wounds are well healed   Chest:      Clear to ausculation       Assessment / Plan:    1. Postoperative visit        I did discuss the situation with the patient today in the office and they have done well from their recent lesion excision, I don't think that the patient needs any further intervention and I need to see them back only if they have further problems. Pathology report was reviewed with the patient in the office.    Electronically signed by Tanmay Clements MD  11/08/21    Portions of this note have been scribed for Tanmay Clements MD by Michelle Milligan. 11/8/2021  14:34 EST                        "

## 2021-11-08 ENCOUNTER — OFFICE VISIT (OUTPATIENT)
Dept: SURGERY | Facility: CLINIC | Age: 76
End: 2021-11-08

## 2021-11-08 VITALS
HEART RATE: 87 BPM | WEIGHT: 231.48 LBS | DIASTOLIC BLOOD PRESSURE: 80 MMHG | OXYGEN SATURATION: 96 % | TEMPERATURE: 97.8 F | HEIGHT: 69 IN | BODY MASS INDEX: 34.29 KG/M2 | SYSTOLIC BLOOD PRESSURE: 148 MMHG

## 2021-11-08 DIAGNOSIS — Z48.89 POSTOPERATIVE VISIT: Primary | ICD-10-CM

## 2021-11-08 PROCEDURE — 99024 POSTOP FOLLOW-UP VISIT: CPT | Performed by: SURGERY

## 2021-11-17 ENCOUNTER — OFFICE VISIT (OUTPATIENT)
Dept: OBSTETRICS AND GYNECOLOGY | Facility: CLINIC | Age: 76
End: 2021-11-17

## 2021-11-17 VITALS
HEIGHT: 69 IN | SYSTOLIC BLOOD PRESSURE: 138 MMHG | WEIGHT: 231 LBS | DIASTOLIC BLOOD PRESSURE: 76 MMHG | BODY MASS INDEX: 34.21 KG/M2

## 2021-11-17 DIAGNOSIS — N81.6 RECTOCELE: ICD-10-CM

## 2021-11-17 DIAGNOSIS — R39.198 DIFFICULTY VOIDING: ICD-10-CM

## 2021-11-17 DIAGNOSIS — R32 URINARY INCONTINENCE, UNSPECIFIED TYPE: ICD-10-CM

## 2021-11-17 DIAGNOSIS — K59.00 CONSTIPATION, UNSPECIFIED CONSTIPATION TYPE: ICD-10-CM

## 2021-11-17 DIAGNOSIS — N81.11 CYSTOCELE, MIDLINE: Primary | ICD-10-CM

## 2021-11-17 DIAGNOSIS — R19.09 MASS OF RIGHT INGUINAL REGION: ICD-10-CM

## 2021-11-17 DIAGNOSIS — N95.2 POSTMENOPAUSAL ATROPHIC VAGINITIS: ICD-10-CM

## 2021-11-17 PROCEDURE — 99214 OFFICE O/P EST MOD 30 MIN: CPT | Performed by: OBSTETRICS & GYNECOLOGY

## 2021-11-17 RX ORDER — ESTRADIOL 0.1 MG/G
CREAM VAGINAL
Qty: 30 G | Refills: 11 | Status: SHIPPED | OUTPATIENT
Start: 2021-11-17 | End: 2021-12-02 | Stop reason: SDUPTHER

## 2021-11-17 NOTE — PROGRESS NOTES
Chief Complaint  Follow-up (Follow up prolapse and vaginal atrophy. )     History of Present Illness:  Patient is 76 y.o.  who presents to [facility] for follow-up evaluation of her prolapse as well as vaginal atrophy.  Patient has been using the estrogen cream since her last visit.  Patient does report having some improvement in the pressure and bulge per vagina.  Patient does report symptoms worsen however throughout the day.  Patient has been trying not to do any heavy lifting.  Patient did have recent surgical excision of the right groin mass as noted.  The patient reports she is unaware of the results.  The patient has been using the estrogen cream to apply to the periurethral area as well.  Patient does report having improvement in her difficulty voiding.  The patient has been trying to keep her stools soft as well.    History  Past Medical History:   Diagnosis Date   • Asymptomatic bacteriuria    • COVID-19 vaccine series completed     Moderna   • Ear piercing    • Esophageal stricture    • GERD (gastroesophageal reflux disease)    • History of prolapse of bladder    • HL (hearing loss)     no hearing aids   • Hypertension    • Impaired fasting glucose    • Obesity    • Osteoarthritis    • Rheumatoid arthritis (HCC)    • Skin cancer     left ankle   • Wears glasses      Current Outpatient Medications on File Prior to Visit   Medication Sig Dispense Refill   • acetaminophen (TYLENOL) 650 MG 8 hr tablet Take 650 mg by mouth 2 (Two) Times a Day.     • acyclovir (ZOVIRAX) 400 MG tablet Take 1 tablet by mouth Every 4 (Four) Hours While Awake. Take no more than 5 doses a day. (Patient taking differently: Take 400 mg by mouth Daily As Needed. Take no more than 5 doses a day.) 50 tablet 3   • amoxicillin (AMOXIL) 500 MG capsule TAKE 4 CAPSULES BY MOUTH AS A SINGLE DOSE 1 HOUR BEFORE DENTAL APPOINTMENT     • Cetirizine HCl (ZyrTEC Allergy) 10 MG capsule Take 1 tablet by mouth Daily As  Needed.     • conjugated estrogens (Premarin) 0.625 MG/GM vaginal cream Use 0.5 grams intravaginally twice weekly to control symptoms. (Patient taking differently: 3 (Three) Times a Week. Use 0.5 grams intravaginally twice weekly to control symptoms.) 1 each 11   • Diclofenac Sodium (Voltaren) 1 % gel gel apply (2G)  by topical route 2  times every day to the affected area(s)     • fluticasone (FLONASE) 50 MCG/ACT nasal spray 1-2 sprays into the nostril(s) as directed by provider Daily As Needed.     • folic acid (FOLVITE) 1 MG tablet Take 1 mg by mouth Daily.  0   • hydroCHLOROthiazide (HYDRODIURIL) 12.5 MG tablet Take 1 tablet by mouth Daily.     • HYDROcodone-acetaminophen (NORCO) 5-325 MG per tablet Take 1-2 tablets by mouth Every 4 (Four) Hours As Needed (Pain). 12 tablet 0   • hydroxychloroquine (PLAQUENIL) 200 MG tablet 200 mg 2 (Two) Times a Day.  1   • methotrexate 2.5 MG tablet Take 15 mg by mouth 1 (One) Time Per Week. On Sunday     • omeprazole (priLOSEC) 20 MG capsule TAKE 1 CAPSULE BY MOUTH EVERY DAY 90 capsule 3     No current facility-administered medications on file prior to visit.     Allergies   Allergen Reactions   • Latex Rash   • Tramadol Nausea Only     Past Surgical History:   Procedure Laterality Date   • COLONOSCOPY     • ENDOSCOPY     • ESOPHAGEAL DILATATION     • GROIN HIDRADENITIS EXCISION Right 10/29/2021    Procedure: GROIN HIDRADENITIS EXCISION with excision of right leg mass;  Surgeon: Tanmay Clements MD;  Location: [facility];  Service: General;  Laterality: Right;   • JOINT REPLACEMENT     • LAPAROSCOPIC TUBAL LIGATION     • REPLACEMENT TOTAL KNEE BILATERAL      2004, 2008   • SKIN BIOPSY     • SKIN CANCER EXCISION     • TOTAL HIP ARTHROPLASTY  2014    right     Family History   Problem Relation Age of Onset   • Stroke Mother    • Arthritis Mother    • Heart disease Mother    • Lung cancer Father    • Cancer Father    • Hyperlipidemia Sister      Social History     Socioeconomic  "History   • Marital status:      Spouse name: Ashu   • Number of children: 2   Tobacco Use   • Smoking status: Former Smoker     Packs/day: 1.00     Years: 20.00     Pack years: 20.00     Quit date: 1983     Years since quittin.9   • Smokeless tobacco: Never Used   Vaping Use   • Vaping Use: Never used   Substance and Sexual Activity   • Alcohol use: Yes     Comment: rare    • Drug use: No   • Sexual activity: Defer       Physical Examination:  Vital Signs: /76   Ht 175.3 cm (69\")   Wt 105 kg (231 lb)   BMI 34.11 kg/m²     General Appearance: alert, appears stated age, and cooperative  Breasts: Not performed.  Abdomen: no masses, no hepatomegaly, no splenomegaly, soft non-tender, no guarding and no rebound tenderness  Pelvic: Clinical staff was present for exam  External genitalia:  normal appearance of the external genitalia including Bartholin's and [place]'s glands.  :  urethral meatus normal;  Vaginal:  atrophic mucosal changes are present;  Cervix:  normal appearance.  Uterus:  normal size, shape and consistency.  Adnexa:  non palpable bilaterally.  Cystocele GRADE 1  Rectocele GRADE 2    Data Review:  The following data was reviewed by: Rafia Owens MD on 2021:     Labs:  Tissue Pathology Exam (10/29/2021 07:47)    Imaging:    Medical Records:  Op Note by Tanmay Clements MD (10/29/2021 07:43)      Assessment and Plan   Problem List Items Addressed This Visit     None      Visit Diagnoses     Cystocele, midline    -  Primary  There are no changes noted on examination.  I discussed with the patient the various treatment options.  We will plan expectant management with changes in lifestyle.  Patient is instructed no heavy lifting or straining.  Patient is to continue Kegel exercises.  Patient is to continue to use her estrogen cream as discussed.    Relevant Medications    estradiol (ESTRACE) 0.1 MG/GM vaginal cream    Rectocele      There are no changes noted on examination.  I " discussed with the patient the various treatment options.  I have stressed to the patient the need to keep her stools soft with no heavy lifting or straining.  Instructions and precautions were given.  Patient is to follow-up as discussed.    Postmenopausal atrophic vaginitis      Patient with continued atrophic changes as noted.  Patient is to continue the use of her estrogen cream as discussed.    Constipation, unspecified constipation type      Patient with continued constipation as noted.  I stressed to the patient the need to keep stools soft with no straining with defecation.  Instructions and precautions are given.  Patient is to follow-up as discussed.    Difficulty voiding      Patient with improvement in her difficulty voiding.  Patient is instructed to apply the estrogen cream to the periurethral area as well.  Instructions and precautions are given.    Urinary incontinence, unspecified type      Patient with continued urinary incontinence as noted.  Patient is instructed to apply the estrogen cream to the periurethral area.  Patient is instructed in Kegel exercises as well as no heavy lifting or straining.  Instructions and precautions are given.  Patient is to follow-up as discussed.    Mass of right inguinal region      Patient with recent excision of her right inguinal mass.  Patient was made aware of the pathology results which have been reviewed with the patient today.            Follow Up/Instructions:  Follow up as noted.  Patient was given instructions and counseling regarding her condition or for health maintenance advice. Please see specific information pulled into the AVS if appropriate.     Note: Speech recognition transcription software may have been used to dictate portions of this document.  An attempt at proofreading has been made though minor errors in transcription may still be present.    This note was electronically signed.  Rafia Owens M.D.

## 2021-12-02 RX ORDER — ESTRADIOL 0.1 MG/G
CREAM VAGINAL
Qty: 30 G | Refills: 11 | Status: SHIPPED | OUTPATIENT
Start: 2021-12-02

## 2022-02-10 ENCOUNTER — LAB (OUTPATIENT)
Dept: LAB | Facility: HOSPITAL | Age: 77
End: 2022-02-10

## 2022-02-10 DIAGNOSIS — Z03.818 ENCOUNTER FOR PATIENT CONCERN ABOUT EXPOSURE TO INFECTIOUS ORGANISM: Primary | ICD-10-CM

## 2022-02-10 PROCEDURE — U0004 COV-19 TEST NON-CDC HGH THRU: HCPCS

## 2022-02-11 LAB — SARS-COV-2 RNA NOSE QL NAA+PROBE: DETECTED

## 2022-04-01 ENCOUNTER — PATIENT MESSAGE (OUTPATIENT)
Dept: INTERNAL MEDICINE | Facility: CLINIC | Age: 77
End: 2022-04-01

## 2022-04-01 RX ORDER — HYDROCHLOROTHIAZIDE 12.5 MG/1
12.5 TABLET ORAL DAILY
Qty: 90 TABLET | Refills: 3 | Status: SHIPPED | OUTPATIENT
Start: 2022-04-01 | End: 2022-08-07 | Stop reason: HOSPADM

## 2022-04-27 ENCOUNTER — OFFICE VISIT (OUTPATIENT)
Dept: OBSTETRICS AND GYNECOLOGY | Facility: CLINIC | Age: 77
End: 2022-04-27

## 2022-04-27 VITALS
BODY MASS INDEX: 33.33 KG/M2 | DIASTOLIC BLOOD PRESSURE: 68 MMHG | SYSTOLIC BLOOD PRESSURE: 130 MMHG | WEIGHT: 225 LBS | HEIGHT: 69 IN

## 2022-04-27 DIAGNOSIS — K59.00 CONSTIPATION, UNSPECIFIED CONSTIPATION TYPE: ICD-10-CM

## 2022-04-27 DIAGNOSIS — N95.2 POSTMENOPAUSAL ATROPHIC VAGINITIS: ICD-10-CM

## 2022-04-27 DIAGNOSIS — R30.0 BURNING WITH URINATION: ICD-10-CM

## 2022-04-27 DIAGNOSIS — N81.6 RECTOCELE: ICD-10-CM

## 2022-04-27 DIAGNOSIS — R39.198 DIFFICULTY VOIDING: ICD-10-CM

## 2022-04-27 DIAGNOSIS — N81.11 CYSTOCELE, MIDLINE: Primary | ICD-10-CM

## 2022-04-27 PROCEDURE — 99214 OFFICE O/P EST MOD 30 MIN: CPT | Performed by: OBSTETRICS & GYNECOLOGY

## 2022-04-27 RX ORDER — SULFAMETHOXAZOLE AND TRIMETHOPRIM 800; 160 MG/1; MG/1
1 TABLET ORAL 2 TIMES DAILY
Qty: 14 TABLET | Refills: 0 | Status: SHIPPED | OUTPATIENT
Start: 2022-04-27 | End: 2022-05-04

## 2022-04-27 RX ORDER — PHENAZOPYRIDINE HYDROCHLORIDE 100 MG/1
100 TABLET, FILM COATED ORAL 3 TIMES DAILY PRN
Qty: 21 TABLET | Refills: 0 | Status: SHIPPED | OUTPATIENT
Start: 2022-04-27 | End: 2022-06-01

## 2022-04-30 NOTE — PROGRESS NOTES
Chief Complaint  Follow-up (Patient is here today following up on prolapse and atrophy, burning with urination.  Positive Nitrites on dip)     History of Present Illness:  Patient is 77 y.o.  who presents to [facility] here for follow-up evaluation of her prolapse as well as vaginal atrophy.  Patient does report having continued dryness as well as pressure.  Patient also reports having burning with urination today.  She denies any flank pain, fever, or chills.  Patient has been trying not to do any heavy lifting.  Patient has been using her estrogen cream but not consistently.  Patient has been trying to work on her bowel movements with constipation as well.  She does report having continued episodes of constipation.  The patient also reports having continued intermittent episodes of difficulty emptying her bladder.  Patient will have to stand and change positions.  She does notice she is able to empty her bladder better after a bowel movement.    History  Past Medical History:   Diagnosis Date   • Asymptomatic bacteriuria    • COVID-19 vaccine series completed     Moderna   • Ear piercing    • Esophageal stricture    • GERD (gastroesophageal reflux disease)    • History of prolapse of bladder    • HL (hearing loss)     no hearing aids   • Hypertension    • Impaired fasting glucose    • Obesity    • Osteoarthritis    • Rheumatoid arthritis (HCC)    • Skin cancer     left ankle   • Wears glasses      Current Outpatient Medications on File Prior to Visit   Medication Sig Dispense Refill   • acetaminophen (TYLENOL) 650 MG 8 hr tablet Take 650 mg by mouth 2 (Two) Times a Day.     • acyclovir (ZOVIRAX) 400 MG tablet Take 1 tablet by mouth Every 4 (Four) Hours While Awake. Take no more than 5 doses a day. (Patient taking differently: Take 400 mg by mouth Daily As Needed. Take no more than 5 doses a day.) 50 tablet 3   • Cetirizine HCl (ZyrTEC Allergy) 10 MG capsule Take 1 tablet by mouth  Daily As Needed.     • estradiol (ESTRACE) 0.1 MG/GM vaginal cream 0.5  grams intravaginal 2x/week 30 g 11   • folic acid (FOLVITE) 1 MG tablet Take 1 mg by mouth Daily.  0   • hydroCHLOROthiazide (HYDRODIURIL) 12.5 MG tablet Take 1 tablet by mouth Daily. 90 tablet 3   • HYDROcodone-acetaminophen (NORCO) 5-325 MG per tablet Take 1-2 tablets by mouth Every 4 (Four) Hours As Needed (Pain). 12 tablet 0   • hydroxychloroquine (PLAQUENIL) 200 MG tablet 200 mg 2 (Two) Times a Day.  1   • methotrexate 2.5 MG tablet Take 15 mg by mouth 1 (One) Time Per Week. On      • omeprazole (priLOSEC) 20 MG capsule TAKE 1 CAPSULE BY MOUTH EVERY DAY 90 capsule 3     No current facility-administered medications on file prior to visit.     Allergies   Allergen Reactions   • Latex Rash   • Tramadol Nausea Only and Other (See Comments)     GI upset     Past Surgical History:   Procedure Laterality Date   • COLONOSCOPY     • ENDOSCOPY     • ESOPHAGEAL DILATATION     • GROIN HIDRADENITIS EXCISION Right 10/29/2021    Procedure: GROIN HIDRADENITIS EXCISION with excision of right leg mass;  Surgeon: Tanmay Clements MD;  Location: [facility];  Service: General;  Laterality: Right;   • JOINT REPLACEMENT     • LAPAROSCOPIC TUBAL LIGATION     • REPLACEMENT TOTAL KNEE BILATERAL      ,    • SKIN BIOPSY     • SKIN CANCER EXCISION     • TOTAL HIP ARTHROPLASTY  2014    right     Family History   Problem Relation Age of Onset   • Stroke Mother    • Arthritis Mother    • Heart disease Mother    • Lung cancer Father    • Cancer Father    • Hyperlipidemia Sister      Social History     Socioeconomic History   • Marital status:      Spouse name: [facility]   • Number of children: 2   Tobacco Use   • Smoking status: Former Smoker     Packs/day: 1.00     Years: 20.00     Pack years: 20.00     Quit date: 1983     Years since quittin.3   • Smokeless tobacco: Never Used   Vaping Use   • Vaping Use: Never used   Substance and Sexual Activity  "  • Alcohol use: Yes     Comment: rare    • Drug use: No   • Sexual activity: Defer       Physical Examination:  Vital Signs: /68   Ht 175.3 cm (69\")   Wt 102 kg (225 lb)   BMI 33.23 kg/m²     General Appearance: alert, appears stated age, and cooperative  Breasts: Not performed.  Abdomen: no masses, no hepatomegaly, no splenomegaly, soft non-tender, no guarding and no rebound tenderness  Pelvic: Clinical staff was present for exam  External genitalia:  normal appearance of the external genitalia including Bartholin's and [place]'s glands.  :  urethral meatus normal;  Vaginal:  atrophic mucosal changes are present;  Cervix:  normal appearance.  Uterus:  normal size, shape and consistency.  Adnexa:  non palpable bilaterally.  Cystocele GRADE 1  Rectocele GRADE 2    Data Review:  The following data was reviewed by: Rafia Owens MD on 04/27/2022:     Labs:    Imaging:    Medical Records:  None    Assessment and Plan   Problem List Items Addressed This Visit    None     Visit Diagnoses     Cystocele, midline    -  Primary  There are no changes noted on examination.  I have discussed with the patient the various options for management of her symptoms.  I have discussed with the patient possible fitting with a pessary.  The patient is to resume her estrogen cream as previously given consistently as discussed.  Instructions have been given.  Patient will follow-up as discussed.    Relevant Medications    phenazopyridine (Pyridium) 100 MG tablet    Burning with urination      Patient with positive nitrites on UA today.  We will send urine for clean-catch UA culture and sensitivity.  Prescription is given for Bactrim and Pyridium.  Patient is to call for her culture results.    Relevant Medications    sulfamethoxazole-trimethoprim (Bactrim DS) 800-160 MG per tablet    phenazopyridine (Pyridium) 100 MG tablet    Other Relevant Orders    Urinalysis With Microscopic - Urine, Clean Catch    Urine Culture - Urine, " Urine, Clean Catch    Rectocele      There are no changes noted on examination.  I had a long discussion with the patient regarding the various options for management of her symptoms.  I have stressed to the patient the need to keep stools soft and no straining with defecation.    Postmenopausal atrophic vaginitis      Patient with continued atrophic changes.  Patient is to resume her estrogen cream consistently as previously given.  Instructions and precautions are given.  Patient is to follow-up as discussed.    Constipation, unspecified constipation type      I discussed with the patient the need for aggressive management of her constipation.  I have discussed with the patient MiraLAX and Citrucel.  Patient is also to increase her p.o. fluids.    Difficulty voiding      Patient is to use the estrogen cream to apply to the periurethral area as noted.  We will send urine today for clean-catch UA culture and sensitivity.  Patient is to follow-up as discussed.    Relevant Orders    Urinalysis With Microscopic - Urine, Clean Catch    Urine Culture - Urine, Urine, Clean Catch          Follow Up/Instructions:  Follow up as noted.  Patient was given instructions and counseling regarding her condition or for health maintenance advice. Please see specific information pulled into the AVS if appropriate.     Note: Speech recognition transcription software may have been used to dictate portions of this document.  An attempt at proofreading has been made though minor errors in transcription may still be present.    This note was electronically signed.  Rafia Owens M.D.

## 2022-05-02 LAB
APPEARANCE UR: ABNORMAL
BACTERIA #/AREA URNS HPF: ABNORMAL /HPF
BACTERIA UR CULT: ABNORMAL
BACTERIA UR CULT: ABNORMAL
BILIRUB UR QL STRIP: NEGATIVE
COLOR UR: YELLOW
EPI CELLS #/AREA URNS HPF: ABNORMAL /HPF
GLUCOSE UR QL STRIP: NEGATIVE
HGB UR QL STRIP: ABNORMAL
KETONES UR QL STRIP: NEGATIVE
LEUKOCYTE ESTERASE UR QL STRIP: ABNORMAL
NITRITE UR QL STRIP: NEGATIVE
OTHER ANTIBIOTIC SUSC ISLT: ABNORMAL
PH UR STRIP: 6.5 [PH] (ref 5–8)
PROT UR QL STRIP: NEGATIVE
RBC #/AREA URNS HPF: ABNORMAL /HPF
SP GR UR STRIP: 1.01 (ref 1–1.03)
UROBILINOGEN UR STRIP-MCNC: ABNORMAL MG/DL
WBC #/AREA URNS HPF: ABNORMAL /HPF

## 2022-06-01 ENCOUNTER — OFFICE VISIT (OUTPATIENT)
Dept: OBSTETRICS AND GYNECOLOGY | Facility: CLINIC | Age: 77
End: 2022-06-01

## 2022-06-01 VITALS
HEIGHT: 69 IN | DIASTOLIC BLOOD PRESSURE: 72 MMHG | WEIGHT: 222 LBS | SYSTOLIC BLOOD PRESSURE: 134 MMHG | BODY MASS INDEX: 32.88 KG/M2

## 2022-06-01 DIAGNOSIS — Z46.89 ENCOUNTER FOR FITTING AND ADJUSTMENT OF PESSARY: Primary | ICD-10-CM

## 2022-06-01 DIAGNOSIS — R39.198 DIFFICULTY VOIDING: ICD-10-CM

## 2022-06-01 DIAGNOSIS — N95.2 POSTMENOPAUSAL ATROPHIC VAGINITIS: ICD-10-CM

## 2022-06-01 DIAGNOSIS — R19.00 ABDOMINAL SWELLING: ICD-10-CM

## 2022-06-01 DIAGNOSIS — K59.00 CONSTIPATION, UNSPECIFIED CONSTIPATION TYPE: ICD-10-CM

## 2022-06-01 DIAGNOSIS — R32 URINARY INCONTINENCE, UNSPECIFIED TYPE: ICD-10-CM

## 2022-06-01 DIAGNOSIS — R10.2 PELVIC PRESSURE IN FEMALE: ICD-10-CM

## 2022-06-01 DIAGNOSIS — N81.11 CYSTOCELE, MIDLINE: ICD-10-CM

## 2022-06-01 DIAGNOSIS — N81.6 RECTOCELE: ICD-10-CM

## 2022-06-01 PROCEDURE — 99214 OFFICE O/P EST MOD 30 MIN: CPT | Performed by: OBSTETRICS & GYNECOLOGY

## 2022-06-01 PROCEDURE — 57160 INSERT PESSARY/OTHER DEVICE: CPT | Performed by: OBSTETRICS & GYNECOLOGY

## 2022-06-03 NOTE — PROGRESS NOTES
Chief Complaint  Follow-up (Follow up prolapse and pessary fitting. )     History of Present Illness:  Patient is 77 y.o.  who presents to [facility] here for follow-up of her prolapse and possible fitting with pessary.  Patient reports she has been using her estrogen cream since last visit.  Patient is continuing to have pressure and bulge per vagina.  Patient is desiring to be fitted with a pessary.  Patient reports having continued episodes of difficulty voiding.  She is also continued to have episodes of urinary incontinence.  Patient does report having continued constipation as well.  She denies any splinting with defecation.  Patient does report having bloating however as well as swelling in her abdomen.  Patient reports having a fullness.  She has not had any recent imaging.  She denies any pelvic pain other than the pressure and bulge per vagina.    History  Past Medical History:   Diagnosis Date   • Asymptomatic bacteriuria    • COVID-19 vaccine series completed     Moderna   • Ear piercing    • Esophageal stricture    • GERD (gastroesophageal reflux disease)    • History of prolapse of bladder    • HL (hearing loss)     no hearing aids   • Hypertension    • Impaired fasting glucose    • Obesity    • Osteoarthritis    • Rheumatoid arthritis (HCC)    • Skin cancer     left ankle   • Wears glasses      Current Outpatient Medications on File Prior to Visit   Medication Sig Dispense Refill   • Diclofenac Sodium (Voltaren) 1 % gel gel Apply 2 g topically to the appropriate area as directed Every 12 (Twelve) Hours.     • acetaminophen (TYLENOL) 650 MG 8 hr tablet Take 650 mg by mouth 2 (Two) Times a Day.     • acyclovir (ZOVIRAX) 400 MG tablet Take 1 tablet by mouth Every 4 (Four) Hours While Awake. Take no more than 5 doses a day. (Patient taking differently: Take 400 mg by mouth Daily As Needed. Take no more than 5 doses a day.) 50 tablet 3   • Cetirizine HCl (ZyrTEC Allergy) 10  MG capsule Take 1 tablet by mouth Daily As Needed.     • Cholecalciferol 50 MCG (2000 UT) capsule Take 2,000 Units by mouth.     • estradiol (ESTRACE) 0.1 MG/GM vaginal cream 0.5  grams intravaginal 2x/week 30 g 11   • folic acid (FOLVITE) 1 MG tablet Take 1 mg by mouth Daily.  0   • hydroCHLOROthiazide (HYDRODIURIL) 12.5 MG tablet Take 1 tablet by mouth Daily. 90 tablet 3   • HYDROcodone-acetaminophen (NORCO) 5-325 MG per tablet Take 1-2 tablets by mouth Every 4 (Four) Hours As Needed (Pain). 12 tablet 0   • hydroxychloroquine (PLAQUENIL) 200 MG tablet 200 mg 2 (Two) Times a Day.  1   • methotrexate 2.5 MG tablet Take 15 mg by mouth 1 (One) Time Per Week. On Sunday     • omeprazole (priLOSEC) 20 MG capsule TAKE 1 CAPSULE BY MOUTH EVERY DAY 90 capsule 3     No current facility-administered medications on file prior to visit.     Allergies   Allergen Reactions   • Latex Rash   • Tramadol Nausea Only and Other (See Comments)     GI upset   • Wound Dressing Adhesive Other (See Comments)     slight redness at tape site     Past Surgical History:   Procedure Laterality Date   • COLONOSCOPY     • ENDOSCOPY     • ESOPHAGEAL DILATATION     • GROIN HIDRADENITIS EXCISION Right 10/29/2021    Procedure: GROIN HIDRADENITIS EXCISION with excision of right leg mass;  Surgeon: Tanmay Clements MD;  Location: [facility];  Service: General;  Laterality: Right;   • JOINT REPLACEMENT     • LAPAROSCOPIC TUBAL LIGATION     • REPLACEMENT TOTAL KNEE BILATERAL      2004, 2008   • SKIN BIOPSY     • SKIN CANCER EXCISION     • TOTAL HIP ARTHROPLASTY  2014    right     Family History   Problem Relation Age of Onset   • Stroke Mother    • Arthritis Mother    • Heart disease Mother    • Lung cancer Father    • Cancer Father    • Hyperlipidemia Sister      Social History     Socioeconomic History   • Marital status:      Spouse name: [facility]   • Number of children: 2   Tobacco Use   • Smoking status: Former Smoker     Packs/day: 1.00     Years:  "20.00     Pack years: 20.00     Quit date: 1983     Years since quittin.4   • Smokeless tobacco: Never Used   Vaping Use   • Vaping Use: Never used   Substance and Sexual Activity   • Alcohol use: Yes     Comment: rare    • Drug use: No   • Sexual activity: Defer       Physical Examination:  Vital Signs: /72   Ht 175.3 cm (69\")   Wt 101 kg (222 lb)   BMI 32.78 kg/m²     General Appearance: alert, appears stated age, and cooperative  Breasts: Not performed.  Abdomen: no masses, no hepatomegaly, no splenomegaly, soft non-tender, no guarding and no rebound tenderness; + distended with no palpable masses or fluid wave shift noted  Pelvic: Clinical staff was present for exam  External genitalia:  normal appearance of the external genitalia including Bartholin's and [place]'s glands.  :  urethral meatus normal;  Vaginal:  atrophic mucosal changes are present;  Cervix:  normal  Uterus:  NSSAP  Adnexa:  non palpable bilaterally.  Cystocele GRADE 2  Rectocele GRADE 2  Patient fitted with a size #5 incontinence dish.  Patient tolerated the procedure well.  Patient denied any discomfort or pain.    Data Review:  The following data was reviewed by: Rafia Owens MD on 2022:     Labs:    Imaging:    Medical Records:  None    Assessment and Plan   Problem List Items Addressed This Visit    None     Visit Diagnoses     Encounter for fitting and adjustment of pessary    -  Primary  Patient fitted with a size #5 incontinence dish.  Instructions and precautions are given.  Patient is to follow-up as discussed.    Cystocele, midline      Patient with cystocele as noted.  Her cystocele does appear to be slightly worse today.  Patient desired to be fitted with a pessary as noted.  Instructions and precautions are given.  Patient is to follow-up as noted.    Rectocele      Patient with rectocele which is unchanged.  I have stressed to the patient the need for aggressive management of her constipation.  Patient is " fitted with a pessary as noted.  I discussed with the patient not straining with defecation.  Patient will follow-up as noted.    Postmenopausal atrophic vaginitis      Patient with continued atrophic changes.  She reports that she has been using her estrogen cream as previously given.  Instructions and precautions are given.  Patient is to follow-up as noted.    Difficulty voiding      Patient with continued difficulty voiding.  She is fitted with an incontinence dish.  Patient is to continue the use of her estrogen cream to apply to the periurethral area.    Constipation, unspecified constipation type      I have stressed to the patient the need for aggressive management of her constipation.  I recommend patient use the MiraLAX and Metamucil as previously discussed.  Instructions and precautions are given.  Patient is to follow-up as noted.    Urinary incontinence, unspecified type      Patient is fitted with an incontinence dish as noted.  Plan pending response to treatment.    Abdominal swelling      We will schedule transvaginal ultrasound for further evaluation.  Plan pending results.    Relevant Orders    US Non-ob Transvaginal    Pelvic pressure in female      Patient with continued pelvic pressure.  Patient is fitted with a pessary as noted.  We will also schedule transvaginal ultrasound.    Relevant Orders    US Non-ob Transvaginal          Follow Up/Instructions:  Follow up as noted.  Patient was given instructions and counseling regarding her condition or for health maintenance advice. Please see specific information pulled into the AVS if appropriate.     Note: Speech recognition transcription software may have been used to dictate portions of this document.  An attempt at proofreading has been made though minor errors in transcription may still be present.    This note was electronically signed.  Rafia Owens M.D.

## 2022-06-16 ENCOUNTER — OFFICE VISIT (OUTPATIENT)
Dept: OBSTETRICS AND GYNECOLOGY | Facility: CLINIC | Age: 77
End: 2022-06-16

## 2022-06-16 VITALS
WEIGHT: 222 LBS | SYSTOLIC BLOOD PRESSURE: 132 MMHG | HEIGHT: 69 IN | DIASTOLIC BLOOD PRESSURE: 74 MMHG | BODY MASS INDEX: 32.88 KG/M2

## 2022-06-16 DIAGNOSIS — N81.6 RECTOCELE: ICD-10-CM

## 2022-06-16 DIAGNOSIS — R93.5 ABNORMAL ULTRASOUND OF ENDOMETRIUM: ICD-10-CM

## 2022-06-16 DIAGNOSIS — K59.00 CONSTIPATION, UNSPECIFIED CONSTIPATION TYPE: ICD-10-CM

## 2022-06-16 DIAGNOSIS — R10.2 PELVIC PRESSURE IN FEMALE: ICD-10-CM

## 2022-06-16 DIAGNOSIS — R32 URINARY INCONTINENCE, UNSPECIFIED TYPE: ICD-10-CM

## 2022-06-16 DIAGNOSIS — N95.2 POSTMENOPAUSAL ATROPHIC VAGINITIS: ICD-10-CM

## 2022-06-16 DIAGNOSIS — R39.198 DIFFICULTY VOIDING: ICD-10-CM

## 2022-06-16 DIAGNOSIS — N81.11 CYSTOCELE, MIDLINE: Primary | ICD-10-CM

## 2022-06-16 DIAGNOSIS — R19.00 ABDOMINAL SWELLING: ICD-10-CM

## 2022-06-16 PROCEDURE — 99214 OFFICE O/P EST MOD 30 MIN: CPT | Performed by: OBSTETRICS & GYNECOLOGY

## 2022-06-17 NOTE — PROGRESS NOTES
Chief Complaint  Follow-up (Transvaginal ultrasound- pelvic pain and bloating. )     History of Present Illness:  Patient is 77 y.o.  who presents to [facility] here for follow-up evaluation of her prolapse as well as pelvic pain and bloating.  Transvaginal ultrasound has been obtained today.  The patient has been informed regarding her findings.  She denies any vaginal bleeding or spotting.  Patient was fitted with a size #5 incontinence dish last visit.  She reports that the pessary stayed in place for 2 days.  She does report having relief of the pressure and bulge per vagina while the pessary was in place.  Patient was able to void better as well.  Patient reports the pessary came out with ambulation only.  She was not straining or doing any heavy lifting.  Patient has been using her estrogen cream.  She does desire a trial with another pessary.    History  Past Medical History:   Diagnosis Date   • Asymptomatic bacteriuria    • COVID-19 vaccine series completed     Moderna   • Ear piercing    • Esophageal stricture    • GERD (gastroesophageal reflux disease)    • History of prolapse of bladder    • HL (hearing loss)     no hearing aids   • Hypertension    • Impaired fasting glucose    • Obesity    • Osteoarthritis    • Rheumatoid arthritis (HCC)    • Skin cancer     left ankle   • Wears glasses      Current Outpatient Medications on File Prior to Visit   Medication Sig Dispense Refill   • acetaminophen (TYLENOL) 650 MG 8 hr tablet Take 650 mg by mouth 2 (Two) Times a Day.     • acyclovir (ZOVIRAX) 400 MG tablet Take 1 tablet by mouth Every 4 (Four) Hours While Awake. Take no more than 5 doses a day. (Patient taking differently: Take 400 mg by mouth Daily As Needed. Take no more than 5 doses a day.) 50 tablet 3   • Cetirizine HCl (ZyrTEC Allergy) 10 MG capsule Take 1 tablet by mouth Daily As Needed.     • Cholecalciferol 50 MCG (2000 UT) capsule Take 2,000 Units by mouth.     •  Diclofenac Sodium (Voltaren) 1 % gel gel Apply 2 g topically to the appropriate area as directed Every 12 (Twelve) Hours.     • estradiol (ESTRACE) 0.1 MG/GM vaginal cream 0.5  grams intravaginal 2x/week 30 g 11   • folic acid (FOLVITE) 1 MG tablet Take 1 mg by mouth Daily.  0   • hydroCHLOROthiazide (HYDRODIURIL) 12.5 MG tablet Take 1 tablet by mouth Daily. 90 tablet 3   • HYDROcodone-acetaminophen (NORCO) 5-325 MG per tablet Take 1-2 tablets by mouth Every 4 (Four) Hours As Needed (Pain). 12 tablet 0   • hydroxychloroquine (PLAQUENIL) 200 MG tablet 200 mg 2 (Two) Times a Day.  1   • methotrexate 2.5 MG tablet Take 15 mg by mouth 1 (One) Time Per Week. On Sunday     • omeprazole (priLOSEC) 20 MG capsule TAKE 1 CAPSULE BY MOUTH EVERY DAY 90 capsule 3     No current facility-administered medications on file prior to visit.     Allergies   Allergen Reactions   • Latex Rash   • Tramadol Nausea Only and Other (See Comments)     GI upset   • Wound Dressing Adhesive Other (See Comments)     slight redness at tape site     Past Surgical History:   Procedure Laterality Date   • COLONOSCOPY     • ENDOSCOPY     • ESOPHAGEAL DILATATION     • GROIN HIDRADENITIS EXCISION Right 10/29/2021    Procedure: GROIN HIDRADENITIS EXCISION with excision of right leg mass;  Surgeon: Tanmay Clements MD;  Location: [facility];  Service: General;  Laterality: Right;   • JOINT REPLACEMENT     • LAPAROSCOPIC TUBAL LIGATION     • REPLACEMENT TOTAL KNEE BILATERAL      2004, 2008   • SKIN BIOPSY     • SKIN CANCER EXCISION     • TOTAL HIP ARTHROPLASTY  2014    right     Family History   Problem Relation Age of Onset   • Stroke Mother    • Arthritis Mother    • Heart disease Mother    • Lung cancer Father    • Cancer Father    • Hyperlipidemia Sister      Social History     Socioeconomic History   • Marital status:      Spouse name: [facility]   • Number of children: 2   Tobacco Use   • Smoking status: Former Smoker     Packs/day: 1.00     Years:  "20.00     Pack years: 20.00     Quit date: 1983     Years since quittin.4   • Smokeless tobacco: Never Used   Vaping Use   • Vaping Use: Never used   Substance and Sexual Activity   • Alcohol use: Yes     Comment: rare    • Drug use: No   • Sexual activity: Defer       Physical Examination:  Vital Signs: /74   Ht 175.3 cm (69\")   Wt 101 kg (222 lb)   BMI 32.78 kg/m²     General Appearance: alert, appears stated age, and cooperative  Breasts: Not performed.  Abdomen: no masses, no hepatomegaly, no splenomegaly, soft non-tender, no guarding and no rebound tenderness  Pelvic: Not performed.    Data Review:  The following data was reviewed by: Rafia Owens MD on 2022:     Labs:    Imaging:  US Non-ob Transvaginal (2022 13:21)    Medical Records:  None    Assessment and Plan   Problem List Items Addressed This Visit    None     Visit Diagnoses     Cystocele, midline    -  Primary  Patient reports no changes in her symptoms.  Patient was fitted with a pessary previously as noted.  I discussed with the patient the various treatment options.  She does desire a trial with another pessary as discussed.  We will order a larger incontinence dish as discussed.  We will call the patient for placement as discussed.  Instructions and precautions have been given.    Abdominal swelling      Patient with continued abdominal bloating and swelling.  Transvaginal ultrasound was obtained today.  Patient has been informed regarding those findings.    Pelvic pressure in female      Patient with continued pelvic pressure as noted.  Transvaginal ultrasound was obtained.  Patient informed regarding those findings.  Patient will follow-up for placement of pessary as discussed.    Rectocele      Patient reports no changes in her symptoms.  I have stressed to the patient the need to keep her stools soft.  Instructions and precautions are given.  Patient is to follow-up as discussed.    Postmenopausal atrophic " vaginitis      Patient is to continue the use of her estrogen cream as discussed.  Patient will follow-up for refitting with pessary as noted.    Difficulty voiding      Patient with continued difficulty voiding with the pessary out.  She did have improvement in her symptoms with the pessary in.  Patient is to continue the use of her estrogen cream to apply to the periurethral area.  Patient will follow-up for refitting as discussed.    Constipation, unspecified constipation type      I have discussed with the patient the continued need for aggressive management of her constipation.  Instructions and precautions have been given.  Patient is instructed in no heavy lifting or straining.    Urinary incontinence, unspecified type      Patient reports no changes in her symptoms.  She did have good relief and improvement in her leaking while the pessary was in place.  Patient will follow-up for refitting as discussed.    Abnormal ultrasound of endometrium      Patient with thickened endometrium in the postmenopausal range.  Patient however has had no vaginal bleeding.  I have discussed with the patient the only definitive diagnosis is pathological with either endometrial biopsy or D&C.  I have also discussed with the patient the option of serial scans.  We will plan repeat imaging in 3 months as discussed.  Instructions and precautions are given.  Patient is in agreement with the plan.          Follow Up/Instructions:  Follow up as noted.  Patient was given instructions and counseling regarding her condition or for health maintenance advice. Please see specific information pulled into the AVS if appropriate.     Note: Speech recognition transcription software may have been used to dictate portions of this document.  An attempt at proofreading has been made though minor errors in transcription may still be present.    This note was electronically signed.  Rafia Owens M.D.

## 2022-08-05 ENCOUNTER — APPOINTMENT (OUTPATIENT)
Dept: GENERAL RADIOLOGY | Facility: HOSPITAL | Age: 77
End: 2022-08-05

## 2022-08-05 ENCOUNTER — HOSPITAL ENCOUNTER (OUTPATIENT)
Facility: HOSPITAL | Age: 77
Setting detail: OBSERVATION
Discharge: HOME | End: 2022-08-07
Attending: EMERGENCY MEDICINE | Admitting: INTERNAL MEDICINE

## 2022-08-05 ENCOUNTER — APPOINTMENT (OUTPATIENT)
Dept: CT IMAGING | Facility: HOSPITAL | Age: 77
End: 2022-08-05

## 2022-08-05 DIAGNOSIS — G45.9 TIA (TRANSIENT ISCHEMIC ATTACK): Primary | ICD-10-CM

## 2022-08-05 DIAGNOSIS — M06.9 RHEUMATOID ARTHRITIS, INVOLVING UNSPECIFIED SITE, UNSPECIFIED WHETHER RHEUMATOID FACTOR PRESENT: ICD-10-CM

## 2022-08-05 LAB
ALBUMIN SERPL-MCNC: 3.8 G/DL (ref 3.5–5.2)
ALBUMIN/GLOB SERPL: 1.7 G/DL
ALP SERPL-CCNC: 60 U/L (ref 39–117)
ALT SERPL W P-5'-P-CCNC: 12 U/L (ref 1–33)
ANION GAP SERPL CALCULATED.3IONS-SCNC: 7.5 MMOL/L (ref 5–15)
AST SERPL-CCNC: 18 U/L (ref 1–32)
BASOPHILS # BLD AUTO: 0.03 10*3/MM3 (ref 0–0.2)
BASOPHILS NFR BLD AUTO: 0.5 % (ref 0–1.5)
BILIRUB SERPL-MCNC: 0.3 MG/DL (ref 0–1.2)
BILIRUB UR QL STRIP: NEGATIVE
BUN SERPL-MCNC: 14 MG/DL (ref 8–23)
BUN/CREAT SERPL: 21.5 (ref 7–25)
CALCIUM SPEC-SCNC: 9.8 MG/DL (ref 8.6–10.5)
CHLORIDE SERPL-SCNC: 104 MMOL/L (ref 98–107)
CLARITY UR: CLEAR
CO2 SERPL-SCNC: 30.5 MMOL/L (ref 22–29)
COLOR UR: YELLOW
CREAT SERPL-MCNC: 0.65 MG/DL (ref 0.57–1)
DEPRECATED RDW RBC AUTO: 49 FL (ref 37–54)
EGFRCR SERPLBLD CKD-EPI 2021: 89.7 ML/MIN/1.73
EOSINOPHIL # BLD AUTO: 0.06 10*3/MM3 (ref 0–0.4)
EOSINOPHIL NFR BLD AUTO: 1 % (ref 0.3–6.2)
ERYTHROCYTE [DISTWIDTH] IN BLOOD BY AUTOMATED COUNT: 14.5 % (ref 12.3–15.4)
GLOBULIN UR ELPH-MCNC: 2.3 GM/DL
GLUCOSE BLDC GLUCOMTR-MCNC: 98 MG/DL (ref 70–130)
GLUCOSE SERPL-MCNC: 111 MG/DL (ref 65–99)
GLUCOSE UR STRIP-MCNC: NEGATIVE MG/DL
HBA1C MFR BLD: 5.5 % (ref 4.8–5.6)
HCT VFR BLD AUTO: 38.7 % (ref 34–46.6)
HGB BLD-MCNC: 12.8 G/DL (ref 12–15.9)
HGB UR QL STRIP.AUTO: NEGATIVE
HOLD SPECIMEN: NORMAL
HOLD SPECIMEN: NORMAL
IMM GRANULOCYTES # BLD AUTO: 0.01 10*3/MM3 (ref 0–0.05)
IMM GRANULOCYTES NFR BLD AUTO: 0.2 % (ref 0–0.5)
KETONES UR QL STRIP: NEGATIVE
LEUKOCYTE ESTERASE UR QL STRIP.AUTO: NEGATIVE
LYMPHOCYTES # BLD AUTO: 0.77 10*3/MM3 (ref 0.7–3.1)
LYMPHOCYTES NFR BLD AUTO: 12.4 % (ref 19.6–45.3)
MAGNESIUM SERPL-MCNC: 1.8 MG/DL (ref 1.6–2.4)
MCH RBC QN AUTO: 31.1 PG (ref 26.6–33)
MCHC RBC AUTO-ENTMCNC: 33.1 G/DL (ref 31.5–35.7)
MCV RBC AUTO: 93.9 FL (ref 79–97)
MONOCYTES # BLD AUTO: 0.34 10*3/MM3 (ref 0.1–0.9)
MONOCYTES NFR BLD AUTO: 5.5 % (ref 5–12)
NEUTROPHILS NFR BLD AUTO: 5.01 10*3/MM3 (ref 1.7–7)
NEUTROPHILS NFR BLD AUTO: 80.4 % (ref 42.7–76)
NITRITE UR QL STRIP: NEGATIVE
NRBC BLD AUTO-RTO: 0 /100 WBC (ref 0–0.2)
PH UR STRIP.AUTO: 5.5 [PH] (ref 5–8)
PLATELET # BLD AUTO: 204 10*3/MM3 (ref 140–450)
PMV BLD AUTO: 10 FL (ref 6–12)
POTASSIUM SERPL-SCNC: 3.8 MMOL/L (ref 3.5–5.2)
PROT SERPL-MCNC: 6.1 G/DL (ref 6–8.5)
PROT UR QL STRIP: NEGATIVE
RBC # BLD AUTO: 4.12 10*6/MM3 (ref 3.77–5.28)
SODIUM SERPL-SCNC: 142 MMOL/L (ref 136–145)
SP GR UR STRIP: 1.01 (ref 1–1.03)
T4 FREE SERPL-MCNC: 1.34 NG/DL (ref 0.93–1.7)
TROPONIN T SERPL-MCNC: <0.01 NG/ML (ref 0–0.03)
TSH SERPL DL<=0.05 MIU/L-ACNC: 1.67 UIU/ML (ref 0.27–4.2)
UROBILINOGEN UR QL STRIP: NORMAL
WBC NRBC COR # BLD: 6.22 10*3/MM3 (ref 3.4–10.8)
WHOLE BLOOD HOLD COAG: NORMAL
WHOLE BLOOD HOLD SPECIMEN: NORMAL

## 2022-08-05 PROCEDURE — G0378 HOSPITAL OBSERVATION PER HR: HCPCS

## 2022-08-05 PROCEDURE — 99220 PR INITIAL OBSERVATION CARE/DAY 70 MINUTES: CPT | Performed by: FAMILY MEDICINE

## 2022-08-05 PROCEDURE — 71045 X-RAY EXAM CHEST 1 VIEW: CPT

## 2022-08-05 PROCEDURE — 99285 EMERGENCY DEPT VISIT HI MDM: CPT

## 2022-08-05 PROCEDURE — 84484 ASSAY OF TROPONIN QUANT: CPT | Performed by: PHYSICIAN ASSISTANT

## 2022-08-05 PROCEDURE — 83735 ASSAY OF MAGNESIUM: CPT | Performed by: PHYSICIAN ASSISTANT

## 2022-08-05 PROCEDURE — 25010000002 IOPAMIDOL 61 % SOLUTION: Performed by: EMERGENCY MEDICINE

## 2022-08-05 PROCEDURE — 87635 SARS-COV-2 COVID-19 AMP PRB: CPT | Performed by: FAMILY MEDICINE

## 2022-08-05 PROCEDURE — 83036 HEMOGLOBIN GLYCOSYLATED A1C: CPT | Performed by: FAMILY MEDICINE

## 2022-08-05 PROCEDURE — 84439 ASSAY OF FREE THYROXINE: CPT | Performed by: PHYSICIAN ASSISTANT

## 2022-08-05 PROCEDURE — 80053 COMPREHEN METABOLIC PANEL: CPT | Performed by: PHYSICIAN ASSISTANT

## 2022-08-05 PROCEDURE — 82962 GLUCOSE BLOOD TEST: CPT

## 2022-08-05 PROCEDURE — 70498 CT ANGIOGRAPHY NECK: CPT

## 2022-08-05 PROCEDURE — 85025 COMPLETE CBC W/AUTO DIFF WBC: CPT | Performed by: PHYSICIAN ASSISTANT

## 2022-08-05 PROCEDURE — 70496 CT ANGIOGRAPHY HEAD: CPT

## 2022-08-05 PROCEDURE — 84443 ASSAY THYROID STIM HORMONE: CPT | Performed by: PHYSICIAN ASSISTANT

## 2022-08-05 PROCEDURE — 93005 ELECTROCARDIOGRAM TRACING: CPT

## 2022-08-05 PROCEDURE — 70450 CT HEAD/BRAIN W/O DYE: CPT

## 2022-08-05 PROCEDURE — 81003 URINALYSIS AUTO W/O SCOPE: CPT | Performed by: PHYSICIAN ASSISTANT

## 2022-08-05 PROCEDURE — C9803 HOPD COVID-19 SPEC COLLECT: HCPCS

## 2022-08-05 RX ORDER — PANTOPRAZOLE SODIUM 40 MG/1
40 TABLET, DELAYED RELEASE ORAL EVERY MORNING
Refills: 3 | Status: DISCONTINUED | OUTPATIENT
Start: 2022-08-06 | End: 2022-08-07 | Stop reason: HOSPADM

## 2022-08-05 RX ORDER — SODIUM CHLORIDE 0.9 % (FLUSH) 0.9 %
10 SYRINGE (ML) INJECTION AS NEEDED
Status: DISCONTINUED | OUTPATIENT
Start: 2022-08-05 | End: 2022-08-07 | Stop reason: HOSPADM

## 2022-08-05 RX ORDER — ASPIRIN 81 MG/1
81 TABLET ORAL ONCE
Status: COMPLETED | OUTPATIENT
Start: 2022-08-05 | End: 2022-08-05

## 2022-08-05 RX ORDER — HYDROCHLOROTHIAZIDE 12.5 MG/1
12.5 TABLET ORAL DAILY
Status: DISCONTINUED | OUTPATIENT
Start: 2022-08-06 | End: 2022-08-07 | Stop reason: HOSPADM

## 2022-08-05 RX ORDER — ATORVASTATIN CALCIUM 40 MG/1
80 TABLET, FILM COATED ORAL NIGHTLY
Status: DISCONTINUED | OUTPATIENT
Start: 2022-08-05 | End: 2022-08-07 | Stop reason: HOSPADM

## 2022-08-05 RX ORDER — HYDROXYCHLOROQUINE SULFATE 200 MG/1
200 TABLET, FILM COATED ORAL EVERY 12 HOURS SCHEDULED
Status: DISCONTINUED | OUTPATIENT
Start: 2022-08-05 | End: 2022-08-07 | Stop reason: HOSPADM

## 2022-08-05 RX ORDER — HYDROCODONE BITARTRATE AND ACETAMINOPHEN 5; 325 MG/1; MG/1
1 TABLET ORAL EVERY 4 HOURS PRN
Status: DISCONTINUED | OUTPATIENT
Start: 2022-08-05 | End: 2022-08-07 | Stop reason: HOSPADM

## 2022-08-05 RX ORDER — SODIUM CHLORIDE 0.9 % (FLUSH) 0.9 %
10 SYRINGE (ML) INJECTION EVERY 12 HOURS SCHEDULED
Status: DISCONTINUED | OUTPATIENT
Start: 2022-08-05 | End: 2022-08-07 | Stop reason: HOSPADM

## 2022-08-05 RX ORDER — ACETAMINOPHEN 325 MG/1
650 TABLET ORAL EVERY 6 HOURS PRN
Status: DISCONTINUED | OUTPATIENT
Start: 2022-08-05 | End: 2022-08-07 | Stop reason: HOSPADM

## 2022-08-05 RX ORDER — FOLIC ACID 1 MG/1
1 TABLET ORAL DAILY
Status: DISCONTINUED | OUTPATIENT
Start: 2022-08-06 | End: 2022-08-07 | Stop reason: HOSPADM

## 2022-08-05 RX ADMIN — Medication 10 ML: at 22:00

## 2022-08-05 RX ADMIN — ASPIRIN 81 MG: 81 TABLET, COATED ORAL at 22:23

## 2022-08-05 RX ADMIN — ATORVASTATIN CALCIUM 80 MG: 40 TABLET, FILM COATED ORAL at 22:23

## 2022-08-05 RX ADMIN — IOPAMIDOL 100 ML: 612 INJECTION, SOLUTION INTRAVENOUS at 19:57

## 2022-08-05 RX ADMIN — HYDROXYCHLOROQUINE SULFATE 200 MG: 200 TABLET ORAL at 22:23

## 2022-08-05 RX ADMIN — ACETAMINOPHEN 650 MG: 325 TABLET ORAL at 22:23

## 2022-08-05 NOTE — ED PROVIDER NOTES
Subjective   This is a 77-year-old female who presents to the emergency department chief complaint fatigue/weakness.  Patient states that she developed weakness in her lower extremities that started approximately 4 hours ago.  Patient also reports that she was lightheaded and had trouble finding words.  Patient denies any chest pain, shortness of breath, fever, chills, body aches.      History provided by:  Patient   used: No    Weakness - Generalized  Severity:  Moderate  Onset quality:  Gradual  Duration:  4 days  Timing:  Intermittent  Progression:  Worsening  Chronicity:  New  Context: not alcohol use, not allergies, not change in medication, not decreased sleep, not dehydration, not drug use, not increased activity, not recent infection, not stress and not urinary tract infection    Relieved by:  Nothing  Worsened by:  Nothing  Ineffective treatments:  None tried  Associated symptoms: dizziness    Associated symptoms: no anorexia, no aphasia, no ataxia, no chest pain, no cough, no drooling, no numbness in extremities, no frequency, no hematochezia, no lethargy, no nausea, no sensory-motor deficit, no shortness of breath, no stroke symptoms, no urgency and no vision change    Risk factors: no anemia, no congestive heart failure, no coronary artery disease, no diabetes, no excessive menstruation, no family hx of stroke and no heart disease        Review of Systems   Constitutional: Negative for appetite change, chills, diaphoresis and fatigue.   HENT: Negative.  Negative for drooling.    Eyes: Negative for photophobia, pain, redness and itching.   Respiratory: Negative.  Negative for cough and shortness of breath.    Cardiovascular: Negative.  Negative for chest pain and leg swelling.   Gastrointestinal: Negative.  Negative for anorexia, hematochezia and nausea.   Endocrine: Negative.  Negative for heat intolerance, polydipsia and polyuria.   Genitourinary: Negative.  Negative for decreased  urine volume, frequency and urgency.   Musculoskeletal: Negative.  Negative for back pain and gait problem.   Skin: Negative.  Negative for color change, pallor, rash and wound.   Neurological: Positive for dizziness and weakness. Negative for facial asymmetry.   Hematological: Negative.  Negative for adenopathy. Does not bruise/bleed easily.   Psychiatric/Behavioral: Negative.  Negative for confusion, decreased concentration, dysphoric mood and self-injury. The patient is not nervous/anxious and is not hyperactive.    All other systems reviewed and are negative.      Past Medical History:   Diagnosis Date   • Asymptomatic bacteriuria    • COVID-19 vaccine series completed     Moderna   • Ear piercing    • Esophageal stricture    • GERD (gastroesophageal reflux disease)    • History of prolapse of bladder    • HL (hearing loss)     no hearing aids   • Hypertension    • Impaired fasting glucose    • Obesity    • Osteoarthritis    • Rheumatoid arthritis (HCC)    • Skin cancer     left ankle   • Wears glasses        Allergies   Allergen Reactions   • Latex Rash   • Tramadol Nausea Only and Other (See Comments)     GI upset   • Wound Dressing Adhesive Other (See Comments)     slight redness at tape site       Past Surgical History:   Procedure Laterality Date   • COLONOSCOPY     • ENDOSCOPY     • ESOPHAGEAL DILATATION     • GROIN HIDRADENITIS EXCISION Right 10/29/2021    Procedure: GROIN HIDRADENITIS EXCISION with excision of right leg mass;  Surgeon: Tanmay Clements MD;  Location: [facility];  Service: General;  Laterality: Right;   • JOINT REPLACEMENT     • LAPAROSCOPIC TUBAL LIGATION     • REPLACEMENT TOTAL KNEE BILATERAL      2004, 2008   • SKIN BIOPSY     • SKIN CANCER EXCISION     • TOTAL HIP ARTHROPLASTY  2014    right       Family History   Problem Relation Age of Onset   • Stroke Mother    • Arthritis Mother    • Heart disease Mother    • Lung cancer Father    • Cancer Father    • Hyperlipidemia Sister         Social History     Socioeconomic History   • Marital status:      Spouse name: [facility]   • Number of children: 2   Tobacco Use   • Smoking status: Former Smoker     Packs/day: 1.00     Years: 20.00     Pack years: 20.00     Quit date: 1983     Years since quittin.6   • Smokeless tobacco: Never Used   Vaping Use   • Vaping Use: Never used   Substance and Sexual Activity   • Alcohol use: Yes     Comment: rare    • Drug use: No   • Sexual activity: Defer           Objective   Physical Exam  Vitals and nursing note reviewed.   Constitutional:       General: She is not in acute distress.     Appearance: Normal appearance. She is normal weight. She is not ill-appearing, toxic-appearing or diaphoretic.   HENT:      Head: Normocephalic and atraumatic.      Right Ear: Tympanic membrane, ear canal and external ear normal. There is no impacted cerumen.      Left Ear: Tympanic membrane, ear canal and external ear normal. There is no impacted cerumen.      Nose: Nose normal. No congestion or rhinorrhea.      Mouth/Throat:      Mouth: Mucous membranes are moist.      Pharynx: Oropharynx is clear. No oropharyngeal exudate or posterior oropharyngeal erythema.   Eyes:      General: No scleral icterus.        Right eye: No discharge.         Left eye: No discharge.      Extraocular Movements: Extraocular movements intact.      Conjunctiva/sclera: Conjunctivae normal.      Pupils: Pupils are equal, round, and reactive to light.   Cardiovascular:      Rate and Rhythm: Normal rate and regular rhythm.      Pulses: Normal pulses.      Heart sounds: Normal heart sounds. No murmur heard.    No friction rub. No gallop.   Pulmonary:      Effort: Pulmonary effort is normal. No respiratory distress.      Breath sounds: Normal breath sounds. No stridor. No wheezing, rhonchi or rales.   Chest:      Chest wall: No tenderness.   Abdominal:      General: Abdomen is flat. Bowel sounds are normal. There is no distension.       Palpations: There is no mass.      Tenderness: There is no abdominal tenderness. There is no right CVA tenderness, left CVA tenderness, guarding or rebound.      Hernia: No hernia is present.   Musculoskeletal:         General: No swelling, tenderness, deformity or signs of injury. Normal range of motion.      Cervical back: Normal range of motion and neck supple. No rigidity or tenderness.      Right lower leg: No edema.      Left lower leg: No edema.   Lymphadenopathy:      Cervical: No cervical adenopathy.   Skin:     General: Skin is warm and dry.      Capillary Refill: Capillary refill takes less than 2 seconds.      Coloration: Skin is not jaundiced or pale.      Findings: No bruising, erythema, lesion or rash.   Neurological:      General: No focal deficit present.      Mental Status: She is alert and oriented to person, place, and time.      Cranial Nerves: No cranial nerve deficit.      Sensory: No sensory deficit.      Motor: No weakness.      Coordination: Coordination normal.      Gait: Gait normal.      Deep Tendon Reflexes: Reflexes normal.   Psychiatric:         Mood and Affect: Mood normal.         Behavior: Behavior normal.         Thought Content: Thought content normal.         Judgment: Judgment normal.         Procedures           ED Course  ED Course as of 08/09/22 1548   Fri Aug 05, 2022   1616 Discussed care with Dr. Mendoza neurologist.  Advised me to go ahead and perform an MRI without contrast. [BH]   1620 EKG interpreted by me reveals sinus rhythm with a rate of 67 bpm.  There is low QRS voltage in chest leads.  This is an atypical appearing EKG. [TB]   1747 IMPRESSION:  Moderate atrophy with mild changes of chronic microvascular  ischemia.  No acute intracranial abnormality.    [BH]   1923 Discussed care with Dr. Ken. Patient will be admitted.  [BH]      ED Course User Index  [BH] Waldemar Thornton PA-C  [TB] Marla Jackson MD                                            MDM    Final diagnoses:   TIA (transient ischemic attack)       ED Disposition  ED Disposition     ED Disposition   Decision to Admit    Condition   --    Comment   Level of Care: Telemetry [5]   Diagnosis: TIA (transient ischemic attack) [943301]   Admitting Physician: ALANNA FLORES [[id number]]               Mati Samson MD  [address] [facility] [address]  [facility] [address] [phone]    Follow up on 8/15/2022  with [facility] 01:45pm    [facility],[address] [address] [phone]             Medication List      New Prescriptions    aspirin 81 MG EC tablet  Take 1 tablet by mouth Daily.     atorvastatin 40 MG tablet  Commonly known as: LIPITOR  Take 1 tablet by mouth Every Night.     divalproex 250 MG DR tablet  Commonly known as: DEPAKOTE  Take 1 tablet by mouth 2 (Two) Times a Day.        Changed    acetaminophen 650 MG 8 hr tablet  Commonly known as: TYLENOL  Take 1 tablet by mouth 2 (Two) Times a Day As Needed for Mild Pain .  What changed:   · when to take this  · reasons to take this     acyclovir 400 MG tablet  Commonly known as: ZOVIRAX  Take 1 tablet by mouth Every 4 (Four) Hours While Awake. Take no more than 5 doses a day.  What changed:   · when to take this  · reasons to take this        Stop    hydroCHLOROthiazide 12.5 MG tablet  Commonly known as: HYDRODIURIL     ZyrTEC Allergy 10 MG capsule  Generic drug: Cetirizine HCl           Where to Get Your Medications      These medications were sent to [id number] - [place], [place] - [address] - [phone]  - [phone] FX  [address] [facility] [address]    Phone: [phone]   · aspirin 81 MG EC tablet  · atorvastatin 40 MG tablet  · divalproex 250 MG DR tablet     Information about where to get these medications is not yet available    Ask your nurse or doctor about these medications  · acetaminophen 650 MG 8 hr tablet          Waldemar Thornton PA-C  08/05/22  1836       Waldemar Thornton PA-C  08/09/22 0076

## 2022-08-05 NOTE — H&P
[facility]   HISTORY AND PHYSICAL      Name:  Marisol Muro   Age:  77 y.o.  Sex:  female  :  1945  MRN:  [id number]   Visit Number:  [id number]  Admission Date:  2022  Date Of Service:  22  Primary Care Physician:  Mati Samson MD    Chief Complaint:     Weakness    History Of Presenting Illness:      77-year-old female with medical history of hypertension, RA, GERD, who had presented from home due to complaints of weakness.  Patient states symptoms had started around 2 PM today, described as just started feeling unwell initially.  She had significant weakness and felt very fatigued primarily in her lower extremities at the time.  She had actually went to the hair salon, had to leave early as she was feeling bad.  Family had to help her to the car due to her weakness.  They recommended she come to the emergency room and so EMS brought her in.  She notes symptoms seem to be better now.  She denies any prior issues like this.  She was unaware of any speech difficulty, however family noted this as well.  She admits to a prior history of vertigo, but this felt significantly different than that.  She denies any prior history of TIA/stroke, cardiovascular disease.  Admits to a positive family history of cardiovascular disease in her mother.  She denies any alcohol or illicit drug use.  She denies tobacco use.  She denies any recent medication changes.  No recent falls or traumas.    In the ER, patient's vital signs were hemodynamically stable on room air.  An EKG with no ischemic changes and with a sinus rhythm noted.  CT head without contrast was unremarkable for acute abnormality with moderate atrophy noted.  CT angiograms of the head and neck performed with no large vessel occlusions.  Chest x-ray was unremarkable.  CMP within normal limits, negative troponin, CBC unremarkable.  Urinalysis unremarkable.  TSH unremarkable.  Magnesium unremarkable.  ER discussed the  case with neurology, Dr. Mendoza.  He recommended admission for further work-up and management.  We were asked to admit.    Review Of Systems:    All systems were reviewed and negative except as mentioned in history of presenting illness, assessment and plan.    Past Medical History: Patient  has a past medical history of Asymptomatic bacteriuria, COVID-19 vaccine series completed, Ear piercing, Esophageal stricture, GERD (gastroesophageal reflux disease), History of prolapse of bladder, HL (hearing loss), Hypertension, Impaired fasting glucose, Obesity, Osteoarthritis, Rheumatoid arthritis (HCC), Skin cancer, and Wears glasses.    Past Surgical History: Patient  has a past surgical history that includes Replacement total knee bilateral; Total hip arthroplasty (2014); Joint replacement; Colonoscopy; Skin biopsy; Skin cancer excision; Esophagogastroduodenoscopy; Esophageal dilation; Laparoscopic tubal ligation; and Groin Hidradenitis Excision (Right, 10/29/2021).    Social History: Patient  reports that she quit smoking about 39 years ago. She has a 20.00 pack-year smoking history. She has never used smokeless tobacco. She reports current alcohol use. She reports that she does not use drugs.    Family History: Patient's family history includes Arthritis in her mother; Cancer in her father; Heart disease in her mother; Hyperlipidemia in her sister; Lung cancer in her father; Stroke in her mother.    Allergies:      Latex, Tramadol, and Wound dressing adhesive    Home Medications:    Prior to Admission Medications     Prescriptions Last Dose Informant Patient Reported? Taking?    acetaminophen (TYLENOL) 650 MG 8 hr tablet  Self Yes No    Take 650 mg by mouth 2 (Two) Times a Day.    acyclovir (ZOVIRAX) 400 MG tablet  Self No No    Take 1 tablet by mouth Every 4 (Four) Hours While Awake. Take no more than 5 doses a day.    Patient taking differently:  Take 400 mg by mouth Daily As Needed. Take no more than 5 doses a day.     "Cetirizine HCl (ZyrTEC Allergy) 10 MG capsule  Self Yes No    Take 1 tablet by mouth Daily As Needed.    Cholecalciferol 50 MCG (2000 UT) capsule   Yes No    Take 2,000 Units by mouth.    Diclofenac Sodium (Voltaren) 1 % gel gel   Yes No    Apply 2 g topically to the appropriate area as directed Every 12 (Twelve) Hours.    estradiol (ESTRACE) 0.1 MG/GM vaginal cream   No No    0.5  grams intravaginal 2x/week    folic acid (FOLVITE) 1 MG tablet  Self Yes No    Take 1 mg by mouth Daily.    hydroCHLOROthiazide (HYDRODIURIL) 12.5 MG tablet   No No    Take 1 tablet by mouth Daily.    HYDROcodone-acetaminophen (NORCO) 5-325 MG per tablet   No No    Take 1-2 tablets by mouth Every 4 (Four) Hours As Needed (Pain).    hydroxychloroquine (PLAQUENIL) 200 MG tablet  Self Yes No    200 mg 2 (Two) Times a Day.    methotrexate 2.5 MG tablet  Self Yes No    Take 15 mg by mouth 1 (One) Time Per Week. On Sunday    omeprazole (priLOSEC) 20 MG capsule  Self No No    TAKE 1 CAPSULE BY MOUTH EVERY DAY        ED Medications:    Medications   sodium chloride 0.9 % flush 10 mL (has no administration in time range)   sodium chloride 0.9 % flush 10 mL (has no administration in time range)     Vital Signs:  Temp:  [97.5 °F (36.4 °C)] 97.5 °F (36.4 °C)  Heart Rate:  [60-69] 60  Resp:  [18] 18  BP: (119-144)/() 133/68        08/05/22  1536 08/05/22  1558   Weight: 99.8 kg (220 lb) 99.8 kg (220 lb)     Body mass index is 32.49 kg/m².    Physical Exam:     Most recent vital Signs: /68   Pulse 60   Temp 97.5 °F (36.4 °C) (Oral)   Resp 18   Ht 175.3 cm (69\")   Wt 99.8 kg (220 lb)   SpO2 97%   BMI 32.49 kg/m²     Physical Exam  Constitutional:       General: She is not in acute distress.     Appearance: She is normal weight. She is not toxic-appearing.   HENT:      Head: Normocephalic and atraumatic.      Mouth/Throat:      Mouth: Mucous membranes are moist.   Eyes:      Extraocular Movements: Extraocular movements intact.      " Pupils: Pupils are equal, round, and reactive to light.      Comments: Wears glasses   Cardiovascular:      Rate and Rhythm: Normal rate and regular rhythm.      Pulses: Normal pulses.      Heart sounds: No murmur heard.    No gallop.   Pulmonary:      Effort: Pulmonary effort is normal. No respiratory distress.      Breath sounds: No wheezing or rales.   Abdominal:      General: Abdomen is flat. There is no distension.      Tenderness: There is no abdominal tenderness. There is no guarding.   Musculoskeletal:         General: Normal range of motion.      Right lower leg: No edema.      Left lower leg: No edema.   Skin:     General: Skin is warm.      Capillary Refill: Capillary refill takes less than 2 seconds.      Findings: No lesion or rash.   Neurological:      General: No focal deficit present.      Mental Status: She is alert and oriented to person, place, and time.      Cranial Nerves: No cranial nerve deficit.      Sensory: No sensory deficit.      Motor: No weakness.      Coordination: Coordination normal.   Psychiatric:         Mood and Affect: Mood normal.         Thought Content: Thought content normal.         Judgment: Judgment normal.         Laboratory data:    I have reviewed the labs done in the emergency room.    Results from last 7 days   Lab Units 08/05/22  1603   SODIUM mmol/L 142   POTASSIUM mmol/L 3.8   CHLORIDE mmol/L 104   CO2 mmol/L 30.5*   BUN mg/dL 14   CREATININE mg/dL 0.65   CALCIUM mg/dL 9.8   BILIRUBIN mg/dL 0.3   ALK PHOS U/L 60   ALT (SGPT) U/L 12   AST (SGOT) U/L 18   GLUCOSE mg/dL 111*     Results from last 7 days   Lab Units 08/05/22  1603   WBC 10*3/mm3 6.22   HEMOGLOBIN g/dL 12.8   HEMATOCRIT % 38.7   PLATELETS 10*3/mm3 204         Results from last 7 days   Lab Units 08/05/22  1603   TROPONIN T ng/mL <0.010                     Results from last 7 days   Lab Units 08/05/22  1617   COLOR UA  Yellow   GLUCOSE UA  Negative   KETONES UA  Negative   LEUKOCYTES UA  Negative   PH,  URINE  5.5   BILIRUBIN UA  Negative   UROBILINOGEN UA  0.2 E.U./dL       Pain Management Panel    There is no flowsheet data to display.         EKG:      EKG with a sinus rhythm, rate of 67, no acute ST or T wave abnormalities    Radiology:    CT Head Without Contrast    Result Date: 8/5/2022  FINAL REPORT CLINICAL HISTORY: weakness FINDINGS: Axial images of the head were obtained without contrast. Coronal reformatted images were also obtained.This study was performed with techniques to keep radiation doses as low as reasonably achievable (ALARA). Individualized dose reduction techniques using automated exposure control or adjustment of mA and/or kV according to the patient's size were employed.  There is moderate diffuse atrophy greatest in the left frontal lobe. There are mild changes of chronic microvascular ischemia.  There is no evidence of intracranial hemorrhage or mass. The ventricular size is within normal limits. There is no evidence of shift of the midline structures. No abnormal extra axial fluid collection is identified. No skull abnormality is seen on the bone window images.     Moderate atrophy with mild changes of chronic microvascular ischemia.  No acute intracranial abnormality. Authenticated and Electronically Signed by Fadi Slade III, MD on 08/05/2022 04:57:29 PM    XR Chest 1 View    Result Date: 8/5/2022  PROCEDURE: XR CHEST 1 VW-  HISTORY: Chest pain protocol  COMPARISON:  None  FINDINGS:  Portable view of the chest demonstrates the lungs to be grossly clear. There is no evidence of effusion, pneumothorax or other significant pleural disease. The mediastinum is unremarkable.  The heart size is normal.      Unremarkable portable chest.  This report was signed and finalized on 8/5/2022 4:20 PM by Damion Harvey MD.      Assessment:    1. Weakness, present on admission, improving  2. Possible TIA, present on admission  3. History of hypertension  4. History of rheumatoid arthritis on  methotrexate and Plaquenil    Plan:    Will admit for observation and for TIA work-up.  Telemetry monitoring overnight.  Allow regular diet as tolerated.  PT and OT evaluations tomorrow.  Discussed with Dr. Mendoza, will defer an MRI of the brain, this can be completed on Sunday morning if necessary.  Check lipid panel, A1c.  2D echo will be ordered.  Further recommendations will be depend upon clinical course.    Risk Assessment:  Moderate  DVT Prophylaxis: SCDs  Code Status: Full  Diet: Regular    Advance Care Planning   ACP discussion was held with the patient during this visit. Patient does not have an advance directive, declines further assistance.           Isabelle Nolan DO  08/05/22  19:10 EDT    Dictated utilizing Dragon dictation.

## 2022-08-06 LAB
CHOLEST SERPL-MCNC: 171 MG/DL (ref 0–200)
HDLC SERPL-MCNC: 83 MG/DL (ref 40–60)
LDLC SERPL CALC-MCNC: 77 MG/DL (ref 0–100)
LDLC/HDLC SERPL: 0.93 {RATIO}
SARS-COV-2 RNA PNL SPEC NAA+PROBE: NOT DETECTED
TRIGL SERPL-MCNC: 53 MG/DL (ref 0–150)
VLDLC SERPL-MCNC: 11 MG/DL (ref 5–40)

## 2022-08-06 PROCEDURE — 97161 PT EVAL LOW COMPLEX 20 MIN: CPT

## 2022-08-06 PROCEDURE — G0378 HOSPITAL OBSERVATION PER HR: HCPCS

## 2022-08-06 PROCEDURE — 80061 LIPID PANEL: CPT | Performed by: FAMILY MEDICINE

## 2022-08-06 PROCEDURE — 99204 OFFICE O/P NEW MOD 45 MIN: CPT | Performed by: PSYCHIATRY & NEUROLOGY

## 2022-08-06 PROCEDURE — 96372 THER/PROPH/DIAG INJ SC/IM: CPT

## 2022-08-06 PROCEDURE — 25010000002 ENOXAPARIN PER 10 MG: Performed by: INTERNAL MEDICINE

## 2022-08-06 PROCEDURE — 99225 PR SBSQ OBSERVATION CARE/DAY 25 MINUTES: CPT | Performed by: INTERNAL MEDICINE

## 2022-08-06 PROCEDURE — 63710000001 DIPHENHYDRAMINE PER 50 MG: Performed by: INTERNAL MEDICINE

## 2022-08-06 RX ORDER — ENOXAPARIN SODIUM 100 MG/ML
60 INJECTION SUBCUTANEOUS DAILY
Status: DISCONTINUED | OUTPATIENT
Start: 2022-08-06 | End: 2022-08-07 | Stop reason: HOSPADM

## 2022-08-06 RX ORDER — ASPIRIN 81 MG/1
81 TABLET ORAL DAILY
Status: DISCONTINUED | OUTPATIENT
Start: 2022-08-06 | End: 2022-08-07 | Stop reason: HOSPADM

## 2022-08-06 RX ORDER — DIPHENHYDRAMINE HCL 25 MG
25 CAPSULE ORAL EVERY 6 HOURS PRN
Status: DISCONTINUED | OUTPATIENT
Start: 2022-08-06 | End: 2022-08-07 | Stop reason: HOSPADM

## 2022-08-06 RX ADMIN — DIPHENHYDRAMINE HYDROCHLORIDE 25 MG: 25 CAPSULE ORAL at 14:29

## 2022-08-06 RX ADMIN — ACETAMINOPHEN 650 MG: 325 TABLET ORAL at 21:23

## 2022-08-06 RX ADMIN — Medication 10 ML: at 09:24

## 2022-08-06 RX ADMIN — ENOXAPARIN SODIUM 60 MG: 60 INJECTION SUBCUTANEOUS at 14:29

## 2022-08-06 RX ADMIN — HYDROCHLOROTHIAZIDE 12.5 MG: 12.5 CAPSULE ORAL at 09:24

## 2022-08-06 RX ADMIN — ATORVASTATIN CALCIUM 80 MG: 40 TABLET, FILM COATED ORAL at 21:23

## 2022-08-06 RX ADMIN — ASPIRIN 81 MG: 81 TABLET, COATED ORAL at 09:24

## 2022-08-06 RX ADMIN — FOLIC ACID 1 MG: 1 TABLET ORAL at 09:24

## 2022-08-06 RX ADMIN — HYDROXYCHLOROQUINE SULFATE 200 MG: 200 TABLET ORAL at 09:24

## 2022-08-06 RX ADMIN — PANTOPRAZOLE SODIUM 40 MG: 40 TABLET, DELAYED RELEASE ORAL at 06:16

## 2022-08-06 RX ADMIN — ACETAMINOPHEN 650 MG: 325 TABLET ORAL at 10:24

## 2022-08-06 RX ADMIN — HYDROXYCHLOROQUINE SULFATE 200 MG: 200 TABLET ORAL at 21:23

## 2022-08-06 NOTE — PLAN OF CARE
"Goal Outcome Evaluation:  Plan of Care Reviewed With: patient           Outcome Evaluation: PT evaluation completed this am with pt oriented x 4 and cooperative throughout. Pt presented with HA rated 4/10 supine in bed. Pt was able to come to EOB and get her shoes on in sitting with modified [place]. Pt did need CGA when standing without device with pt c/o \"stiffness\" in RLE due to her RA. Rolling walker then used for ambulation 100 ft then 40 ft with CGA. Sit to stand from toilet using grab bar with modified independence. BLE strength equal B but decreased strenght in B hips noted. In addition to deficts in strength, pt presented with deficits in endurance, and balance. Pt is expected to improve her functional mobility with continued PT services prior to d/c.  "

## 2022-08-06 NOTE — THERAPY EVALUATION
Patient Name: Marisol Muro  : 1945    MRN: [id number]                              Today's Date: 2022       Admit Date: 2022    Visit Dx:     ICD-10-CM ICD-9-CM   1. TIA (transient ischemic attack)  G45.9 435.9     Patient Active Problem List   Diagnosis   • Rheumatoid arthritis (HCC)   • Osteoarthritis   • Hypertension   • GERD (gastroesophageal reflux disease)   • Esophageal stricture   • Obesity (BMI 30-39.9)   • Iron deficiency anemia   • Hidradenitis   • TIA (transient ischemic attack)     Past Medical History:   Diagnosis Date   • Asymptomatic bacteriuria    • COVID-19 vaccine series completed     Moderna   • Ear piercing    • Esophageal stricture    • GERD (gastroesophageal reflux disease)    • History of prolapse of bladder    • HL (hearing loss)     no hearing aids   • Hypertension    • Impaired fasting glucose    • Obesity    • Osteoarthritis    • Rheumatoid arthritis (HCC)    • Skin cancer     left ankle   • Wears glasses      Past Surgical History:   Procedure Laterality Date   • COLONOSCOPY     • ENDOSCOPY     • ESOPHAGEAL DILATATION     • GROIN HIDRADENITIS EXCISION Right 10/29/2021    Procedure: GROIN HIDRADENITIS EXCISION with excision of right leg mass;  Surgeon: Tanmay Clements MD;  Location: [facility];  Service: General;  Laterality: Right;   • JOINT REPLACEMENT     • LAPAROSCOPIC TUBAL LIGATION     • REPLACEMENT TOTAL KNEE BILATERAL      ,    • SKIN BIOPSY     • SKIN CANCER EXCISION     • TOTAL HIP ARTHROPLASTY  2014    right      General Information     Row Name 22 1020          Physical Therapy Time and Intention    Document Type evaluation  -TW     Mode of Treatment physical therapy  -TW     Row Name 22 1020          General Information    Patient Profile Reviewed yes  -TW     Prior Level of Function independent:;all household mobility;community mobility  Pt did not use any assistive device for ambulation prior to admission but does have 2 rolling  "walkers at home if needed.  -TW     Existing Precautions/Restrictions fall  -TW     Barriers to Rehab hearing deficit  does not presently have hearing aides at the hospital.  -     Row Name 08/06/22 1020          Living Environment    People in Home spouse  -     Row Name 08/06/22 1020          Home Main Entrance    Number of Stairs, Main Entrance three  -TW     Stair Railings, Main Entrance railings on both sides of stairs  -     Row Name 08/06/22 1020          Stairs Within Home, Primary    Stairs, Within Home, Primary has a basement garage/finished basement with a stair lift installed. Pt does not presently use (it is for her ) but she states she will use it if needed upon d/c.  -TW     Number of Stairs, Within Home, Primary other (see comments)  -     Row Name 08/06/22 1020          Cognition    Orientation Status (Cognition) oriented x 4  -     Row Name 08/06/22 1020          Safety Issues, Functional Mobility    Safety Issues Affecting Function (Mobility) safety precaution awareness;safety precautions follow-through/compliance;other (see comments)  -TW     Impairments Affecting Function (Mobility) pain;endurance/activity tolerance;balance  -TW     Comment, Safety Issues/Impairments (Mobility) walker often too far ahead of pt during gait.  -TW           User Key  (r) = Recorded By, (t) = Taken By, (c) = Cosigned By    Initials Name Provider Type    TW Dilia Dubose, PT Physical Therapist               Mobility     Row Name 08/06/22 1020          Bed Mobility    Bed Mobility supine-sit  -TW     Supine-Sit [place] (Bed Mobility) modified independence  -TW     Assistive Device (Bed Mobility) head of bed elevated  -     Row Name 08/06/22 1020          Transfers    Comment, (Transfers) Pt did express concern over trying to take a step without an assistive device due to R LE \"stiffness\" from her RA. Decreased wt shifting noted onto the RLE during transfers and gait.  -     Row Name 08/06/22 " "1020          Bed-Chair Transfer    Bed-Chair [place] (Transfers) contact guard  -TW     Assistive Device (Bed-Chair Transfers) walker, front-wheeled  -TW     Row Name 08/06/22 1020          Sit-Stand Transfer    Sit-Stand [place] (Transfers) standby assist  -TW     Assistive Device (Sit-Stand Transfers) walker, front-wheeled  -TW     Comment, (Sit-Stand Transfer) When coming up from toilet pt did need to use grab bar to assist with coming to stand. Pt does have grab bars installed at her home and is used to using them.  -TW     Row Name 08/06/22 1020          Gait/Stairs (Locomotion)    [place] Level (Gait) contact guard;verbal cues  -TW     Assistive Device (Gait) walker, front-wheeled  -TW     Distance in Feet (Gait) 100 ft and 40 ft  -TW     Deviations/Abnormal Patterns (Gait) weight shifting decreased;stride length decreased;antalgic  -TW     Bilateral Gait Deviations heel strike decreased  -TW     Comment, (Gait/Stairs) pt does c/o increased \"stiffness\" in RLE with ambulation and demonstrated difficulty shifting wt onto the RLE during gait.  -TW           User Key  (r) = Recorded By, (t) = Taken By, (c) = Cosigned By    Initials Name Provider Type    TW Dilia Dubose, PT Physical Therapist               Obj/Interventions     Row Name 08/06/22 1020          Range of Motion Comprehensive    Comment, General Range of Motion BLE grossly WFLs  -TW     Row Name 08/06/22 1020          Strength Comprehensive (MMT)    Comment, General Manual Muscle Testing (MMT) Assessment BLE equal in strength L to R with MMT. Hips grossly 3+/5 to 4/5 and knees/ankles 4+/5 to 5/5  -TW     Row Name 08/06/22 1020          Balance    Balance Assessment sitting static balance;sitting dynamic balance;standing static balance;standing dynamic balance  -TW     Static Sitting Balance independent  -TW     Dynamic Sitting Balance independent  -TW     Position, Sitting Balance unsupported;sitting edge of bed  -TW     Static " Standing Balance standby assist  -TW     Dynamic Standing Balance standby assist;contact guard  -TW     Position/Device Used, Standing Balance supported;walker, front-wheeled  -TW     Comment, Balance While washing hands, pt did have have one episode of leaning backwards inwhich she had to grab sink to maintain balance. No c/o dizziness  -TW           User Key  (r) = Recorded By, (t) = Taken By, (c) = Cosigned By    Initials Name Provider Type    TW Sedrick, Dilia, PT Physical Therapist               Goals/Plan     Row Name 08/06/22 1020          Bed Mobility Goal 1 (PT)    Activity/Assistive Device (Bed Mobility Goal 1, PT) bed mobility activities, all  -TW     [place] Level/Cues Needed (Bed Mobility Goal 1, PT) independent  -TW     Time Frame (Bed Mobility Goal 1, PT) short term goal (STG);4 days  -TW     Progress/Outcomes (Bed Mobility Goal 1, PT) goal ongoing  -TW     Row Name 08/06/22 1020          Transfer Goal 1 (PT)    Activity/Assistive Device (Transfer Goal 1, PT) sit-to-stand/stand-to-sit;bed-to-chair/chair-to-bed;toilet;walker, rolling  -TW     [place] Level/Cues Needed (Transfer Goal 1, PT) supervision required  -TW     Time Frame (Transfer Goal 1, PT) short term goal (STG);4 days  -TW     Progress/Outcome (Transfer Goal 1, PT) goal ongoing  -TW     Row Name 08/06/22 1020          Gait Training Goal 1 (PT)    Activity/Assistive Device (Gait Training Goal 1, PT) gait (walking locomotion);assistive device use;decrease fall risk;increase endurance/gait distance;improve balance and speed  -TW     [place] Level (Gait Training Goal 1, PT) supervision required  -TW     Distance (Gait Training Goal 1, PT) 200 ft  -TW     Time Frame (Gait Training Goal 1, PT) 1 week  -TW     Progress/Outcome (Gait Training Goal 1, PT) goal ongoing  -TW     Row Name 08/06/22 1020          Problem Specific Goal 1 (PT)    Problem Specific Goal 1 (PT) Pt to perform standing ADLs x 4 minutes without loss of balance.   "-TW     Time Frame (Problem Specific Goal 1, PT) short-term goal (STG);4 days  -TW     Progress/Outcome (Problem Specific Goal 1, PT) goal ongoing  -TW     Row Name 08/06/22 1020          Patient Education Goal (PT)    Activity (Patient Education Goal, PT) BLE ther ex with focus on hip strengthening.  -TW     [place]/Cues/Accuracy (Memory Goal 2, PT) demonstrates adequately  -TW     Time Frame (Patient Education Goal, PT) 1 week  -TW     Progress/Outcome (Patient Education Goal, PT) goal ongoing  -TW     Row Name 08/06/22 1020          Therapy Assessment/Plan (PT)    Planned Therapy Interventions (PT) balance training;bed mobility training;gait training;home exercise program;patient/family education;transfer training;strengthening  -TW           User Key  (r) = Recorded By, (t) = Taken By, (c) = Cosigned By    Initials Name Provider Type    TW Dilia Dubose, PT Physical Therapist               Clinical Impression     Row Name 08/06/22 1020          Pain    Pretreatment Pain Rating 4/10  -TW     Posttreatment Pain Rating 4/10  -TW     Pain Location - Side/Orientation Bilateral  -TW     Pain Location anterior  -TW     Pain Location - head  -TW     Pre/Posttreatment Pain Comment Pt also had c/o RLE \"stiffness\" during mobility  -TW     Row Name 08/06/22 1020          Plan of Care Review    Plan of Care Reviewed With patient  -TW     Outcome Evaluation PT evaluation completed this am with pt oriented x 4 and cooperative throughout. Pt presented with HA rated 4/10 supine in bed. Pt was able to come to EOB and get her shoes on in sitting with modified [place]. Pt did need CGA when standing without device with pt c/o \"stiffness\" in RLE due to her RA. Rolling walker then used for ambulation 100 ft then 40 ft with CGA. Sit to stand from toilet using grab bar with modified independence. BLE strength equal B but decreased strenght in B hips noted. In addition to deficts in strength, pt presented with deficits in " endurance, and balance. Pt is expected to improve her functional mobility with continued PT services prior to d/c.  -TW     Row Name 08/06/22 1020          Therapy Assessment/Plan (PT)    Patient/Family Therapy Goals Statement (PT) to return home  -TW     Rehab Potential (PT) good, to achieve stated therapy goals  -TW     Criteria for Skilled Interventions Met (PT) yes;meets criteria  -TW     Therapy Frequency (PT) daily  -TW     Predicted Duration of Therapy Intervention (PT) 1 wk  -TW     Row Name 08/06/22 1020          Vital Signs    Pre SpO2 (%) 96  -TW     O2 Delivery Pre Treatment room air  -TW     Post SpO2 (%) 94  -TW     O2 Delivery Post Treatment room air  -TW     Pre Patient Position Supine  -TW     Intra Patient Position Standing  -TW     Post Patient Position Sitting  -TW     Row Name 08/06/22 1020          Positioning and Restraints    Pre-Treatment Position in bed  -TW     Post Treatment Position chair  -TW     In Chair notified nsg;sitting;call light within reach;encouraged to call for assist  -TW           User Key  (r) = Recorded By, (t) = Taken By, (c) = Cosigned By    Initials Name Provider Type    TW Dilia Dubose, PT Physical Therapist               Outcome Measures     Row Name 08/06/22 1020          How much help from another person do you currently need...    Turning from your back to your side while in flat bed without using bedrails? 4  -TW     Moving from lying on back to sitting on the side of a flat bed without bedrails? 3  -TW     Moving to and from a bed to a chair (including a wheelchair)? 3  -TW     Standing up from a chair using your arms (e.g., wheelchair, bedside chair)? 3  -TW     Climbing 3-5 steps with a railing? 2  -TW     To walk in hospital room? 3  -TW     AM-PAC 6 Clicks Score (PT) 18  -TW     Highest level of mobility 6 --> Walked 10 steps or more  -TW     Row Name 08/06/22 1020          Functional Assessment    Outcome Measure Options AM-PAC 6 Clicks Basic Mobility (PT)  " -TW           User Key  (r) = Recorded By, (t) = Taken By, (c) = Cosigned By    Initials Name Provider Type    TW Dilia Dubose PT Physical Therapist                             Physical Therapy Education                 Title: PT OT SLP Therapies (In Progress)     Topic: Physical Therapy (In Progress)     Point: Mobility training (Done)     Learning Progress Summary           Patient Acceptance, E,D, VU by TW at 8/6/2022 1020    Comment: Pt education on calling for assist prior to getting up as well as using rolling walker at present for all mobility to ensure safe functional mobility                   Point: Home exercise program (Not Started)     Learner Progress:  Not documented in this visit.          Point: Body mechanics (Not Started)     Learner Progress:  Not documented in this visit.          Point: Precautions (Done)     Learning Progress Summary           Patient Acceptance, E,D, VU by TW at 8/6/2022 1020    Comment: Pt education on calling for assist prior to getting up as well as using rolling walker at present for all mobility to ensure safe functional mobility                               User Key     Initials Effective Dates Name Provider Type Discipline     06/16/21 -  Dilia Dubose PT Physical Therapist PT              PT Recommendation and Plan  Planned Therapy Interventions (PT): balance training, bed mobility training, gait training, home exercise program, patient/family education, transfer training, strengthening  Plan of Care Reviewed With: patient  Outcome Evaluation: PT evaluation completed this am with pt oriented x 4 and cooperative throughout. Pt presented with HA rated 4/10 supine in bed. Pt was able to come to EOB and get her shoes on in sitting with modified [place]. Pt did need CGA when standing without device with pt c/o \"stiffness\" in RLE due to her RA. Rolling walker then used for ambulation 100 ft then 40 ft with CGA. Sit to stand from toilet using grab bar with " modified independence. BLE strength equal B but decreased strenght in B hips noted. In addition to deficts in strength, pt presented with deficits in endurance, and balance. Pt is expected to improve her functional mobility with continued PT services prior to d/c.     Time Calculation:    PT Charges     Row Name 08/06/22 1020             Time Calculation    Stop Time 1020  -TW      PT Received On 08/06/22 -TW      PT Goal Re-Cert Due Date 08/13/22  -TW            User Key  (r) = Recorded By, (t) = Taken By, (c) = Cosigned By    Initials Name Provider Type    TW Dilia Dubose PT Physical Therapist              Therapy Charges for Today     Code Description Service Date Service Provider Modifiers Qty    97886687294 HC PT EVAL LOW COMPLEXITY 3 8/6/2022 Dilia Dubose PT GP 1          PT G-Codes  Outcome Measure Options: AM-PAC 6 Clicks Basic Mobility (PT)  AM-PAC 6 Clicks Score (PT): 18    Dilia Dubose PT  8/6/2022

## 2022-08-06 NOTE — PHARMACY RECOMMENDATION
"Pharmacy Consult - Enoxaparin Dosing  Pharmacy was consulted to dose enoxaparin for  Marisol Muro, a 77 y.o. female  175.3 cm (69\") 108 kg (238 lb 12.1 oz)    Indication: VTE prophylaxis    Allergies  Latex, Tramadol, and Wound dressing adhesive    Relevant clinical data and objective history reviewed:   [Ht: 175.3 cm (69\"); Wt: 108 kg (238 lb 12.1 oz)]  Body mass index is 35.26 kg/m².  Estimated Creatinine Clearance: 91.8 mL/min (by C-G formula based on SCr of 0.65 mg/dL).  Results from last 7 days   Lab Units 08/05/22  1603   HEMOGLOBIN g/dL 12.8   HEMATOCRIT % 38.7   PLATELETS 10*3/mm3 204   CREATININE mg/dL 0.65       Asessment/Plan  Initiate enoxaparin 60 mg SQ every 24 hours  Pharmacy will monitor renal function and clinical status and adjust the dose and/or frequency as needed.    Thanks,   Lisseth Mejias, PharmD, BCPS  8/6/2022  13:04 EDT    "

## 2022-08-06 NOTE — CASE MANAGEMENT/SOCIAL WORK
Discharge Planning Assessment  [facility]     Patient Name: Marisol Mariscal  MRN: [id number]  Today's Date: 8/6/2022    Admit Date: 8/5/2022     Discharge Needs Assessment     Row Name 08/06/22 1750       Living Environment    People in Home spouse    Name(s) of People in Home Ashu Mariscal, spouse    Current Living Arrangements home    Duration at Residence 40 years    Primary Care Provided by self    Provides Primary Care For no one    Caregiving Concerns not at present but pt states her daughterChhaya  could help her if needed    Family Caregiver if Needed child(celina), adult    Family Caregiver Names Chhaya, kathryn    Quality of Family Relationships involved;supportive    Able to Return to Prior Arrangements yes       Resource/Environmental Concerns    Resource/Environmental Concerns none       Transition Planning    Patient/Family Anticipates Transition to home    Patient/Family Anticipated Services at Transition home health care    Transportation Anticipated family or friend will provide       Discharge Needs Assessment    Readmission Within the Last 30 Days no previous admission in last 30 days    Equipment Currently Used at Home none  Pt has a rolling walker and BSC from past surgeries but does not use them    Concerns to be Addressed discharge planning    Anticipated Changes Related to Illness none    Equipment Needed After Discharge none               Discharge Plan     Row Name 08/06/22 1757       Plan    Plan Spoke with pt about DCP  Confirmed current address and phone contacts, Ashu Mariscal, spouse [phone] and Yamil Mariscal, son [phone] who lives in [place] and Chhaya, daughter who lives in [place]  No POA or LW  PCP Mati Samson MD  DME BSC, rolling walker and cane   Pt states she has used [employer] in the past and would use them again   We discussed HH and I stated she would benefit from PT/OT and pt is receptive  Will continue to assess pt for any further needs prior to  being discharged home  Pt's daughter Chhaya will provide transportation home  Addendum  Pt stated she would like HH services  Dr Peña notified of plan  Faxed HH orders, face sheet and PT/OT notes with success  CM to follow up Monday               Continued Care and Services - Admitted Since 8/5/2022    Coordination has not been started for this encounter.       Expected Discharge Date and Time     Expected Discharge Date Expected Discharge Time    Aug 7, 2022          Demographic Summary     Row Name 08/06/22 174       General Information    Admission Type observation    Arrived From emergency department    Required Notices Provided Observation Status Notice    Referral Source admission list    Reason for Consult discharge planning    Preferred Language English       Contact Information    Permission Granted to Share Info With     Contact Information Obtained for                Functional Status     Row Name 08/06/22 1749       Functional Status    Usual Activity Tolerance good    Functional Status Comments Pt states prior to this admission she was not using any DME and able to do all ADL good  She is now using a rolling walker for her weakness       Functional Status, IADL    Medications independent    Meal Preparation independent    Housekeeping independent    Laundry independent    Shopping independent       Employment/    Employment Status unemployed               Psychosocial    No documentation.                Abuse/Neglect    No documentation.                Legal    No documentation.                Substance Abuse    No documentation.                Patient Forms    No documentation.                   Ina Wick RN

## 2022-08-06 NOTE — PROGRESS NOTES
[facility]    Name:  Marisol Muro   Age:  77 y.o.  Sex:  female  :  1945  MRN:  [id number]   Visit Number:  [id number]  Admission Date:  2022  Date Of Service:  22  Primary Care Physician:  Mati Samson MD     LOS: 0 days :    Chief Complaint:      Generalized weakness.    Subjective:    Ms. Muro was seen and examined this morning.  She is currently getting up and walking with the physical therapist using a walker.  She was able to do fairly well.  She does have rheumatoid arthritis and has had chronic left hip pain.  She follows up with rheumatology in [place].  Denies any chest pain.  She apparently had some slurring of speech yesterday but currently speaking normally.  Denies any chest pain or shortness of breath.  She lives with her  and is usually independent in daily activities.  Previous physician documentation, laboratory and imaging data have been reviewed.    Hospital Course:    Ms. Muro is a 77-year-old female with medical history of hypertension, RA, GERD, who presented from home due to complaints of weakness, especially in her lower extremities associated with some slurring of speech that lasted momentarily.  In the ER, patient's vital signs were hemodynamically stable on room air.  An EKG with no ischemic changes and with a sinus rhythm noted.  CT head without contrast was unremarkable for acute abnormality with moderate atrophy noted.  CT angiograms of the head and neck performed with no large vessel occlusions.  Chest x-ray was unremarkable.  CMP within normal limits, negative troponin, CBC unremarkable.  Urinalysis unremarkable.  TSH unremarkable.  Magnesium unremarkable.    Patient was admitted to the medical floor with telemetry for observation.  She was evaluated by Dr. Mendoza from telemetry neurology who felt that the patient may have underlying TIA and recommended continuation of low-dose aspirin therapy.   He also recommended MRI of the brain as well as 2D echocardiogram.  Patient was started on physical and occupational therapy.    Review of Systems:     All systems were reviewed and negative except as mentioned in subjective, assessment and plan.    Vital Signs:    Temp:  [97.5 °F (36.4 °C)-98.2 °F (36.8 °C)] 98 °F (36.7 °C)  Heart Rate:  [60-76] 66  Resp:  [18] 18  BP: ()/() 97/71    Intake and output:    I/O last 3 completed shifts:  In: 240 [P.O.:240]  Out: 1300 [Urine:1300]  I/O this shift:  In: 240 [P.O.:240]  Out: 300 [Urine:300]    Physical Examination:    General Appearance:  Alert and cooperative.   Head:  Atraumatic and normocephalic.   Eyes: Conjunctivae and sclerae normal, no icterus. No pallor.   Throat: No oral lesions, no thrush, oral mucosa moist.   Neck: Supple, trachea midline, no thyromegaly.   Lungs:   Breath sounds heard bilaterally equally.  No wheezing or crackles. No Pleural rub or bronchial breathing.   Heart:  Normal S1 and S2, no murmur, no gallop, no rub. No JVD.   Abdomen:   Normal bowel sounds, no masses, no organomegaly. Soft, nontender, nondistended, no rebound tenderness.   Extremities: Supple, no edema, no cyanosis, no clubbing.   Skin: No bleeding or rash.   Neurologic: Alert and oriented x 3. No facial asymmetry. Moves all four limbs. No tremors.  Handgrip strong bilaterally.     Laboratory results:    Results from last 7 days   Lab Units 08/05/22  1603   SODIUM mmol/L 142   POTASSIUM mmol/L 3.8   CHLORIDE mmol/L 104   CO2 mmol/L 30.5*   BUN mg/dL 14   CREATININE mg/dL 0.65   CALCIUM mg/dL 9.8   BILIRUBIN mg/dL 0.3   ALK PHOS U/L 60   ALT (SGPT) U/L 12   AST (SGOT) U/L 18   GLUCOSE mg/dL 111*     Results from last 7 days   Lab Units 08/05/22  1603   WBC 10*3/mm3 6.22   HEMOGLOBIN g/dL 12.8   HEMATOCRIT % 38.7   PLATELETS 10*3/mm3 204         Results from last 7 days   Lab Units 08/05/22  1603   TROPONIN T ng/mL <0.010     I have reviewed the patient's laboratory  results.    Radiology results:    CT Head Without Contrast    Result Date: 8/5/2022  FINAL REPORT CLINICAL HISTORY: weakness FINDINGS: Axial images of the head were obtained without contrast. Coronal reformatted images were also obtained.This study was performed with techniques to keep radiation doses as low as reasonably achievable (ALARA). Individualized dose reduction techniques using automated exposure control or adjustment of mA and/or kV according to the patient's size were employed.  There is moderate diffuse atrophy greatest in the left frontal lobe. There are mild changes of chronic microvascular ischemia.  There is no evidence of intracranial hemorrhage or mass. The ventricular size is within normal limits. There is no evidence of shift of the midline structures. No abnormal extra axial fluid collection is identified. No skull abnormality is seen on the bone window images.     Impression: Moderate atrophy with mild changes of chronic microvascular ischemia.  No acute intracranial abnormality. Authenticated and Electronically Signed by Fadi Slade III, MD on 08/05/2022 04:57:29 PM    CT Angiogram Neck    Result Date: 8/5/2022  FINAL REPORT TECHNIQUE: Axial images through the neck were obtained following IV contrast administration. CLINICAL HISTORY: Stroke, follow up FINDINGS: The aortic arch and the carotid arteries are normal as are the bilateral vertebral arteries.     Impression: Unremarkable. Authenticated and Electronically Signed by Valente Watson M.D. on 08/05/2022 09:37:58 PM    XR Chest 1 View    Result Date: 8/5/2022  PROCEDURE: XR CHEST 1 VW-  HISTORY: Chest pain protocol  COMPARISON:  None  FINDINGS:  Portable view of the chest demonstrates the lungs to be grossly clear. There is no evidence of effusion, pneumothorax or other significant pleural disease. The mediastinum is unremarkable.  The heart size is normal.      Impression: Unremarkable portable chest.  This report was signed and  finalized on 8/5/2022 4:20 PM by Damion Harvey MD.    CT Angiogram Head    Result Date: 8/5/2022  FINAL REPORT CLINICAL HISTORY: Stroke, follow up FINDINGS: The intracranial portions of the internal carotid arteries are unremarkable. The anterior and middle cerebral arteries are unremarkable. The vertebral and basilar arteries are within normal limits.     Impression: No acute arterial abnormality. Authenticated and Electronically Signed by Valente Watson M.D. on 08/05/2022 09:37:55 PM    I have reviewed the patient's radiology reports.    Medication Review:     I have reviewed the patient's active and prn medications.     Problem List:      TIA (transient ischemic attack)    Rheumatoid arthritis (HCC)    Essential hypertension    Assessment:    1. Generalized weakness with possible TIA, POA.  2. Rheumatoid arthritis on hydroxychloroquine and methotrexate.  3. Essential hypertension.    Plan:    Generalized weakness/suspected TIA.  - Initial CT of the head and CT angiogram of the neck were within normal limits.  - Patient was seen by Dr. Mendoza from telemetry neurology and have discussed the patient's treatment plan with him.  - We will continue low-dose aspirin therapy at this time.  - We will obtain MRI of the brain as well as 2D echocardiogram.    Essential hypertension/rheumatoid arthritis.  - Continue home medications including hydrochlorothiazide, hydroxychloroquine.    Discussed with physical therapy at the bedside.    I discussed the patient's condition and treatment plan including possible discharge with home health tomorrow, with her  Ashu over the phone today.    DVT Prophylaxis: Lovenox  Code Status: Full  Diet: Cardiac  Discharge Plan: Home with home health tomorrow.    Tylor Peña MD  08/06/22  12:56 EDT    Dictated utilizing Dragon dictation.

## 2022-08-06 NOTE — CONSULTS
Stroke Consult Note    Patient Name: Marisol Muro   MRN: [id number]  Age: 77 y.o.  Sex: female  : 1945    Primary Care Physician: Mati Samson MD  Referring Physician:  No ref. provider found        Handedness: Right  Race: White    Chief Complaint/Reason for Consultation: Imbalance walking and slurred speech    Subjective .  HPI: 77-year-old right-handed white female with known diagnosis of hypertension, rheumatoid arthritis, GERD, who had acute onset of difficulty walking while she was at a salon.  This was associated with slurred speech as if Novocain has been injected.  Denies any upper extremity symptoms, denies any vision changes, facial droop or trouble swallowing.  She has been having headache, mild and dull, and top of the head, since last week or so.  About 4/10 in intensity, not associated with any nausea/vomiting or light sensitivity.  Patient does feel better than yesterday.  No history of stroke/MI.    Last Known Normal Date/Time: Around 4 PM EST     Review of Systems   Constitutional: Negative.    Eyes: Negative.    Respiratory: Negative.    Cardiovascular: Negative.    Gastrointestinal: Negative.    Endocrine: Negative.    Genitourinary: Negative.    Musculoskeletal: Positive for arthralgias.   Skin: Negative.    Allergic/Immunologic: Negative.    Neurological: Positive for headaches.   Psychiatric/Behavioral: Negative.       Past Medical History:   Diagnosis Date   • Asymptomatic bacteriuria    • COVID-19 vaccine series completed     Moderna   • Ear piercing    • Esophageal stricture    • GERD (gastroesophageal reflux disease)    • History of prolapse of bladder    • HL (hearing loss)     no hearing aids   • Hypertension    • Impaired fasting glucose    • Obesity    • Osteoarthritis    • Rheumatoid arthritis (HCC)    • Skin cancer     left ankle   • Wears glasses      Past Surgical History:   Procedure Laterality Date   • COLONOSCOPY     • ENDOSCOPY     • ESOPHAGEAL DILATATION     •  GROIN HIDRADENITIS EXCISION Right 10/29/2021    Procedure: GROIN HIDRADENITIS EXCISION with excision of right leg mass;  Surgeon: Tanmay Clements MD;  Location: [facility];  Service: General;  Laterality: Right;   • JOINT REPLACEMENT     • LAPAROSCOPIC TUBAL LIGATION     • REPLACEMENT TOTAL KNEE BILATERAL      ,    • SKIN BIOPSY     • SKIN CANCER EXCISION     • TOTAL HIP ARTHROPLASTY  2014    right     Family History   Problem Relation Age of Onset   • Stroke Mother    • Arthritis Mother    • Heart disease Mother    • Lung cancer Father    • Cancer Father    • Hyperlipidemia Sister      Social History     Socioeconomic History   • Marital status:      Spouse name: Ashu   • Number of children: 2   Tobacco Use   • Smoking status: Former Smoker     Packs/day: 1.00     Years: 20.00     Pack years: 20.00     Quit date: 1983     Years since quittin.6   • Smokeless tobacco: Never Used   Vaping Use   • Vaping Use: Never used   Substance and Sexual Activity   • Alcohol use: Yes     Comment: rare    • Drug use: No   • Sexual activity: Defer     Allergies   Allergen Reactions   • Latex Rash   • Tramadol Nausea Only and Other (See Comments)     GI upset   • Wound Dressing Adhesive Other (See Comments)     slight redness at tape site     Prior to Admission medications    Medication Sig Start Date End Date Taking? Authorizing Provider   acetaminophen (TYLENOL) 650 MG 8 hr tablet Take 650 mg by mouth 2 (Two) Times a Day.   Yes ProviderEri MD   Cholecalciferol 50 MCG ( UT) capsule Take 2,000 Units by mouth.   Yes ProviderEri MD   estradiol (ESTRACE) 0.1 MG/GM vaginal cream 0.5  grams intravaginal 2x/week 21  Yes Rafia Owens MD   folic acid (FOLVITE) 1 MG tablet Take 1 mg by mouth Daily. 3/1/18  Yes Walt Diamond MD   hydroCHLOROthiazide (HYDRODIURIL) 12.5 MG tablet Take 1 tablet by mouth Daily. 22  Yes Mati Samson MD   hydroxychloroquine (PLAQUENIL) 200 MG tablet  200 mg 2 (Two) Times a Day. 3/28/18  Yes Walt Diamond MD   methotrexate 2.5 MG tablet Take 15 mg by mouth 1 (One) Time Per Week. On Sunday   PT TAKES 7.5MG IN MORNING AND .7.5MG IN EVENING ON SUNDAYS 8/6/21  Yes Eri Darden MD   omeprazole (priLOSEC) 20 MG capsule TAKE 1 CAPSULE BY MOUTH EVERY DAY 9/23/21  Yes Mait Samson MD   acyclovir (ZOVIRAX) 400 MG tablet Take 1 tablet by mouth Every 4 (Four) Hours While Awake. Take no more than 5 doses a day.  Patient taking differently: Take 400 mg by mouth Daily As Needed. Take no more than 5 doses a day. 10/21/19   Mati Samson MD   Cetirizine HCl (ZyrTEC Allergy) 10 MG capsule Take 1 tablet by mouth Daily As Needed.  Patient not taking: Reported on 8/5/2022    Eri Darden MD   Diclofenac Sodium (VOLTAREN) 1 % gel gel Apply 2 g topically to the appropriate area as directed Every 12 (Twelve) Hours. 4/11/22   Eri Darden MD   HYDROcodone-acetaminophen (NORCO) 5-325 MG per tablet Take 1-2 tablets by mouth Every 4 (Four) Hours As Needed (Pain). 10/29/21   Tanmay Clements MD             Objective     Temp:  [97.5 °F (36.4 °C)-98.2 °F (36.8 °C)] 98.2 °F (36.8 °C)  Heart Rate:  [60-76] 70  Resp:  [18] 18  BP: (105-144)/() 105/72  Neurological Exam  Mental Status  Awake, alert and oriented to person, place and time. Speech is normal. Language is fluent with no aphasia. Attention and concentration are normal. Fund of knowledge is appropriate for level of education.    Cranial Nerves  CN II: Visual fields full to confrontation.  CN III, IV, VI: Extraocular movements intact bilaterally. Normal lids and orbits bilaterally. Pupils equal round and reactive to light bilaterally.  CN V: Facial sensation is normal.  CN VII: Full and symmetric facial movement.  CN VIII: Equal hearing bilaterally.  CN IX, X: Palate elevates symmetrically  CN XI: Shoulder shrug strength is normal.  CN XII: Tongue midline without atrophy or  fasciculations.    Motor  Normal muscle bulk throughout. No fasciculations present. Strength is 5/5 throughout all four extremities.    Sensory  Light touch is normal in upper and lower extremities.     Reflexes   not assessed    Coordination  No dysmetria.    Gait  She required some help and had some broad-based gait.      Physical Exam  Vitals and nursing note reviewed.   Constitutional:       Appearance: Normal appearance.   HENT:      Head: Normocephalic and atraumatic.      Mouth/Throat:      Mouth: Mucous membranes are moist.      Pharynx: Oropharynx is clear.   Eyes:      Conjunctiva/sclera: Conjunctivae normal.      Pupils: Pupils are equal, round, and reactive to light.   Cardiovascular:      Rate and Rhythm: Normal rate and regular rhythm.   Pulmonary:      Effort: Pulmonary effort is normal. No respiratory distress.   Musculoskeletal:      Cervical back: Normal range of motion and neck supple.   Neurological:      Mental Status: She is alert.   Psychiatric:         Mood and Affect: Mood normal.         Behavior: Behavior normal.         Acute Stroke Data    IV Thrombolytic (TPA/Tenecteplase) Inclusion / Exclusion Criteria    Time: 11:20 EDT  Person Administering Scale: Spencer Mendoza MD    Inclusion Criteria  [x]   18 years of age or greater   []   Onset of symptoms < 4.5 hours before beginning treatment (stroke onset = time patient was last seen well or without symptoms).   [x]   Diagnosis of acute ischemic stroke causing measurable disabling deficit (Complete Hemianopia, Any Aphasia, Visual or Sensory Extinction, Any weakness limiting sustained effort against gravity)   []   Any remaining deficit considered potentially disabling in view of patient and practitioner   Exclusion criteria (Do not proceed with Alteplase if any are checked under exclusion criteria)  []   Onset unknown or GREATER than 4.5 hours   []   ICH on CT/MRI   []   CT demonstrates hypodensity representing acute or subacute infarct   []    Significant head trauma or prior stroke in the previous 3 months   []   Symptoms suggestive of subarachnoid hemorrhage   []   History of un-ruptured intracranial aneurysm GREATER than 10 mm   []   Recent intracranial or intraspinal surgery within the last 3 months   []   Arterial puncture at a non-compressible site in the previous 7 days   []   Active internal bleeding   []   Acute bleeding tendency   []   Platelet count LESS than 100,000 for known hematological diseases such as leukemia, thrombocytopenia or chronic cirrhosis   []   Current use of anticoagulant with INR GREATER than 1.7 or PT GREATER than 15 seconds, aPTT GREATER than 40 seconds   []   Heparin received within 48 hours, resulting in abnormally elevated aPTT GREATER than upper limit of normal   []   Current use of direct thrombin inhibitors or direct factor Xa inhibitors in the past 48 hours   []   Elevated blood pressure refractory to treatment (systolic GREATER than 185 mm/Hg or diastolic  GREATER than 110 mm/Hg   []   Suspected infective endocarditis and aortic arch dissection   []   Current use of therapeutic treatment dose of low-molecular-weight heparin (LMWH) within the previous 24 hours   []   Structural GI malignancy or bleed   Relative exclusion for all patients  [x]   Only minor nondisabling symptoms   []   Pregnancy   []   Seizure at onset with postictal residual neurological impairments   []   Major surgery or previous trauma within past 14 days   []   History of previous spontaneous ICH, intracranial neoplasm, or AV malformation   []   Postpartum (within previous 14 days)   []   Recent GI or urinary tract hemorrhage (within previous 21 days)   []   Recent acute MI (within previous 3 months)   []   History of unruptured intracranial aneurysm LESS than 10 mm   []   History of ruptured intracranial aneurysm   []   Blood glucose LESS than 50 mg/dL (2.7 mmol/L)   []   Dural puncture within the last 7 days   []   Known GREATER than 10  cerebral microbleeds   Additional exclusions for patients with symptoms onset between 3 and 4.5 hours.  []   Age > 80.   []   On any anticoagulants regardless of INR  >>> Warfarin (Coumadin), Heparin, Enoxaparin (Lovenox), fondaparinux (Arixtra), bivalirudin (Angiomax), Argatroban, dabigatran (Pradaxa), rivaroxaban (Xarelto), or apixaban (Eliquis)   []   Severe stroke (NIHSS > 25).   []   History of BOTH diabetes and previous ischemic stroke.   []   The risks and benefits have been discussed with the patient or family related to the administration of IV alteplase for stroke symptoms.   []   I have discussed and reviewed the patient's case and imaging with the attending prior to IV Thrombolytic (TPA/Tenecteplase).    Time Thrombolytic administered       Hospital Meds:  Scheduled- aspirin, 81 mg, Oral, Daily  atorvastatin, 80 mg, Oral, Nightly  folic acid, 1 mg, Oral, Daily  hydroCHLOROthiazide, 12.5 mg, Oral, Daily  hydroxychloroquine, 200 mg, Oral, Q12H  pantoprazole, 40 mg, Oral, QAM  sodium chloride, 10 mL, Intravenous, Q12H      Infusions-     PRNs- •  acetaminophen  •  HYDROcodone-acetaminophen  •  sodium chloride  •  sodium chloride  •  sodium chloride    Functional Status Prior to Current Stroke/Morris Score: 0    NIH Stroke Scale  Time: 11:20 EDT  Person Administering Scale: Spencer Mendoza MD    1a  Level of consciousness: 0=alert; keenly responsive   1b. LOC questions:  0=Performs both tasks correctly   1c. LOC commands: 0=Performs both tasks correctly   2.  Best Gaze: 0=normal   3.  Visual: 0=No visual loss   4. Facial Palsy: 0=Normal symmetric movement   5a.  Motor left arm: 0=No drift, limb holds 90 (or 45) degrees for full 10 seconds   5b.  Motor right arm: 0=No drift, limb holds 90 (or 45) degrees for full 10 seconds   6a. motor left le=No drift, limb holds 90 (or 45) degrees for full 10 seconds   6b  Motor right le=No drift, limb holds 90 (or 45) degrees for full 10 seconds   7. Limb Ataxia:  0=Absent   8.  Sensory: 0=Normal; no sensory loss   9. Best Language:  0=No aphasia, normal   10. Dysarthria: 0=Normal   11. Extinction and Inattention: 0=No abnormality    Total:   0       Results Reviewed:  I have personally reviewed current lab, radiology, and data   CT head shows no acute changes, diffuse atrophy of the brain, especially in the frontal region.  Large bilateral central sulcus.  CT angiogram of head and neck shows no significant stenosis or occlusion.  Her A1c is 5.5, LDL 77, total cholesterol 171 and triglyceride of 53.              Assessment/Plan:      1. Transient ischemic attack.  Patient did have symptoms of imbalance walking and slurred speech, which is likely central in cause.  Part of her imbalance walking could also be secondary to her arthritic pain.  Her CT head/CT angiogram looks okay.  Recommend to get MRI brain without contrast.  Continue her on aspirin 81 mg and Lipitor 40 mg daily for secondary stroke prevention.  Follow-up on 2D echocardiogram.  2. Essential hypertension.  Normal blood pressure goals for her.  No need for permissive hypertension.  3. PT/OT evaluation.    Case was discussed with patient, nursing and the hospitalist.  She will get MRI brain tomorrow morning.  Plan to discharge her tomorrow after MRI is done.  Thank you for the consult.          Spencer Mendoza MD  August 6, 2022  11:20 EDT    Verbal consent taken.  Patient agreeable to be seen via telemedicine.    This was an audio and video enabled telemedicine encounter.

## 2022-08-06 NOTE — PLAN OF CARE
Goal Outcome Evaluation:  Plan of Care Reviewed With: patient        Progress: no change  Outcome Evaluation: VSS,  NIHSS =0 ON ADMISSION,   PASSES DYSPHAGIA SCREENING TEST.   CARE GIVEN AS ORDERED.

## 2022-08-06 NOTE — PLAN OF CARE
Goal Outcome Evaluation:           Problem: Adult Inpatient Plan of Care  Goal: Plan of Care Review  Outcome: Ongoing, Progressing         VSS. PRN tylenol administered for headache. Ambulated to bathroom with walker and standby-assist. No acute events.

## 2022-08-07 ENCOUNTER — APPOINTMENT (OUTPATIENT)
Dept: CARDIOLOGY | Facility: HOSPITAL | Age: 77
End: 2022-08-07

## 2022-08-07 ENCOUNTER — APPOINTMENT (OUTPATIENT)
Dept: MRI IMAGING | Facility: HOSPITAL | Age: 77
End: 2022-08-07

## 2022-08-07 ENCOUNTER — READMISSION MANAGEMENT (OUTPATIENT)
Dept: CALL CENTER | Facility: HOSPITAL | Age: 77
End: 2022-08-07

## 2022-08-07 VITALS
BODY MASS INDEX: 33.63 KG/M2 | TEMPERATURE: 97.7 F | HEART RATE: 67 BPM | RESPIRATION RATE: 18 BRPM | DIASTOLIC BLOOD PRESSURE: 64 MMHG | OXYGEN SATURATION: 97 % | SYSTOLIC BLOOD PRESSURE: 130 MMHG | WEIGHT: 227.07 LBS | HEIGHT: 69 IN

## 2022-08-07 LAB
ANION GAP SERPL CALCULATED.3IONS-SCNC: 7.2 MMOL/L (ref 5–15)
BUN SERPL-MCNC: 12 MG/DL (ref 8–23)
BUN/CREAT SERPL: 16.2 (ref 7–25)
CALCIUM SPEC-SCNC: 9.6 MG/DL (ref 8.6–10.5)
CHLORIDE SERPL-SCNC: 101 MMOL/L (ref 98–107)
CO2 SERPL-SCNC: 28.8 MMOL/L (ref 22–29)
CREAT SERPL-MCNC: 0.74 MG/DL (ref 0.57–1)
DEPRECATED RDW RBC AUTO: 48.6 FL (ref 37–54)
EGFRCR SERPLBLD CKD-EPI 2021: 82.4 ML/MIN/1.73
ERYTHROCYTE [DISTWIDTH] IN BLOOD BY AUTOMATED COUNT: 14.6 % (ref 12.3–15.4)
GLUCOSE SERPL-MCNC: 101 MG/DL (ref 65–99)
HCT VFR BLD AUTO: 38 % (ref 34–46.6)
HGB BLD-MCNC: 12.7 G/DL (ref 12–15.9)
MCH RBC QN AUTO: 31.1 PG (ref 26.6–33)
MCHC RBC AUTO-ENTMCNC: 33.4 G/DL (ref 31.5–35.7)
MCV RBC AUTO: 92.9 FL (ref 79–97)
PLATELET # BLD AUTO: 188 10*3/MM3 (ref 140–450)
PMV BLD AUTO: 10.4 FL (ref 6–12)
POTASSIUM SERPL-SCNC: 3.6 MMOL/L (ref 3.5–5.2)
RBC # BLD AUTO: 4.09 10*6/MM3 (ref 3.77–5.28)
SODIUM SERPL-SCNC: 137 MMOL/L (ref 136–145)
WBC NRBC COR # BLD: 6.46 10*3/MM3 (ref 3.4–10.8)

## 2022-08-07 PROCEDURE — G0378 HOSPITAL OBSERVATION PER HR: HCPCS

## 2022-08-07 PROCEDURE — 93306 TTE W/DOPPLER COMPLETE: CPT

## 2022-08-07 PROCEDURE — 99214 OFFICE O/P EST MOD 30 MIN: CPT | Performed by: PSYCHIATRY & NEUROLOGY

## 2022-08-07 PROCEDURE — 70551 MRI BRAIN STEM W/O DYE: CPT

## 2022-08-07 PROCEDURE — 96372 THER/PROPH/DIAG INJ SC/IM: CPT

## 2022-08-07 PROCEDURE — 93306 TTE W/DOPPLER COMPLETE: CPT | Performed by: INTERNAL MEDICINE

## 2022-08-07 PROCEDURE — 99217 PR OBSERVATION CARE DISCHARGE MANAGEMENT: CPT | Performed by: INTERNAL MEDICINE

## 2022-08-07 PROCEDURE — 85027 COMPLETE CBC AUTOMATED: CPT | Performed by: INTERNAL MEDICINE

## 2022-08-07 PROCEDURE — 25010000002 ENOXAPARIN PER 10 MG: Performed by: INTERNAL MEDICINE

## 2022-08-07 PROCEDURE — 80048 BASIC METABOLIC PNL TOTAL CA: CPT | Performed by: INTERNAL MEDICINE

## 2022-08-07 RX ORDER — DIVALPROEX SODIUM 250 MG/1
250 TABLET, DELAYED RELEASE ORAL EVERY 8 HOURS SCHEDULED
Status: DISCONTINUED | OUTPATIENT
Start: 2022-08-07 | End: 2022-08-07 | Stop reason: HOSPADM

## 2022-08-07 RX ORDER — DIVALPROEX SODIUM 250 MG/1
250 TABLET, DELAYED RELEASE ORAL EVERY 8 HOURS SCHEDULED
Qty: 90 TABLET | Refills: 0 | Status: SHIPPED | OUTPATIENT
Start: 2022-08-07 | End: 2022-08-07 | Stop reason: SDUPTHER

## 2022-08-07 RX ORDER — SENNOSIDES 8.6 MG
650 CAPSULE ORAL 2 TIMES DAILY PRN
Start: 2022-08-07

## 2022-08-07 RX ORDER — ATORVASTATIN CALCIUM 20 MG/1
20 TABLET, FILM COATED ORAL DAILY
Qty: 30 TABLET | Refills: 0 | Status: SHIPPED | OUTPATIENT
Start: 2022-08-07 | End: 2022-08-07 | Stop reason: SDUPTHER

## 2022-08-07 RX ORDER — ASPIRIN 81 MG/1
81 TABLET ORAL DAILY
Qty: 30 TABLET | Refills: 0 | Status: SHIPPED | OUTPATIENT
Start: 2022-08-08

## 2022-08-07 RX ORDER — ATORVASTATIN CALCIUM 40 MG/1
40 TABLET, FILM COATED ORAL NIGHTLY
Qty: 30 TABLET | Refills: 0 | Status: SHIPPED | OUTPATIENT
Start: 2022-08-07 | End: 2022-08-22 | Stop reason: SDUPTHER

## 2022-08-07 RX ORDER — DIVALPROEX SODIUM 250 MG/1
250 TABLET, DELAYED RELEASE ORAL 2 TIMES DAILY
Qty: 60 TABLET | Refills: 0 | Status: SHIPPED | OUTPATIENT
Start: 2022-08-07 | End: 2022-08-15

## 2022-08-07 RX ADMIN — HYDROXYCHLOROQUINE SULFATE 200 MG: 200 TABLET ORAL at 09:01

## 2022-08-07 RX ADMIN — ACETAMINOPHEN 650 MG: 325 TABLET ORAL at 09:15

## 2022-08-07 RX ADMIN — ENOXAPARIN SODIUM 60 MG: 60 INJECTION SUBCUTANEOUS at 09:01

## 2022-08-07 RX ADMIN — FOLIC ACID 1 MG: 1 TABLET ORAL at 09:01

## 2022-08-07 RX ADMIN — HYDROCHLOROTHIAZIDE 12.5 MG: 12.5 CAPSULE ORAL at 09:01

## 2022-08-07 RX ADMIN — ASPIRIN 81 MG: 81 TABLET, COATED ORAL at 09:01

## 2022-08-07 RX ADMIN — PANTOPRAZOLE SODIUM 40 MG: 40 TABLET, DELAYED RELEASE ORAL at 06:04

## 2022-08-07 NOTE — PLAN OF CARE
Goal Outcome Evaluation:  Plan of Care Reviewed With: patient   Discharged home with family.

## 2022-08-07 NOTE — PLAN OF CARE
Goal Outcome Evaluation:  Plan of Care Reviewed With: patient   Score of 0 on stroke scale.  Seen via Zoom by neurology.  No deficits noted.

## 2022-08-07 NOTE — CASE MANAGEMENT/SOCIAL WORK
Case Management Discharge Note    Pt discharged home with family providing transportation with [employer] (pending)            Selected Continued Care - Discharged on 8/7/2022 Admission date: 8/5/2022 - Discharge disposition: Home or Self Care            Transportation Services  Private: Car    Final Discharge Disposition Code: 06 - home with home health care

## 2022-08-07 NOTE — PROGRESS NOTES
Stroke Progress Note       Chief Complaint: Imbalance walking and slurred speech    Subjective    Subjective     Subjective:  No acute issues overnight.  Patient continues to have headache on top of her head, about 4/10 in intensity.  Denies any nausea/vomiting.    Review of Systems   Constitutional: Negative.    Eyes: Negative.    Respiratory: Negative.    Cardiovascular: Negative.    Gastrointestinal: Negative.    Endocrine: Negative.    Genitourinary: Negative.    Musculoskeletal: Positive for arthralgias.   Skin: Negative.    Allergic/Immunologic: Negative.    Neurological: Positive for headaches.   Psychiatric/Behavioral: Negative.          Objective    Objective      Temp:  [97.5 °F (36.4 °C)-98.7 °F (37.1 °C)] 97.7 °F (36.5 °C)  Heart Rate:  [62-75] 67  Resp:  [17-18] 18  BP: ()/(40-70) 130/64    Neurological Exam  Mental Status  Awake, alert and oriented to person, place and time. Speech is normal. Language is fluent with no aphasia. Attention and concentration are normal. Fund of knowledge is appropriate for level of education.     Cranial Nerves  CN II: Visual fields full to confrontation.  CN III, IV, VI: Extraocular movements intact bilaterally. Normal lids and orbits bilaterally. Pupils equal round and reactive to light bilaterally.  CN V: Facial sensation is normal.  CN VII: Full and symmetric facial movement.  CN VIII: Equal hearing bilaterally.  CN IX, X: Palate elevates symmetrically  CN XI: Shoulder shrug strength is normal.  CN XII: Tongue midline without atrophy or fasciculations.     Motor  Normal muscle bulk throughout. No fasciculations present. Strength is 5/5 throughout all four extremities.     Sensory  Light touch is normal in upper and lower extremities.      Reflexes   not assessed     Coordination  No dysmetria.     Gait  She required some help and had some broad-based gait.        Physical Exam  Vitals and nursing note reviewed.   Constitutional:       Appearance: Normal  appearance.   HENT:      Head: Normocephalic and atraumatic.      Mouth/Throat:      Mouth: Mucous membranes are moist.      Pharynx: Oropharynx is clear.   Eyes:      Conjunctiva/sclera: Conjunctivae normal.      Pupils: Pupils are equal, round, and reactive to light.   Cardiovascular:      Rate and Rhythm: Normal rate and regular rhythm.   Pulmonary:      Effort: Pulmonary effort is normal. No respiratory distress.   Musculoskeletal:      Cervical back: Normal range of motion and neck supple.   Neurological:      Mental Status: She is alert.   Psychiatric:         Mood and Affect: Mood normal.         Behavior: Behavior normal.     Results Review:    I reviewed the patient's new clinical results.  MRI brain negative for any acute findings.  Mild white matter disease.  No hemorrhage.  Labs were reviewed.              Assessment/Plan     Assessment/Plan:   77-year-old right-handed white female with known diagnosis of hypertension, rheumatoid arthritis, GERD, who had acute onset of difficulty walking while she was at a salon, associated with slurred speech.  Feels back to her baseline.  Continues to have headaches since last 8 to 10 days.      Transient ischemic attack.  Patient with possible TIA.  MRI brain is negative for any acute finding. Continue her on aspirin 81 mg and Lipitor 40 mg daily for secondary stroke prevention.  Follow-up on 2D echocardiogram.  Essential hypertension.  Normal blood pressure goals for her.  No need for permissive hypertension.  Headache, unspecified.  This could be medication overuse headache, she is on Tylenol 3 times a day, Norco twice a day for her rheumatoid arthritis pain.  Encouraged her to cut down on Tylenol, and follow-up with rheumatologist.  We will also start her on Depakote to 50 mg twice daily.  Out of bed as tolerated.     Case was discussed with patient, nursing and the hospitalist.  Okay to discharge her home with outpatient follow-up with PCP and rheumatologist.  Thank you for the consult.          Spencer Mendoza MD  08/07/22  11:41 EDT    This was an audio and video enabled telemedicine encounter.

## 2022-08-07 NOTE — DISCHARGE SUMMARY
[facility]   DISCHARGE SUMMARY      Name:  Marisol Muro   Age:  77 y.o.  Sex:  female  :  1945  MRN:  [id number]   Visit Number:  [id number]    Admission Date:  2022  Date of Discharge:  2022  Primary Care Physician:  Mati Samson MD    Important issues to note:    1.  Patient was mainly treated for TIA-like symptoms with generalized weakness and headache.  MRI did not show any acute abnormalities but showed chronic microvascular disease.  2.  Patient has been started on aspirin 81 mg daily along with Lipitor 40 mg daily.  3.  Patient was also started on Depakote twice daily by neurology for chronic headache.  4.  Patient will benefit from outpatient follow-up with neurology.  5.  Due to low normal blood pressures, her hydrochlorothiazide was discontinued.  6.  Follow-up with primary care physician in 1 week with CMP.  7.  Patient will be arranged home health services.    Discharge Diagnoses:     1. Generalized weakness with suspected TIA, POA.  2. Rheumatoid arthritis on hydroxychloroquine and methotrexate.  3. Essential hypertension-currently low normal blood pressures.    Problem List:     Active Hospital Problems    Diagnosis  POA   • **TIA (transient ischemic attack) [G45.9]  Yes   • Rheumatoid arthritis (HCC) [M06.9]  Yes   • Essential hypertension [I10]  Yes      Resolved Hospital Problems   No resolved problems to display.     Presenting Problem:    Chief Complaint   Patient presents with   • Weakness - Generalized      Consults:     Consulting Physician(s)  Chat With All Active Members    Provider Relationship Specialty    Spencer Mendoza MD  Consulting Physician Neurology        Procedures Performed:    None.    History of presenting illness/Hospital Course:    Ms. Muro is a 77-year-old female with medical history of hypertension, RA, GERD, who presented from home due to complaints of weakness, especially in her lower extremities associated  with some slurring of speech that lasted momentarily.  In the ER, patient's vital signs were hemodynamically stable on room air.  An EKG with no ischemic changes and with a sinus rhythm noted.  CT head without contrast was unremarkable for acute abnormality with moderate atrophy noted.  CT angiograms of the head and neck performed with no large vessel occlusions.  Chest x-ray was unremarkable.  CMP within normal limits, negative troponin, CBC unremarkable.  Urinalysis unremarkable.  TSH unremarkable.  Magnesium unremarkable.     Patient was admitted to the medical floor with telemetry for observation.  She was evaluated by Dr. Mendoza from telemetry neurology who felt that the patient may have underlying TIA and recommended continuation of low-dose aspirin therapy.  He also recommended MRI of the brain as well as 2D echocardiogram.  Patient was started on physical and occupational therapy.  She was able to walk with walker.  She did not have any significant motor weakness.  She did not have any evidence of orthostatic hypotension.    Patient did have MRI on 8/7/2022 and it was unremarkable except for encephalomalacia and chronic microvascular disease.  She has been complaining of chronic headache for the last 2 to 3 weeks.  This is possibly secondary to medication overuse headache as she has been taking Tylenol and Lortab scheduled.  She was seen by Dr. Mendoza from neurology again today and I discussed the patient's discharge plan with him.  He has recommended that she decreased use of Tylenol.  He has started on low-dose Depakote twice daily for headache.  He has recommended to continue Lipitor and aspirin at discharge.    Patient does have low normal blood pressures and we will discontinue her hydrochlorothiazide at discharge.  If her blood pressures start rising up again, her blood pressure medications may be restarted back by her primary care physician.  She will need repeat CMP to look for liver functions especially  since she has been started on Depakote and Lipitor.  I tried calling the patient's daughter Chhaya but unfortunately there was no response.  I subsequently discussed the patient's condition and discharge plan with her  Ashu over the phone.  Patient initially refused home health services but was subsequently agreeable.  She states that she already has a walker at home.    Vital Signs:    Temp:  [97.5 °F (36.4 °C)-98.7 °F (37.1 °C)] 97.7 °F (36.5 °C)  Heart Rate:  [62-75] 67  Resp:  [17-18] 18  BP: ()/(40-70) 130/64    Physical Exam:    General Appearance:  Alert and cooperative.   Head:  Atraumatic and normocephalic.   Eyes: Conjunctivae and sclerae normal, no icterus. No pallor.   Ears:  Ears with no abnormalities noted.   Throat: No oral lesions, no thrush, oral mucosa moist.   Neck: Supple, trachea midline, no thyromegaly.   Back:   No kyphoscoliosis present. No tenderness to palpation.   Lungs:   Breath sounds heard bilaterally equally.  No crackles or wheezing. No Pleural rub or bronchial breathing.   Heart:  Normal S1 and S2, no murmur, no gallop, no rub. No JVD.   Abdomen:   Normal bowel sounds, no masses, no organomegaly. Soft, nontender, nondistended, no rebound tenderness.   Extremities: Supple, 1+ pitting ankle edema, no cyanosis, no clubbing.   Pulses: Pulses palpable bilaterally.   Skin: No bleeding or rash.   Neurologic: Alert and oriented x 3. No facial asymmetry. Moves all four limbs. No tremors.     Pertinent Lab Results:     Results from last 7 days   Lab Units 08/07/22  0513 08/05/22  1603   SODIUM mmol/L 137 142   POTASSIUM mmol/L 3.6 3.8   CHLORIDE mmol/L 101 104   CO2 mmol/L 28.8 30.5*   BUN mg/dL 12 14   CREATININE mg/dL 0.74 0.65   CALCIUM mg/dL 9.6 9.8   BILIRUBIN mg/dL  --  0.3   ALK PHOS U/L  --  60   ALT (SGPT) U/L  --  12   AST (SGOT) U/L  --  18   GLUCOSE mg/dL 101* 111*     Results from last 7 days   Lab Units 08/07/22  0513 08/05/22  1603   WBC 10*3/mm3 6.46 6.22    HEMOGLOBIN g/dL 12.7 12.8   HEMATOCRIT % 38.0 38.7   PLATELETS 10*3/mm3 188 204         Results from last 7 days   Lab Units 08/05/22  1603   TROPONIN T ng/mL <0.010     Pertinent Radiology Results:    Imaging Results (All)     Procedure Component Value Units Date/Time    MRI Brain Without Contrast [[id number]] Collected: 08/07/22 0855     Updated: 08/07/22 0859    Narrative:      PROCEDURE: MRI BRAIN WO CONTRAST-     HISTORY: weakness; G45.9-Transient cerebral ischemic attack, unspecified     PROCEDURE: Multiplanar multisequence imaging of the brain was performed  without the use of intravenous contrast.     COMPARISON: 08/05/2022.     FINDINGS: There is no evidence of Chiari malformation. The corpus  callosum is well-formed. . No expansile sellar mass was appreciated..     Mild periventricular white matter changes. Ventricles are enlarged but  in proportion to degree of atrophy. There is no mass, mass effect or  midline shift. Encephalomalacia involving the left posterior parietal  lobe. There are no areas of restricted diffusion.      The major intracranial vasculature demonstrates the expected flow  related signal. The paranasal sinuses are clear     .       Impression:      No acute infarct. Chronic appearing changes as above.           This report was signed and finalized on 8/7/2022 8:57 AM by Flex Che DO.    CT Angiogram Neck [[id number]] Collected: 08/05/22 2137     Updated: 08/05/22 2138    Narrative:      FINAL REPORT    TECHNIQUE:  Axial images through the neck were obtained following IV  contrast administration.    CLINICAL HISTORY:  Stroke, follow up    FINDINGS:  The aortic arch and the carotid arteries are normal as are the  bilateral vertebral arteries.      Impression:      Unremarkable.    Authenticated and Electronically Signed by Valente Watson M.D. on  08/05/2022 09:37:58 PM    CT Angiogram Head [[id number]] Collected: 08/05/22 2137     Updated: 08/05/22 2138    Narrative:      FINAL  REPORT    CLINICAL HISTORY:  Stroke, follow up    FINDINGS:  The intracranial portions of the internal carotid arteries are  unremarkable. The anterior and middle cerebral arteries are  unremarkable. The vertebral and basilar arteries are within  normal limits.      Impression:      No acute arterial abnormality.    Authenticated and Electronically Signed by Valente Watson M.D. on  08/05/2022 09:37:55 PM    CT Head Without Contrast [[id number]] Collected: 08/05/22 1657     Updated: 08/05/22 1658    Narrative:      FINAL REPORT    CLINICAL HISTORY:  weakness    FINDINGS:  Axial images of the head were obtained without contrast. Coronal  reformatted images were also obtained.This study was performed  with techniques to keep radiation doses as low as reasonably  achievable (ALARA). Individualized dose reduction techniques  using automated exposure control or adjustment of mA and/or kV  according to the patient's size were employed.  There is  moderate diffuse atrophy greatest in the left frontal lobe.  There are mild changes of chronic microvascular ischemia.  There  is no evidence of intracranial hemorrhage or mass. The  ventricular size is within normal limits. There is no evidence  of shift of the midline structures. No abnormal extra axial  fluid collection is identified. No skull abnormality is seen on  the bone window images.      Impression:      Moderate atrophy with mild changes of chronic microvascular  ischemia.  No acute intracranial abnormality.    Authenticated and Electronically Signed by Fadi Slade III, MD on 08/05/2022 04:57:29 PM    XR Chest 1 View [[id number]] Collected: 08/05/22 1620     Updated: 08/05/22 1623    Narrative:      PROCEDURE: XR CHEST 1 VW-     HISTORY: Chest pain protocol     COMPARISON:  None     FINDINGS:  Portable view of the chest demonstrates the lungs to be  grossly clear. There is no evidence of effusion, pneumothorax or other  significant pleural disease. The mediastinum is  unremarkable.     The heart size is normal.       Impression:      Unremarkable portable chest.      This report was signed and finalized on 8/5/2022 4:20 PM by Damion Harvey MD.        Condition on Discharge:      Stable.    Code status during the hospital stay:    Code Status and Medical Interventions:   Ordered at: 08/05/22 2132     Code Status (Patient has no pulse and is not breathing):    CPR (Attempt to Resuscitate)     Medical Interventions (Patient has pulse or is breathing):    Full Support     Discharge Disposition:    Home or Self Care    Discharge Medications:       Discharge Medications      New Medications      Instructions Start Date   aspirin 81 MG EC tablet   81 mg, Oral, Daily   Start Date: August 8, 2022     atorvastatin 40 MG tablet  Commonly known as: LIPITOR   40 mg, Oral, Nightly      divalproex 250 MG DR tablet  Commonly known as: DEPAKOTE   250 mg, Oral, 2 Times Daily         Changes to Medications      Instructions Start Date   acetaminophen 650 MG 8 hr tablet  Commonly known as: TYLENOL  What changed:   · when to take this  · reasons to take this   650 mg, Oral, 2 Times Daily PRN      acyclovir 400 MG tablet  Commonly known as: ZOVIRAX  What changed:   · when to take this  · reasons to take this   400 mg, Oral, Every 4 Hours While Awake, Take no more than 5 doses a day.         Continue These Medications      Instructions Start Date   Cholecalciferol 50 MCG (2000 UT) capsule   2,000 Units, Oral      Diclofenac Sodium 1 % gel gel  Commonly known as: VOLTAREN   2 g, Topical, Every 12 Hours      estradiol 0.1 MG/GM vaginal cream  Commonly known as: ESTRACE   0.5  grams intravaginal 2x/week      folic acid 1 MG tablet  Commonly known as: FOLVITE   1 mg, Oral, Daily      HYDROcodone-acetaminophen 5-325 MG per tablet  Commonly known as: NORCO   1-2 tablets, Oral, Every 4 Hours PRN      hydroxychloroquine 200 MG tablet  Commonly known as: PLAQUENIL   200 mg, 2 Times Daily      methotrexate 2.5  MG tablet   15 mg, Oral, Weekly, On Sunday   PT TAKES 7.5MG IN MORNING AND .7.5MG IN EVENING ON SUNDAYS      omeprazole 20 MG capsule  Commonly known as: priLOSEC   TAKE 1 CAPSULE BY MOUTH EVERY DAY         Stop These Medications    hydroCHLOROthiazide 12.5 MG tablet  Commonly known as: HYDRODIURIL     ZyrTEC Allergy 10 MG capsule  Generic drug: Cetirizine HCl          Discharge Diet:     Diet Instructions     Diet: Cardiac; Thin      Discharge Diet: Cardiac    Fluid Consistency: Thin        Activity at Discharge:     Activity Instructions     Activity as Tolerated          Follow-up Appointments:    Additional Instructions for the Follow-ups that You Need to Schedule     Ambulatory Referral to Home Health ([facility])   As directed      Face to Face Visit Date: 8/7/2022    Follow-up provider for Plan of Care?: I treated the patient in an acute care facility and will not continue treatment after discharge.    Follow-up provider: MATI ART [[id number]]    Reason/Clinical Findings: TIA, chronic headache, rheumatoid arthritis, unsteady gait    Describe mobility limitations that make leaving home difficult: Generalized weakness, fall risk    Nursing/Therapeutic Services Requested: Skilled Nursing Physical Therapy Occupational Therapy    Skilled nursing orders: Medication education Cardiopulmonary assessments    PT orders: Gait Training Therapeutic exercise Transfer training Strengthening    Weight Bearing Status: As Tolerated    Occupational orders: Activities of daily living Strengthening    Frequency: 1 Week 1            Follow-up Information     Mati Art MD Follow up in 1 week(s).    Specialty: Internal Medicine  Why: with CMP  Contact information:  [address] [place] [id number] [phone]                       Test Results Pending at Discharge:    2D echocardiogram was done on 8/7/2022-report currently pending.       Tylor Peña MD  08/07/22  14:02 EDT    Time: I spent 25 minutes on this  discharge activity which included: face-to-face encounter with the patient, reviewing the data in the system, coordination of the care with the nursing staff as well as consultants, documentation, and entering orders.     Dictated utilizing Dragon dictation.

## 2022-08-07 NOTE — PLAN OF CARE
Goal Outcome Evaluation:  Plan of Care Reviewed With: patient        Progress: improving  Outcome Evaluation: VSS, UP IN ROOM WITH MINIMAL ASSISTANCE, NO CHANGE IN NEURO STATUS NOTED.

## 2022-08-07 NOTE — OUTREACH NOTE
Prep Survey    Flowsheet Row Responses   [facility] discharged from? [place]   Is LACE score < 7 ? Yes   Emergency Room discharge w/ pulse ox? No   Eligibility [facility]   Date of Admission 08/05/22   Date of Discharge 08/07/22   Discharge Disposition Home or Self Care   Discharge diagnosis generalized weakness, suspected TIA, Rheumatoid arthritis   Does the patient have one of the following disease processes/diagnoses(primary or secondary)? Stroke (TIA)   Does the patient have Home health ordered? No   Is there a DME ordered? No   Prep survey completed? Yes          NORMA TORRES - Registered Nurse

## 2022-08-08 ENCOUNTER — TRANSITIONAL CARE MANAGEMENT TELEPHONE ENCOUNTER (OUTPATIENT)
Dept: CALL CENTER | Facility: HOSPITAL | Age: 77
End: 2022-08-08

## 2022-08-08 LAB
BH CV ECHO MEAS - AO MAX PG: 7.5 MMHG
BH CV ECHO MEAS - AO MEAN PG: 4 MMHG
BH CV ECHO MEAS - AO ROOT DIAM: 3.4 CM
BH CV ECHO MEAS - AO V2 MAX: 137 CM/SEC
BH CV ECHO MEAS - AO V2 VTI: 28.2 CM
BH CV ECHO MEAS - AVA(I,D): 2.7 CM2
BH CV ECHO MEAS - EDV(CUBED): 118.4 ML
BH CV ECHO MEAS - EDV(MOD-SP2): 171 ML
BH CV ECHO MEAS - EDV(MOD-SP4): 114 ML
BH CV ECHO MEAS - EF(MOD-BP): 63.9 %
BH CV ECHO MEAS - EF(MOD-SP2): 64.7 %
BH CV ECHO MEAS - EF(MOD-SP4): 62.7 %
BH CV ECHO MEAS - ESV(CUBED): 51.5 ML
BH CV ECHO MEAS - ESV(MOD-SP2): 60.4 ML
BH CV ECHO MEAS - ESV(MOD-SP4): 42.5 ML
BH CV ECHO MEAS - FS: 24.2 %
BH CV ECHO MEAS - IVS/LVPW: 0.98 CM
BH CV ECHO MEAS - IVSD: 0.9 CM
BH CV ECHO MEAS - LA DIMENSION: 3.4 CM
BH CV ECHO MEAS - LAT PEAK E' VEL: 7.5 CM/SEC
BH CV ECHO MEAS - LV DIASTOLIC VOL/BSA (35-75): 52.3 CM2
BH CV ECHO MEAS - LV MASS(C)D: 155.7 GRAMS
BH CV ECHO MEAS - LV MAX PG: 3.3 MMHG
BH CV ECHO MEAS - LV MEAN PG: 2 MMHG
BH CV ECHO MEAS - LV SYSTOLIC VOL/BSA (12-30): 19.5 CM2
BH CV ECHO MEAS - LV V1 MAX: 90.9 CM/SEC
BH CV ECHO MEAS - LV V1 VTI: 22.1 CM
BH CV ECHO MEAS - LVIDD: 4.9 CM
BH CV ECHO MEAS - LVIDS: 3.7 CM
BH CV ECHO MEAS - LVOT AREA: 3.5 CM2
BH CV ECHO MEAS - LVOT DIAM: 2.1 CM
BH CV ECHO MEAS - LVPWD: 0.92 CM
BH CV ECHO MEAS - MED PEAK E' VEL: 6.5 CM/SEC
BH CV ECHO MEAS - MV A MAX VEL: 77.1 CM/SEC
BH CV ECHO MEAS - MV DEC TIME: 0.28 MSEC
BH CV ECHO MEAS - MV E MAX VEL: 54.3 CM/SEC
BH CV ECHO MEAS - MV E/A: 0.7
BH CV ECHO MEAS - MV MAX PG: 2.21 MMHG
BH CV ECHO MEAS - MV MEAN PG: 1 MMHG
BH CV ECHO MEAS - MV V2 VTI: 27.5 CM
BH CV ECHO MEAS - MVA(VTI): 2.8 CM2
BH CV ECHO MEAS - PA ACC TIME: 0.09 SEC
BH CV ECHO MEAS - PA PR(ACCEL): 40.8 MMHG
BH CV ECHO MEAS - PA V2 MAX: 62.1 CM/SEC
BH CV ECHO MEAS - PULM DIAS VEL: 49 CM/SEC
BH CV ECHO MEAS - PULM S/D: 1.05
BH CV ECHO MEAS - PULM SYS VEL: 51.4 CM/SEC
BH CV ECHO MEAS - RV MAX PG: 1.13 MMHG
BH CV ECHO MEAS - RV V1 MAX: 53.2 CM/SEC
BH CV ECHO MEAS - RV V1 VTI: 11.5 CM
BH CV ECHO MEAS - SI(MOD-SP2): 50.7 ML/M2
BH CV ECHO MEAS - SI(MOD-SP4): 32.8 ML/M2
BH CV ECHO MEAS - SV(LVOT): 76.5 ML
BH CV ECHO MEAS - SV(MOD-SP2): 110.6 ML
BH CV ECHO MEAS - SV(MOD-SP4): 71.5 ML
BH CV ECHO MEAS - TAPSE (>1.6): 2.38 CM
BH CV ECHO MEASUREMENTS AVERAGE E/E' RATIO: 7.76
BH CV XLRA - RV BASE: 3.8 CM
BH CV XLRA - RV LENGTH: 7.9 CM
BH CV XLRA - RV MID: 2.8 CM
BH CV XLRA - TDI S': 13.1 CM/SEC
LEFT ATRIUM VOLUME INDEX: 50.9 ML/M2
MAXIMAL PREDICTED HEART RATE: 141 BPM
STRESS TARGET HR: 120 BPM

## 2022-08-08 NOTE — OUTREACH NOTE
Call Center TCM Note    Flowsheet Row Responses   [facility] patient discharged from? [facility]   Does the patient have one of the following disease processes/diagnoses(primary or secondary)? Stroke (TIA)   TCM attempt successful? Yes   Call start time 1640   Call end time 1652   Discharge diagnosis generalized weakness, suspected TIA, Rheumatoid arthritis   Meds reviewed with patient/caregiver? Yes   Is the patient having any side effects they believe may be caused by any medication additions or changes? No   Does the patient have all medications ordered at discharge? Yes   Is the patient taking all medications as directed (includes completed medication regime)? Yes   Does the patient have a primary care provider?  Yes   Does the patient have an appointment with their PCP within 7 days of discharge? Yes   Comments regarding PCP Hospital followup appt scheduled on 8/15/2022 with Dr Samson   Has the patient kept scheduled appointments due by today? N/A   Has home health visited the patient within 72 hours of discharge? N/A   Psychosocial issues? No   Does the patient require any assistance with activities of daily living such as eating, bathing, dressing, walking, etc.? No   Does the patient have any residual symptoms from stroke/TIA? No   Does the patient understand the diet ordered at discharge? Yes   Did the patient receive a copy of their discharge instructions? Yes   Nursing interventions Reviewed instructions with patient   What is the patient's perception of their health status since discharge? Improving   Nursing interventions Nurse provided patient education   Is the patient able to teach back FAST for Stroke? Yes   Is the patient/caregiver able to teach back the risk factors for a stroke? High Cholesterol   Is the patient/caregiver able to teach back signs and symptoms related to disease process for when to call PCP? Yes   Is the patient/caregiver able to teach back signs and symptoms related to disease  process for when to call 911? Yes   If the patient is a current smoker, are they able to teach back resources for cessation? Not a smoker   Is the patient/caregiver able to teach back the hierarchy of who to call/visit for symptoms/problems? PCP, Specialist, Home health nurse, Urgent Care, ED, 911 Yes   TCM call completed? Yes   Wrap up additional comments No issues or concerns.           Pieter Monsivais RN    8/8/2022, 16:52 EDT

## 2022-08-09 ENCOUNTER — TELEPHONE (OUTPATIENT)
Dept: INTERNAL MEDICINE | Facility: CLINIC | Age: 77
End: 2022-08-09

## 2022-08-09 NOTE — TELEPHONE ENCOUNTER
Caller: DEMI    Relationship: [facility]    Best call back number: [phone]    What orders are you requesting (i.e. lab or imaging): VERBAL ORDER TO CONTINUE HOME PHYSICAL THERAPY    IAdditional notes:

## 2022-08-15 ENCOUNTER — OFFICE VISIT (OUTPATIENT)
Dept: INTERNAL MEDICINE | Facility: CLINIC | Age: 77
End: 2022-08-15

## 2022-08-15 ENCOUNTER — READMISSION MANAGEMENT (OUTPATIENT)
Dept: CALL CENTER | Facility: HOSPITAL | Age: 77
End: 2022-08-15

## 2022-08-15 VITALS
HEIGHT: 69 IN | OXYGEN SATURATION: 97 % | DIASTOLIC BLOOD PRESSURE: 70 MMHG | HEART RATE: 73 BPM | TEMPERATURE: 97.1 F | WEIGHT: 232 LBS | BODY MASS INDEX: 34.36 KG/M2 | SYSTOLIC BLOOD PRESSURE: 142 MMHG

## 2022-08-15 DIAGNOSIS — M06.9 RHEUMATOID ARTHRITIS, INVOLVING UNSPECIFIED SITE, UNSPECIFIED WHETHER RHEUMATOID FACTOR PRESENT: ICD-10-CM

## 2022-08-15 DIAGNOSIS — G45.9 TIA (TRANSIENT ISCHEMIC ATTACK): Primary | ICD-10-CM

## 2022-08-15 DIAGNOSIS — I10 ESSENTIAL HYPERTENSION: ICD-10-CM

## 2022-08-15 PROCEDURE — 1111F DSCHRG MED/CURRENT MED MERGE: CPT | Performed by: INTERNAL MEDICINE

## 2022-08-15 PROCEDURE — 99496 TRANSJ CARE MGMT HIGH F2F 7D: CPT | Performed by: INTERNAL MEDICINE

## 2022-08-15 RX ORDER — HYDROCHLOROTHIAZIDE 12.5 MG/1
12.5 TABLET ORAL DAILY
Qty: 90 TABLET | Refills: 3 | Status: SHIPPED | OUTPATIENT
Start: 2022-08-15

## 2022-08-15 NOTE — OUTREACH NOTE
Stroke Week 2 Survey    Flowsheet Row Responses   [facility]? [facility]   Does the patient have one of the following disease processes/diagnoses(primary or secondary)? Stroke (TIA)   Week 2 attempt successful? No   Unsuccessful attempts Attempt 1          SAMSON GILL - Registered Nurse

## 2022-08-15 NOTE — PROGRESS NOTES
Transitional Care Follow Up Visit  Subjective     Marisol Muro is a 77 y.o. female who presents for a transitional care management visit.    Within 48 business hours after discharge our office contacted her via telephone to coordinate her care and needs.      I reviewed and discussed the details of that call along with the discharge summary, hospital problems, inpatient lab results, inpatient diagnostic studies, and consultation reports with Marisol.     Current outpatient and discharge medications have been reconciled for the patient.  Reviewed by: Mati Samson MD      Date of TCM Phone Call 8/7/2022   [facility]   Date of Admission 8/5/2022   Date of Discharge 8/7/2022   Discharge Disposition Home or Self Care     Risk for Readmission (LACE) Score: 6 (8/7/2022  6:00 AM)      History of Present Illness   Course During Hospital Stay: Presented to the emergency room with complaints of generalized weakness with some slurring of the speech.  Underwent CT imaging which did not show any acute abnormality.  CT angiogram of the head and neck did not show any large vessel occlusions.  On admission had imaging study done with an MRI which did not show any acute injury but evidence of encephalomalacia and chronic microvascular disease.  It was thought that the patient may have had a TIA she was placed on a statin and continue with antihypertensive meds.  HCTZ was discontinued because she had low normal blood pressure.  Since discharge has had some leg swelling and mild elevation in her BP  History of headaches for which she was placed on Depakote twice a day.  She continues to do well post discharge.  Has had about a 30-pack-year tobacco use history has not smoked in the last 20 years   The following portions of the patient's history were reviewed and updated as appropriate: allergies, current medications, past family history, past medical history, past social history, past surgical history and problem  list.    Review of Systems   Constitutional: Positive for fatigue. Negative for activity change, appetite change and fever.   HENT: Negative for congestion, ear discharge, ear pain and trouble swallowing.    Eyes: Negative for photophobia and visual disturbance.   Respiratory: Negative for cough and shortness of breath.    Cardiovascular: Positive for leg swelling. Negative for chest pain and palpitations.   Gastrointestinal: Negative for abdominal distention, abdominal pain, constipation, diarrhea, nausea and vomiting.   Endocrine: Negative.    Genitourinary: Negative for dysuria, hematuria and urgency.   Musculoskeletal: Positive for arthralgias. Negative for back pain, joint swelling and myalgias.   Skin: Negative for color change and rash.   Allergic/Immunologic: Negative.    Neurological: Negative for dizziness, weakness, light-headedness and headaches.   Hematological: Negative for adenopathy. Does not bruise/bleed easily.   Psychiatric/Behavioral: Negative for agitation, confusion and dysphoric mood. The patient is not nervous/anxious.        Objective   Physical Exam  Constitutional:       General: She is not in acute distress.     Appearance: She is well-developed.   HENT:      Nose: Nose normal.   Eyes:      General: No scleral icterus.     Conjunctiva/sclera: Conjunctivae normal.   Neck:      Thyroid: No thyromegaly.      Trachea: No tracheal deviation.   Cardiovascular:      Rate and Rhythm: Normal rate and regular rhythm.      Heart sounds: No murmur heard.    No friction rub.   Pulmonary:      Effort: No respiratory distress.      Breath sounds: No wheezing or rales.   Abdominal:      General: There is no distension.      Palpations: Abdomen is soft. There is no mass.      Tenderness: There is no abdominal tenderness. There is no guarding.   Musculoskeletal:         General: No deformity. Normal range of motion.   Lymphadenopathy:      Cervical: No cervical adenopathy.   Skin:     General: Skin is warm  and dry.      Findings: No erythema or rash.   Neurological:      Mental Status: She is alert and oriented to person, place, and time.      Cranial Nerves: No cranial nerve deficit.      Coordination: Coordination normal.      Deep Tendon Reflexes: Reflexes are normal and symmetric.   Psychiatric:         Behavior: Behavior normal.         Thought Content: Thought content normal.         Judgment: Judgment normal.         Assessment & Plan   Diagnoses and all orders for this visit:    1. TIA (transient ischemic attack) (Primary) does not use tobacco now continue with statins and adequate BP control imaging studies without evidence of carotid artery stenosis.  Echocardiogram okay no rhythm disturbances noted during stay in the hospital    2. Essential hypertension stable with current meds added on HCTZ again in view of her suboptimal BP control and leg swelling    3. Rheumatoid arthritis, involving unspecified site, unspecified whether rheumatoid factor present (HCC) stable with current meds

## 2022-08-21 ENCOUNTER — PATIENT MESSAGE (OUTPATIENT)
Dept: INTERNAL MEDICINE | Facility: CLINIC | Age: 77
End: 2022-08-21

## 2022-08-22 RX ORDER — ATORVASTATIN CALCIUM 40 MG/1
40 TABLET, FILM COATED ORAL NIGHTLY
Qty: 90 TABLET | Refills: 3 | Status: SHIPPED | OUTPATIENT
Start: 2022-08-22

## 2022-08-22 NOTE — TELEPHONE ENCOUNTER
From: Marisol Muro  To: Mati Samson MD  Sent: 8/21/2022 3:33 PM EDT  Subject: Lipitor refill    I started taking Lipitor while at [facility]. That 30 day prescription has run out and can't be refilled. I guess I need a new script for a 90 day supply [facility]. Thank you.

## 2022-08-22 NOTE — TELEPHONE ENCOUNTER
Rx Refill Note  Requested Prescriptions     Pending Prescriptions Disp Refills   • atorvastatin (LIPITOR) 40 MG tablet 90 tablet 3     Sig: Take 1 tablet by mouth Every Night.      Last office visit with prescribing clinician: 8/15/2022      Next office visit with prescribing clinician: 10/27/2022            Kenton Flores MA  08/22/22, 08:24 EDT       Last sent by the ED

## 2022-10-10 RX ORDER — OMEPRAZOLE 20 MG/1
CAPSULE, DELAYED RELEASE ORAL
Qty: 90 CAPSULE | Refills: 3 | Status: SHIPPED | OUTPATIENT
Start: 2022-10-10 | End: 2022-10-13 | Stop reason: SDUPTHER

## 2022-10-13 RX ORDER — OMEPRAZOLE 20 MG/1
20 CAPSULE, DELAYED RELEASE ORAL DAILY
Qty: 90 CAPSULE | Refills: 3 | Status: SHIPPED | OUTPATIENT
Start: 2022-10-13

## 2022-10-19 ENCOUNTER — OFFICE VISIT (OUTPATIENT)
Dept: OBSTETRICS AND GYNECOLOGY | Facility: CLINIC | Age: 77
End: 2022-10-19

## 2022-10-19 VITALS
SYSTOLIC BLOOD PRESSURE: 138 MMHG | DIASTOLIC BLOOD PRESSURE: 74 MMHG | BODY MASS INDEX: 33.47 KG/M2 | HEIGHT: 69 IN | WEIGHT: 226 LBS

## 2022-10-19 DIAGNOSIS — K59.00 CONSTIPATION, UNSPECIFIED CONSTIPATION TYPE: ICD-10-CM

## 2022-10-19 DIAGNOSIS — N81.6 RECTOCELE: ICD-10-CM

## 2022-10-19 DIAGNOSIS — R10.2 PELVIC PRESSURE IN FEMALE: ICD-10-CM

## 2022-10-19 DIAGNOSIS — Z46.89 ENCOUNTER FOR PESSARY MAINTENANCE: Primary | ICD-10-CM

## 2022-10-19 DIAGNOSIS — R32 URINARY INCONTINENCE, UNSPECIFIED TYPE: ICD-10-CM

## 2022-10-19 DIAGNOSIS — G45.9 TIA (TRANSIENT ISCHEMIC ATTACK): ICD-10-CM

## 2022-10-19 DIAGNOSIS — N81.11 CYSTOCELE, MIDLINE: ICD-10-CM

## 2022-10-19 DIAGNOSIS — N95.2 POSTMENOPAUSAL ATROPHIC VAGINITIS: ICD-10-CM

## 2022-10-19 DIAGNOSIS — R39.198 DIFFICULTY VOIDING: ICD-10-CM

## 2022-10-19 PROCEDURE — 99214 OFFICE O/P EST MOD 30 MIN: CPT | Performed by: OBSTETRICS & GYNECOLOGY

## 2022-10-20 NOTE — PROGRESS NOTES
Chief Complaint  Pessary Check (Follow up pessary insertion )     History of Present Illness:  Patient is 77 y.o.  who presents to [facility] here for follow-up evaluation of her prolapse as well as pessary.  Patient had previously been seen and was fitted with a size #4 incontinence dish.  Patient was unable to retain the incontinence dish.  A size #5 incontinence dish had been ordered.  Patient is here for possible placement.  She reports having continued pressure and bulge per vagina.  She has been using her estrogen cream but not consistently.  She has continued to have difficulty voiding as well as urinary incontinence.  In the interim the patient has suffered from a TIA.  Patient was evaluated at the hospital.  She is currently on aspirin 81 mg and Lipitor.  Patient did require rehabilitation.  She has not had a follow-up with neurology.  Patient sees Dr. Samson for her primary care.  She does have a follow-up with him next week.  Patient is walking with a cane today.  She denies any dysuria, flank pain, fever or chills.    History  Past Medical History:   Diagnosis Date   • Asymptomatic bacteriuria    • Cataract    • COVID-19 vaccine series completed     Moderna   • Ear piercing    • Esophageal stricture    • GERD (gastroesophageal reflux disease)    • History of prolapse of bladder    • HL (hearing loss)     no hearing aids   • Hypertension    • Impaired fasting glucose    • Obesity    • Osteoarthritis    • Rheumatoid arthritis (HCC)    • Skin cancer     left ankle   • Stroke (HCC) 20   • Wears glasses      Current Outpatient Medications on File Prior to Visit   Medication Sig Dispense Refill   • acetaminophen (TYLENOL) 650 MG 8 hr tablet Take 1 tablet by mouth 2 (Two) Times a Day As Needed for Mild Pain .     • acyclovir (ZOVIRAX) 400 MG tablet Take 1 tablet by mouth Every 4 (Four) Hours While Awake. Take no more than 5 doses a day. (Patient taking differently: Take 400 mg  by mouth Daily As Needed. Take no more than 5 doses a day.) 50 tablet 3   • aspirin 81 MG EC tablet Take 1 tablet by mouth Daily. 30 tablet 0   • atorvastatin (LIPITOR) 40 MG tablet Take 1 tablet by mouth Every Night. 90 tablet 3   • Cholecalciferol 50 MCG (2000 UT) capsule Take 2,000 Units by mouth.     • estradiol (ESTRACE) 0.1 MG/GM vaginal cream 0.5  grams intravaginal 2x/week 30 g 11   • folic acid (FOLVITE) 1 MG tablet Take 1 mg by mouth Daily.  0   • hydroCHLOROthiazide (HYDRODIURIL) 12.5 MG tablet Take 1 tablet by mouth Daily. 90 tablet 3   • hydroxychloroquine (PLAQUENIL) 200 MG tablet 200 mg 2 (Two) Times a Day.  1   • methotrexate 2.5 MG tablet Take 15 mg by mouth 1 (One) Time Per Week. On Sunday   PT TAKES 7.5MG IN MORNING AND .7.5MG IN EVENING ON SUNDAYS     • omeprazole (priLOSEC) 20 MG capsule Take 1 capsule by mouth Daily. 90 capsule 3     No current facility-administered medications on file prior to visit.     Allergies   Allergen Reactions   • Latex Rash   • Tramadol Nausea Only and Other (See Comments)     GI upset   • Wound Dressing Adhesive Other (See Comments)     slight redness at tape site     Past Surgical History:   Procedure Laterality Date   • COLONOSCOPY     • ENDOSCOPY     • ESOPHAGEAL DILATATION     • GROIN HIDRADENITIS EXCISION Right 10/29/2021    Procedure: GROIN HIDRADENITIS EXCISION with excision of right leg mass;  Surgeon: Tanmay Clements MD;  Location: [facility];  Service: General;  Laterality: Right;   • JOINT REPLACEMENT     • LAPAROSCOPIC TUBAL LIGATION     • REPLACEMENT TOTAL KNEE BILATERAL      2004, 2008   • SKIN BIOPSY     • SKIN CANCER EXCISION     • TOTAL HIP ARTHROPLASTY  2014    right     Family History   Problem Relation Age of Onset   • Stroke Mother    • Arthritis Mother    • Heart disease Mother    • Lung cancer Father    • Cancer Father    • Hyperlipidemia Sister    • Hypertension Sister      Social History     Socioeconomic History   • Marital status:   "    Spouse name: Ashu   • Number of children: 2   Tobacco Use   • Smoking status: Former     Packs/day: 1.00     Years: 20.00     Pack years: 20.00     Types: Cigarettes     Quit date: 1983     Years since quittin.8   • Smokeless tobacco: Never   Vaping Use   • Vaping Use: Never used   Substance and Sexual Activity   • Alcohol use: Yes     Comment: rare    • Drug use: No   • Sexual activity: Not Currently     Partners: Male     Birth control/protection: Tubal ligation       Physical Examination:  Vital Signs: /74   Ht 175.3 cm (69\")   Wt 103 kg (226 lb)   BMI 33.37 kg/m²     General Appearance: alert, appears stated age, and cooperative  Breasts: Not performed.  Abdomen: no masses, no hepatomegaly, no splenomegaly, soft non-tender, no guarding, and no rebound tenderness  Pelvic: Clinical staff was present for exam  External genitalia:  normal appearance of the external genitalia including Bartholin's and [place]'s glands.  :  urethral meatus normal;  Vaginal:  atrophic mucosal changes are present;  Cervix:  normal  Uterus:  NSSAP  Adnexa:  non palpable bilaterally.  Cystocele GRADE 2  Rectocele GRADE 2  A size #5 incontinence dish is placed.  Patient tolerated the procedure well with no difficulties.    Data Review:      Labs:  CBC & Differential (2022 16:03)  Comprehensive Metabolic Panel (2022 16:03)  Troponin (2022 16:03)  TSH (2022 16:03)  T4, Free (2022 16:03)  Magnesium (2022 16:03)  Hemoglobin A1c (2022 16:03)  Urinalysis With Culture If Indicated - Urine, Clean Catch (2022 16:17)  POC Glucose Once (2022 20:48)  COVID PRE-OP / PRE-PROCEDURE SCREENING ORDER (NO ISOLATION) - Swab, Nasal Cavity (2022 23:48)  Lipid Panel (2022 06:08)  Basic Metabolic Panel (2022 05:13)  CBC (No Diff) (2022 05:13)    Imaging:    Medical Records:  Discharge Summary by Tylor Peña MD (2022 12:55)      Assessment and Plan   1. " Cystocele, midline  There are no changes noted on examination.  Patient desires placement of pessary today for management of her symptoms.  Patient tolerated procedure well.  She is to follow-up in 2 to 3 weeks as discussed.    2. Rectocele  There are no changes noted on examination.  I stressed to the patient the need to keep stools soft and no straining with defecation.  Pessary is placed today as noted.  She is to follow-up as discussed.    3. Postmenopausal atrophic vaginitis  Patient with continued atrophic changes.  She is to continue the use of her estrogen cream to apply to the periurethral area as well as vaginal introitus.    4. Difficulty voiding  Incontinence dish pessary is placed today as noted.  Patient is to follow-up as discussed.  She is to continue her estrogen cream.    5. Constipation, unspecified constipation type  Have stressed to the patient the need to keep her stools soft and no straining with defecation.  Patient is to increase her p.o. fluids as well.    6. Urinary incontinence, unspecified type  Patient with mixed urinary incontinence with probable urinary retention secondary to her prolapse.  Pessary is placed today as noted.  Patient is to follow-up as discussed.    7. Pelvic pressure in female  Patient with continued pelvic pressure as noted.  Pessary is placed as noted plan pending response to treatment.    8. Encounter for pessary maintenance  I have discussed with the patient the maintenance of her pessary.  Instructions and precautions have been given.  She is to follow-up in 2 to 3 weeks as discussed.    9. TIA (transient ischemic attack)  Patient with recent TIA.  She is now on aspirin.  Patient is to continue the use of her estrogen cream as discussed.  She is to keep her follow-up appointment with primary care as noted.    Follow Up/Instructions:  Follow up as noted.  Patient was given instructions and counseling regarding her condition or for health maintenance advice. Please  see specific information pulled into the AVS if appropriate.     Note: Speech recognition transcription software may have been used to dictate portions of this document.  An attempt at proofreading has been made though minor errors in transcription may still be present.    This note was electronically signed.  Rafia Owens M.D.

## 2022-10-27 ENCOUNTER — OFFICE VISIT (OUTPATIENT)
Dept: INTERNAL MEDICINE | Facility: CLINIC | Age: 77
End: 2022-10-27

## 2022-10-27 VITALS
WEIGHT: 227 LBS | SYSTOLIC BLOOD PRESSURE: 136 MMHG | DIASTOLIC BLOOD PRESSURE: 80 MMHG | BODY MASS INDEX: 33.62 KG/M2 | HEART RATE: 67 BPM | TEMPERATURE: 97.9 F | RESPIRATION RATE: 15 BRPM | HEIGHT: 69 IN | OXYGEN SATURATION: 99 %

## 2022-10-27 DIAGNOSIS — M06.9 RHEUMATOID ARTHRITIS, INVOLVING UNSPECIFIED SITE, UNSPECIFIED WHETHER RHEUMATOID FACTOR PRESENT: ICD-10-CM

## 2022-10-27 DIAGNOSIS — Z23 NEED FOR INFLUENZA VACCINATION: Primary | ICD-10-CM

## 2022-10-27 DIAGNOSIS — I10 ESSENTIAL HYPERTENSION: ICD-10-CM

## 2022-10-27 DIAGNOSIS — D50.9 IRON DEFICIENCY ANEMIA, UNSPECIFIED IRON DEFICIENCY ANEMIA TYPE: ICD-10-CM

## 2022-10-27 PROCEDURE — 1159F MED LIST DOCD IN RCRD: CPT | Performed by: INTERNAL MEDICINE

## 2022-10-27 PROCEDURE — G0439 PPPS, SUBSEQ VISIT: HCPCS | Performed by: INTERNAL MEDICINE

## 2022-10-27 PROCEDURE — 1170F FXNL STATUS ASSESSED: CPT | Performed by: INTERNAL MEDICINE

## 2022-10-27 PROCEDURE — G0008 ADMIN INFLUENZA VIRUS VAC: HCPCS | Performed by: INTERNAL MEDICINE

## 2022-10-27 PROCEDURE — 90662 IIV NO PRSV INCREASED AG IM: CPT | Performed by: INTERNAL MEDICINE

## 2022-10-27 NOTE — PROGRESS NOTES
Answers for HPI/ROS submitted by the patient on 10/27/2022  What is the primary reason for your visit?: Neurological Problem  altered mental status: No  clumsiness: No  focal sensory loss: No  focal weakness: No  loss of balance: Yes  memory loss: No  near-syncope: No  slurred speech: No  syncope: No  visual change: No  weakness: No  Chronicity: new  Onset: more than 1 month ago  Onset quality: suddenly  Progression since onset: resolved  Focality: lower extremity  abdominal pain: No  auditory change: No  aura: Yes  back pain: No  bladder incontinence: No  bowel incontinence: No  chest pain: No  confusion: No  diaphoresis: No  dizziness: Yes  fatigue: Yes  fever: No  headaches: Yes  light-headedness: No  nausea: Yes  neck pain: No  palpitations: No  shortness of breath: No  vertigo: No  vomiting: No  Treatments tried: acetaminophen, drinking, position change  Improvement on treatment: moderate

## 2022-10-27 NOTE — PROGRESS NOTES
The ABCs of the Annual Wellness Visit  Subsequent Medicare Wellness Visit    Chief Complaint   Patient presents with   • Medicare Wellness-subsequent      Subjective    History of Present Illness:  Marisol Muro is a 77 y.o. female who presents for a Subsequent Medicare Wellness Visit.    The following portions of the patient's history were reviewed and   updated as appropriate: allergies, current medications, past family history, past medical history, past social history, past surgical history and problem list.    Compared to one year ago, the patient feels her physical   health is the same.    Compared to one year ago, the patient feels her mental   health is the same.    Recent Hospitalizations:  This patient has had a [facility] admission record on file within the last 365 days.    Current Medical Providers:  Patient Care Team:  Mati Samson MD as PCP - General (Internal Medicine)  Walt Diamond MD as Consulting Physician (Rheumatology)    Outpatient Medications Prior to Visit   Medication Sig Dispense Refill   • acetaminophen (TYLENOL) 650 MG 8 hr tablet Take 1 tablet by mouth 2 (Two) Times a Day As Needed for Mild Pain .     • acyclovir (ZOVIRAX) 400 MG tablet Take 1 tablet by mouth Every 4 (Four) Hours While Awake. Take no more than 5 doses a day. (Patient taking differently: Take 400 mg by mouth Daily As Needed. Take no more than 5 doses a day.) 50 tablet 3   • aspirin 81 MG EC tablet Take 1 tablet by mouth Daily. 30 tablet 0   • atorvastatin (LIPITOR) 40 MG tablet Take 1 tablet by mouth Every Night. 90 tablet 3   • Cholecalciferol 50 MCG (2000 UT) capsule Take 2,000 Units by mouth.     • estradiol (ESTRACE) 0.1 MG/GM vaginal cream 0.5  grams intravaginal 2x/week 30 g 11   • folic acid (FOLVITE) 1 MG tablet Take 1 mg by mouth Daily.  0   • hydroCHLOROthiazide (HYDRODIURIL) 12.5 MG tablet Take 1 tablet by mouth Daily. 90 tablet 3   • hydroxychloroquine (PLAQUENIL) 200 MG tablet 200 mg 2  (Two) Times a Day.  1   • methotrexate 2.5 MG tablet Take 15 mg by mouth 1 (One) Time Per Week. On Sunday   PT TAKES 7.5MG IN MORNING AND .7.5MG IN EVENING ON SUNDAYS     • omeprazole (priLOSEC) 20 MG capsule Take 1 capsule by mouth Daily. 90 capsule 3     No facility-administered medications prior to visit.       No opioid medication identified on active medication list. I have reviewed chart for other potential  high risk medication/s and harmful drug interactions in the elderly.          Aspirin is on active medication list. Aspirin use is indicated based on review of current medical condition/s. Pros and cons of this therapy have been discussed today. Benefits of this medication outweigh potential harm.  Patient has been encouraged to continue taking this medication.  .      Patient Active Problem List   Diagnosis   • Rheumatoid arthritis (HCC)   • Osteoarthritis   • Essential hypertension   • GERD (gastroesophageal reflux disease)   • Esophageal stricture   • Obesity (BMI 30-39.9)   • Iron deficiency anemia   • Hidradenitis   • TIA (transient ischemic attack)     Advance Care Planning  Advance Directive is not on file.  ACP discussion was held with the patient during this visit. Patient does not have an advance directive, declines further assistance.    Review of Systems   Constitutional: Positive for fatigue. Negative for activity change, appetite change, diaphoresis and fever.   HENT: Negative for congestion, ear discharge, ear pain and trouble swallowing.    Eyes: Negative for photophobia and visual disturbance.   Respiratory: Negative for cough and shortness of breath.    Cardiovascular: Negative for chest pain and palpitations.   Gastrointestinal: Positive for nausea. Negative for abdominal distention, abdominal pain, constipation, diarrhea and vomiting.   Genitourinary: Negative for dysuria, hematuria and urgency.   Musculoskeletal: Negative for arthralgias, back pain, joint swelling, myalgias and neck  "pain.   Skin: Negative for color change and rash.   Neurological: Positive for dizziness. Negative for syncope, weakness, light-headedness and confusion.   Hematological: Negative for adenopathy. Does not bruise/bleed easily.   Psychiatric/Behavioral: Negative for agitation and dysphoric mood. The patient is not nervous/anxious.         Objective    Vitals:    10/27/22 1331   BP: 136/80   BP Location: Right arm   Patient Position: Sitting   Cuff Size: Adult   Pulse: 67   Resp: 15   Temp: 97.9 °F (36.6 °C)   SpO2: 99%   Weight: 103 kg (227 lb)   Height: 175.3 cm (69.02\")     Estimated body mass index is 33.51 kg/m² as calculated from the following:    Height as of this encounter: 175.3 cm (69.02\").    Weight as of this encounter: 103 kg (227 lb).    BMI is >= 30 and <35. (Class 1 Obesity). The following options were offered after discussion;: nutrition counseling/recommendations      Does the patient have evidence of cognitive impairment? No    Physical Exam  Constitutional:       General: She is not in acute distress.     Appearance: She is well-developed.   HENT:      Nose: Nose normal.   Eyes:      General: No scleral icterus.     Conjunctiva/sclera: Conjunctivae normal.   Neck:      Thyroid: No thyromegaly.      Trachea: No tracheal deviation.   Cardiovascular:      Rate and Rhythm: Normal rate and regular rhythm.      Heart sounds: No murmur heard.    No friction rub.   Pulmonary:      Effort: No respiratory distress.      Breath sounds: No wheezing or rales.   Abdominal:      General: There is no distension.      Palpations: Abdomen is soft. There is no mass.      Tenderness: There is no abdominal tenderness. There is no guarding.   Musculoskeletal:         General: No deformity. Normal range of motion.   Lymphadenopathy:      Cervical: No cervical adenopathy.   Skin:     General: Skin is warm and dry.      Findings: No erythema or rash.   Neurological:      Mental Status: She is alert and oriented to person, " place, and time.      Cranial Nerves: No cranial nerve deficit.      Coordination: Coordination normal.      Deep Tendon Reflexes: Reflexes are normal and symmetric.   Psychiatric:         Behavior: Behavior normal.         Thought Content: Thought content normal.         Judgment: Judgment normal.       Lab Results   Component Value Date    TRIG 53 2022    HDL 83 (H) 2022    LDL 77 2022    VLDL 11 2022    HGBA1C 5.50 2022            HEALTH RISK ASSESSMENT    Smoking Status:  Social History     Tobacco Use   Smoking Status Former   • Packs/day: 1.00   • Years: 20.00   • Pack years: 20.00   • Types: Cigarettes   • Quit date: 1983   • Years since quittin.8   Smokeless Tobacco Never     Alcohol Consumption:  Social History     Substance and Sexual Activity   Alcohol Use Yes    Comment: rare      Fall Risk Screen:    ROSMERY Fall Risk Assessment was completed, and patient is at MODERATE risk for falls. Assessment completed on:10/27/2022    Depression Screening:  PHQ-2/PHQ-9 Depression Screening 10/27/2022   Retired Total Score -   Little Interest or Pleasure in Doing Things 0-->not at all   Feeling Down, Depressed or Hopeless 0-->not at all   Trouble Falling or Staying Asleep, or Sleeping Too Much -   Feeling Tired or Having Little Energy -   Poor Appetite or Overeating -   Feeling Bad about Yourself - or that You are a Failure or Have Let Yourself or Your Family Down -   Trouble Concentrating on Things, Such as Reading the Newspaper or Watching Television -   Moving or Speaking So Slowly that Other People Could Have Noticed? Or the Opposite - Being So Fidgety -   Thoughts that You Would be Better Off Dead or of Hurting Yourself in Some Way -   PHQ-9: Brief Depression Severity Measure Score 0   If You Checked Off Any Problems, How Difficult Have These Problems Made It For You to Do Your Work, Take Care of Things at Home, or Get Along with Other People? -       Health Habits and  Functional and Cognitive Screening:  Functional & Cognitive Status 10/27/2022   Do you have difficulty preparing food and eating? No   Do you have difficulty bathing yourself, getting dressed or grooming yourself? No   Do you have difficulty using the toilet? No   Do you have difficulty moving around from place to place? No   Do you have trouble with steps or getting out of a bed or a chair? No   Current Diet Well Balanced Diet   Dental Exam Up to date   Eye Exam Up to date   Exercise (times per week) 3 times per week   Current Exercises Include House Cleaning   Current Exercise Activities Include -   Do you need help using the phone?  No   Are you deaf or do you have serious difficulty hearing?  No   Do you need help with transportation? No   Do you need help shopping? No   Do you need help preparing meals?  No   Do you need help with housework?  No   Do you need help with laundry? No   Do you need help taking your medications? No   Do you need help managing money? No   Do you ever drive or ride in a car without wearing a seat belt? No   Have you felt unusual stress, anger or loneliness in the last month? No   Who do you live with? Sibling   If you need help, do you have trouble finding someone available to you? No   Have you been bothered in the last four weeks by sexual problems? No   Do you have difficulty concentrating, remembering or making decisions? -       Age-appropriate Screening Schedule:  Refer to the list below for future screening recommendations based on patient's age, sex and/or medical conditions. Orders for these recommended tests are listed in the plan section. The patient has been provided with a written plan.    Health Maintenance   Topic Date Due   • MAMMOGRAM  Never done   • TDAP/TD VACCINES (1 - Tdap) Never done   • ZOSTER VACCINE (2 of 3) 10/27/2014   • DXA SCAN  11/15/2023   • INFLUENZA VACCINE  Completed              Assessment & Plan   CMS Preventative Services Quick Reference  Risk  Factors Identified During Encounter  Polypharmacy  The above risks/problems have been discussed with the patient.  Follow up actions/plans if indicated are seen below in the Assessment/Plan Section.  Pertinent information has been shared with the patient in the After Visit Summary.    Diagnoses and all orders for this visit:    1. Need for influenza vaccination (Primary)  -     Fluzone High-Dose 65+yrs ([id number])    2. Rheumatoid arthritis, involving unspecified site, unspecified whether rheumatoid factor present (HCC) stable no recent exacerbation tolerating methotrexate and Plaquenil    3. Essential hypertension continue with low-salt diet HCTZ follow readings at home    4. Iron deficiency anemia, unspecified iron deficiency anemia type stable last CBC without evidence of anemia        Follow Up:   No follow-ups on file.     An After Visit Summary and PPPS were made available to the patient.                       Answers for HPI/ROS submitted by the patient on 10/27/2022  What is the primary reason for your visit?: Neurological Problem  altered mental status: No  clumsiness: No  focal sensory loss: No  focal weakness: No  loss of balance: Yes  memory loss: No  near-syncope: No  slurred speech: No  visual change: No  Chronicity: new  Onset: more than 1 month ago  Onset quality: suddenly  Progression since onset: resolved  Focality: lower extremity  auditory change: No  aura: Yes  bladder incontinence: No  bowel incontinence: No  vertigo: No  Treatments tried: acetaminophen, drinking, position change  Improvement on treatment: moderate

## 2022-11-10 ENCOUNTER — OFFICE VISIT (OUTPATIENT)
Dept: OBSTETRICS AND GYNECOLOGY | Facility: CLINIC | Age: 77
End: 2022-11-10

## 2022-11-10 VITALS
WEIGHT: 226 LBS | BODY MASS INDEX: 33.47 KG/M2 | HEIGHT: 69 IN | DIASTOLIC BLOOD PRESSURE: 74 MMHG | SYSTOLIC BLOOD PRESSURE: 134 MMHG

## 2022-11-10 DIAGNOSIS — R39.198 DIFFICULTY VOIDING: ICD-10-CM

## 2022-11-10 DIAGNOSIS — N95.2 POSTMENOPAUSAL ATROPHIC VAGINITIS: ICD-10-CM

## 2022-11-10 DIAGNOSIS — R32 URINARY INCONTINENCE, UNSPECIFIED TYPE: ICD-10-CM

## 2022-11-10 DIAGNOSIS — N81.11 CYSTOCELE, MIDLINE: ICD-10-CM

## 2022-11-10 DIAGNOSIS — K59.00 CONSTIPATION, UNSPECIFIED CONSTIPATION TYPE: ICD-10-CM

## 2022-11-10 DIAGNOSIS — Z46.89 ENCOUNTER FOR PESSARY MAINTENANCE: Primary | ICD-10-CM

## 2022-11-10 DIAGNOSIS — R10.2 PELVIC PRESSURE IN FEMALE: ICD-10-CM

## 2022-11-10 DIAGNOSIS — N81.6 RECTOCELE: ICD-10-CM

## 2022-11-10 PROCEDURE — 99214 OFFICE O/P EST MOD 30 MIN: CPT | Performed by: OBSTETRICS & GYNECOLOGY

## 2022-11-11 NOTE — PROGRESS NOTES
Chief Complaint  Pessary Check (Follow up pessary )     History of Present Illness:  Patient is 77 y.o.  who presents to [facility] here for follow-up evaluation of her prolapse and pessary.  Patient reports she has been doing well.  The pessary stayed in place.  Patient does report good relief with pressure and bulge per vagina.  She denies any vaginal bleeding or spotting.  She has been using her estrogen cream.  Patient has continued on her baby aspirin.  She denies any further TIA symptoms.  Patient is being followed by Dr. Samson.  Patient reports good relief with her pessary.  Patient does report improvement in her difficulty voiding.  She continues to have her urinary incontinence.  Patient denies any dysuria.  She denies any flank pain, fever, or chills.  Patient continues to have constipation.    History  Past Medical History:   Diagnosis Date   • Asymptomatic bacteriuria    • Cataract    • COVID-19 vaccine series completed     Moderna   • Ear piercing    • Esophageal stricture    • GERD (gastroesophageal reflux disease)    • History of prolapse of bladder    • HL (hearing loss)     no hearing aids   • Hypertension    • Impaired fasting glucose    • Obesity    • Osteoarthritis    • Rheumatoid arthritis (HCC)    • Skin cancer     left ankle   • Stroke (HCC) 20   • Wears glasses      Current Outpatient Medications on File Prior to Visit   Medication Sig Dispense Refill   • acetaminophen (TYLENOL) 650 MG 8 hr tablet Take 1 tablet by mouth 2 (Two) Times a Day As Needed for Mild Pain .     • acyclovir (ZOVIRAX) 400 MG tablet Take 1 tablet by mouth Every 4 (Four) Hours While Awake. Take no more than 5 doses a day. (Patient taking differently: Take 400 mg by mouth Daily As Needed. Take no more than 5 doses a day.) 50 tablet 3   • aspirin 81 MG EC tablet Take 1 tablet by mouth Daily. 30 tablet 0   • atorvastatin (LIPITOR) 40 MG tablet Take 1 tablet by mouth Every Night. 90 tablet 3    • Cholecalciferol 50 MCG ( UT) capsule Take 2,000 Units by mouth.     • estradiol (ESTRACE) 0.1 MG/GM vaginal cream 0.5  grams intravaginal 2x/week 30 g 11   • folic acid (FOLVITE) 1 MG tablet Take 1 mg by mouth Daily.  0   • hydroCHLOROthiazide (HYDRODIURIL) 12.5 MG tablet Take 1 tablet by mouth Daily. 90 tablet 3   • hydroxychloroquine (PLAQUENIL) 200 MG tablet 200 mg 2 (Two) Times a Day.  1   • methotrexate 2.5 MG tablet Take 15 mg by mouth 1 (One) Time Per Week. On    PT TAKES 7.5MG IN MORNING AND .7.5MG IN EVENING ON SUNDAYS     • omeprazole (priLOSEC) 20 MG capsule Take 1 capsule by mouth Daily. 90 capsule 3     No current facility-administered medications on file prior to visit.     Allergies   Allergen Reactions   • Latex Rash   • Tramadol Nausea Only and Other (See Comments)     GI upset   • Wound Dressing Adhesive Other (See Comments)     slight redness at tape site     Past Surgical History:   Procedure Laterality Date   • COLONOSCOPY     • ENDOSCOPY     • ESOPHAGEAL DILATATION     • GROIN HIDRADENITIS EXCISION Right 10/29/2021    Procedure: GROIN HIDRADENITIS EXCISION with excision of right leg mass;  Surgeon: Tanmay Clements MD;  Location: [facility];  Service: General;  Laterality: Right;   • JOINT REPLACEMENT     • LAPAROSCOPIC TUBAL LIGATION     • REPLACEMENT TOTAL KNEE BILATERAL      ,    • SKIN BIOPSY     • SKIN CANCER EXCISION     • TOTAL HIP ARTHROPLASTY  2014    right     Family History   Problem Relation Age of Onset   • Stroke Mother    • Arthritis Mother    • Heart disease Mother    • Lung cancer Father    • Cancer Father    • Hyperlipidemia Sister    • Hypertension Sister      Social History     Socioeconomic History   • Marital status:      Spouse name: [facility]   • Number of children: 2   Tobacco Use   • Smoking status: Former     Packs/day: 1.00     Years: 20.00     Pack years: 20.00     Types: Cigarettes     Quit date: 1983     Years since quittin.8   •  "Smokeless tobacco: Never   Vaping Use   • Vaping Use: Never used   Substance and Sexual Activity   • Alcohol use: Yes     Comment: rare    • Drug use: No   • Sexual activity: Not Currently     Partners: Male     Birth control/protection: Tubal ligation       Physical Examination:  Vital Signs: /74   Ht 175.3 cm (69\")   Wt 103 kg (226 lb)   BMI 33.37 kg/m²     General Appearance: alert, appears stated age, and cooperative  Breasts: Not performed.  Abdomen: no masses, no hepatomegaly, no splenomegaly, soft non-tender, no guarding, and no rebound tenderness  Pelvic: Clinical staff was present for exam  External genitalia:  normal appearance of the external genitalia including Bartholin's and [place]'s glands.  :  urethral meatus normal;  Vaginal:  atrophic mucosal changes are present;  Cervix:  normal appearance.  Uterus:  normal size, shape and consistency.  Adnexa:  non palpable bilaterally.  Cystocele GRADE 2  Rectocele GRADE 2  Uterine GRADE 0  Size #6 incontinence pessary removed, cleaned, and reinserted    Data Review:  The following data was reviewed by: Rafia Owens MD on 11/10/2022:     Labs:    Imaging:    Medical Records:  None    Assessment and Plan   1. Encounter for pessary maintenance  The patient's pessary was removed, cleaned, and reinserted.  Instructions and precautions were given.  The patient is to follow up as discussed.    2. Cystocele, midline  There are no changes noted on examination.  Patient desires to continue with her currently prescribed #6 incontinence dish pessary.  Instructions and precautions have been given.  Patient is to follow-up as discussed.    3. Rectocele  There are no changes noted on examination.  Patient desires to continue with pessary for management of her symptoms.  Patient is to continue again aggressive management of her constipation.    4. Postmenopausal atrophic vaginitis  Patient with continued atrophic changes as noted.  Patient is to continue the use of " her estrogen cream to apply at the vaginal introitus and periurethral area.    5. Difficulty voiding  Patient reports improvement in her difficulty voiding.  She is to continue the use of her estrogen cream as discussed.  Patient desires to continue with pessary as noted.    6. Constipation, unspecified constipation type  Patient is to continue with aggressive management of her constipation.  Patient is also now on baby aspirin.  Instructions and precautions have been given.  Patient is to follow-up as discussed.    7. Urinary incontinence, unspecified type  Patient continues to have urinary incontinence.  She desires to continue with incontinence dish pessary as noted.  She is also to continue the use of her estrogen cream.    8. Pelvic pressure in female  Patient with improvement in the pelvic pressure.  She does report good relief with her pessary.  Patient desires to continue with pessary for management of her symptoms.    Follow Up/Instructions:  Follow up as noted.  Patient was given instructions and counseling regarding her condition or for health maintenance advice. Please see specific information pulled into the AVS if appropriate.     Note: Speech recognition transcription software may have been used to dictate portions of this document.  An attempt at proofreading has been made though minor errors in transcription may still be present.    This note was electronically signed.  Rafia Owens M.D.

## 2023-01-13 ENCOUNTER — OFFICE VISIT (OUTPATIENT)
Dept: NEUROLOGY | Facility: CLINIC | Age: 78
End: 2023-01-13
Payer: MEDICARE

## 2023-01-13 VITALS
HEIGHT: 69 IN | SYSTOLIC BLOOD PRESSURE: 138 MMHG | BODY MASS INDEX: 34.04 KG/M2 | OXYGEN SATURATION: 92 % | DIASTOLIC BLOOD PRESSURE: 74 MMHG | HEART RATE: 74 BPM | WEIGHT: 229.8 LBS | TEMPERATURE: 97.3 F

## 2023-01-13 DIAGNOSIS — R51.9 NONINTRACTABLE HEADACHE, UNSPECIFIED CHRONICITY PATTERN, UNSPECIFIED HEADACHE TYPE: ICD-10-CM

## 2023-01-13 DIAGNOSIS — G45.9 TIA (TRANSIENT ISCHEMIC ATTACK): Primary | ICD-10-CM

## 2023-01-13 PROCEDURE — 99214 OFFICE O/P EST MOD 30 MIN: CPT | Performed by: NURSE PRACTITIONER

## 2023-01-13 RX ORDER — MAGNESIUM OXIDE 400 MG/1
400 TABLET ORAL DAILY
Qty: 30 TABLET | Refills: 3 | Status: SHIPPED | OUTPATIENT
Start: 2023-01-13

## 2023-01-13 RX ORDER — CLOPIDOGREL BISULFATE 75 MG/1
1 TABLET ORAL DAILY
COMMUNITY

## 2023-01-17 NOTE — PROGRESS NOTES
New Patient Office Visit      Encounter Date: 2023   Patient Name: Marisol Muro  : 1945   MRN: [id number]   PCP: Mati Samson MD    Referring Provider: Walt Diamond MD     Chief Complaint:    Chief Complaint   Patient presents with   • Follow-up       History of Present Illness: Marisol Muro is a 77 y.o. female who is here today to establish care.  Patient has a known medical history of hypertension, rheumatoid arthritis, GERD who was seen at [facility] on 2022 by teleneurology provider Dr. Hollingsworth for acute onset with difficulty of walking.  This was also associated with dysarthria.  Patient reported it felt like Novocain had been injected in her mouth.  She denied any upper extremity symptoms, visual changes, facial droop or trouble swallowing.  At the time of this incident patient reported a headache that was mild and dull at the top of the head.  She rated it 4/10 in intensity and said that it had been ongoing since the previous week in some capacity.  She was discharged on aspirin 81 mg and Lipitor 40 mg.  Today she presents to clinic to establish care.  Initially she did not have a follow-up with the stroke clinic but her PCP felt it best for her to be followed by our team.  She has not had any ongoing strokelike symptoms or other episodes.  She is also seen today for complaints of headache.  She reports that she gets these headaches sporadically.  She was recommended to try Depakote by Dr. Mendoza during her admission last year but patient never filled this prescription.  Today we will start her on magnesium to see if we can prevent migraine occurrence.  She has no other questions or concerns at this time    Stroke Risk Factors: hyperlipidemia and hypertension      Subjective      Review of Systems:   Review of Systems   Constitutional: Negative for activity change, appetite change, chills, diaphoresis, fatigue, fever, unexpected weight gain and unexpected weight  loss.   Eyes: Negative for photophobia and visual disturbance.   Respiratory: Negative for cough and shortness of breath.    Cardiovascular: Negative for chest pain and palpitations.   Gastrointestinal: Negative for abdominal pain.   Musculoskeletal: Negative for gait problem.   Neurological: Positive for headache. Negative for dizziness, tremors, seizures, syncope, facial asymmetry, speech difficulty, weakness, light-headedness, numbness, memory problem and confusion.       Past Medical History:   Past Medical History:   Diagnosis Date   • Asymptomatic bacteriuria    • Cataract    • COVID-19 vaccine series completed     Moderna   • Difficulty walking    • Ear piercing    • Esophageal stricture    • GERD (gastroesophageal reflux disease)    • Headache, tension-type    • History of prolapse of bladder    • HL (hearing loss)     no hearing aids   • Hyperlipidemia    • Hypertension    • Impaired fasting glucose    • Obesity    • Osteoarthritis    • Rheumatoid arthritis (HCC)    • Shingles    • Skin cancer     left ankle   • Stroke (HCC) 20   • Vision loss    • Wears glasses        Past Surgical History:   Past Surgical History:   Procedure Laterality Date   • COLONOSCOPY     • ENDOSCOPY     • ESOPHAGEAL DILATATION     • GROIN HIDRADENITIS EXCISION Right 10/29/2021    Procedure: GROIN HIDRADENITIS EXCISION with excision of right leg mass;  Surgeon: Tanmay Clements MD;  Location: [facility];  Service: General;  Laterality: Right;   • JOINT REPLACEMENT     • LAPAROSCOPIC TUBAL LIGATION     • REPLACEMENT TOTAL KNEE BILATERAL      ,    • SKIN BIOPSY     • SKIN CANCER EXCISION     • TOTAL HIP ARTHROPLASTY  2014    right       Family History:   Family History   Problem Relation Age of Onset   • Stroke Mother    • Arthritis Mother    • Heart disease Mother    • Dementia Mother          at 85 yrs. old   • Lung cancer Father    • Cancer Father    • Hyperlipidemia Sister    • Hypertension Sister        Social  History:   Social History     Socioeconomic History   • Marital status:      Spouse name: Ashu   • Number of children: 2   Tobacco Use   • Smoking status: Former     Packs/day: 1.00     Years: 20.00     Pack years: 20.00     Types: Cigarettes     Start date: 1961     Quit date: 1983     Years since quittin.0     Passive exposure: Past   • Smokeless tobacco: Never   Vaping Use   • Vaping Use: Never used   Substance and Sexual Activity   • Alcohol use: Not Currently     Comment: rare    • Drug use: No   • Sexual activity: Not Currently     Partners: Male     Birth control/protection: Tubal ligation       Medications:     Current Outpatient Medications:   •  acetaminophen (TYLENOL) 650 MG 8 hr tablet, Take 1 tablet by mouth 2 (Two) Times a Day As Needed for Mild Pain ., Disp: , Rfl:   •  acyclovir (ZOVIRAX) 400 MG tablet, Take 1 tablet by mouth Every 4 (Four) Hours While Awake. Take no more than 5 doses a day. (Patient taking differently: Take 400 mg by mouth Daily As Needed. Take no more than 5 doses a day.), Disp: 50 tablet, Rfl: 3  •  aspirin 81 MG EC tablet, Take 1 tablet by mouth Daily., Disp: 30 tablet, Rfl: 0  •  atorvastatin (LIPITOR) 40 MG tablet, Take 1 tablet by mouth Every Night., Disp: 90 tablet, Rfl: 3  •  Cholecalciferol 50 MCG (2000 UT) capsule, Take 2,000 Units by mouth., Disp: , Rfl:   •  clopidogrel (PLAVIX) 75 MG tablet, Take 1 tablet by mouth Daily., Disp: , Rfl:   •  Diclofenac Sodium (VOLTAREN) 1 % gel gel, Apply 2 g topically to the appropriate area as directed Every 12 (Twelve) Hours., Disp: , Rfl:   •  estradiol (ESTRACE) 0.1 MG/GM vaginal cream, 0.5  grams intravaginal 2x/week, Disp: 30 g, Rfl: 11  •  folic acid (FOLVITE) 1 MG tablet, Take 1 mg by mouth Daily., Disp: , Rfl: 0  •  hydroCHLOROthiazide (HYDRODIURIL) 12.5 MG tablet, Take 1 tablet by mouth Daily., Disp: 90 tablet, Rfl: 3  •  hydroxychloroquine (PLAQUENIL) 200 MG tablet, 200 mg 2 (Two) Times a Day., Disp: , Rfl:  "1  •  methotrexate 2.5 MG tablet, Take 15 mg by mouth 1 (One) Time Per Week. On Sunday   PT TAKES 7.5MG IN MORNING AND .7.5MG IN EVENING ON SUNDAYS, Disp: , Rfl:   •  omeprazole (priLOSEC) 20 MG capsule, Take 1 capsule by mouth Daily., Disp: 90 capsule, Rfl: 3  •  magnesium oxide (MAG-OX) 400 MG tablet, Take 1 tablet by mouth Daily., Disp: 30 tablet, Rfl: 3    Allergies:   Allergies   Allergen Reactions   • Latex Rash   • Tramadol Nausea Only and Other (See Comments)     GI upset   • Wound Dressing Adhesive Other (See Comments)     slight redness at tape site       Objective     Physical Exam:  Vital Signs:   Vitals:    01/13/23 1403   BP: 138/74   Pulse: 74   Temp: 97.3 °F (36.3 °C)   SpO2: 92%   Weight: 104 kg (229 lb 12.8 oz)   Height: 175.3 cm (69.02\")     Body mass index is 33.92 kg/m².     Physical Exam  Vitals and nursing note reviewed.   Constitutional:       General: She is not in acute distress.     Appearance: Normal appearance. She is normal weight. She is not ill-appearing.   HENT:      Head: Normocephalic and atraumatic.      Nose: Nose normal.      Mouth/Throat:      Mouth: Mucous membranes are moist.   Eyes:      Extraocular Movements: Extraocular movements intact.      Pupils: Pupils are equal, round, and reactive to light.   Cardiovascular:      Rate and Rhythm: Normal rate and regular rhythm.      Pulses: Normal pulses.   Pulmonary:      Effort: Pulmonary effort is normal. No respiratory distress.   Skin:     General: Skin is warm and dry.   Neurological:      General: No focal deficit present.      Mental Status: She is alert and oriented to person, place, and time.   Psychiatric:         Mood and Affect: Mood normal.         Behavior: Behavior normal.       NIH 0    Modified Bladen Score: 0        0  No Symptoms    1 No significant disability. Able to carry out all usual activities, despite some symptoms.    2 Slight disability. Able to look after own affairs without assistance, but unable to " carry out all previous activities.    3 Moderate disability. Requires some help, but able to walk unassisted.    4 Moderately severe disability. Unable to attend to own bodily needs without assistance, and unable to walk unassisted.    5 Severe disability. Requires constant nursing care and attention, bedridden, incontinent.    6 Dead         PHQ-9 Depression Screening  Little interest or pleasure in doing things? 0-->not at all   Feeling down, depressed, or hopeless? 0-->not at all   Trouble falling or staying asleep, or sleeping too much?     Feeling tired or having little energy?     Poor appetite or overeating?     Feeling bad about yourself - or that you are a failure or have let yourself or your family down?     Trouble concentrating on things, such as reading the newspaper or watching television?     Moving or speaking so slowly that other people could have noticed? Or the opposite - being so fidgety or restless that you have been moving around a lot more than usual?     Thoughts that you would be better off dead, or of hurting yourself in some way?     PHQ-9 Total Score 0   If you checked off any problems, how difficult have these problems made it for you to do your work, take care of things at home, or get along with other people?             Results Reviewed:   CT head shows no acute changes, diffuse atrophy of the brain, especially in the frontal region.  Large bilateral central sulcus.  CT angiogram of head and neck shows no significant stenosis or occlusion.  MRI brain is negative for any acute finding.  TTE- EF 60-65%. Severely increased left atrial volume index.   Her A1c is 5.5, LDL 77, total cholesterol 171 and triglyceride of 53.         Assessment / Plan      Assessment/Plan:   Diagnoses and all orders for this visit:    1. TIA (transient ischemic attack) (Primary)  -continue ASA 81 mg   -continue Lipitor 40 mg   -cardiac event monitor ordered due to left atrial enlargement on TTE  -no rehab needs    -follow up in stroke clinic in 3 months     2. Nonintractable headache, unspecified chronicity pattern, unspecified headache type  -initiate Magnesium 400 mg daily for headache prevention   -Dr. Mendoza had prescribed patient Depakote 50 mg BID but she never took this and does not wish to. We can start with the above plan and make modifications as needed      3. HTN  -normal blood pressure goals, <130/80   -today 138/74      Discussed the importance of medication compliance and lifestyle modifications (adequate blood pressure control, adequate control of hyperlipidemia, adequate glycemic control, increase physical activity, and healthy diet) to help reduce the risk of future cerebrovascular events.  Also discussed the signs symptoms that would warrant the patient return back to the emergency department including unilateral weakness, unilateral numbness, visual disturbances, loss of balance, speech difficulties, and/or a sudden severe headache.      Follow Up:   Return in about 3 months (around 4/13/2023).    HARSHA Desai  [facility]

## 2023-02-01 ENCOUNTER — OFFICE VISIT (OUTPATIENT)
Dept: OBSTETRICS AND GYNECOLOGY | Facility: CLINIC | Age: 78
End: 2023-02-01
Payer: MEDICARE

## 2023-02-01 VITALS
HEIGHT: 69 IN | BODY MASS INDEX: 33.77 KG/M2 | DIASTOLIC BLOOD PRESSURE: 80 MMHG | WEIGHT: 228 LBS | SYSTOLIC BLOOD PRESSURE: 138 MMHG

## 2023-02-01 DIAGNOSIS — R32 URINARY INCONTINENCE, UNSPECIFIED TYPE: ICD-10-CM

## 2023-02-01 DIAGNOSIS — N95.2 POSTMENOPAUSAL ATROPHIC VAGINITIS: ICD-10-CM

## 2023-02-01 DIAGNOSIS — G45.9 TIA (TRANSIENT ISCHEMIC ATTACK): ICD-10-CM

## 2023-02-01 DIAGNOSIS — N81.6 RECTOCELE: ICD-10-CM

## 2023-02-01 DIAGNOSIS — Z46.89 ENCOUNTER FOR PESSARY MAINTENANCE: Primary | ICD-10-CM

## 2023-02-01 DIAGNOSIS — R39.9 URINARY TRACT INFECTION SYMPTOMS: ICD-10-CM

## 2023-02-01 DIAGNOSIS — R30.0 DYSURIA: ICD-10-CM

## 2023-02-01 DIAGNOSIS — N81.11 CYSTOCELE, MIDLINE: ICD-10-CM

## 2023-02-01 DIAGNOSIS — I51.7 LEFT ATRIAL ENLARGEMENT: ICD-10-CM

## 2023-02-01 DIAGNOSIS — K59.00 CONSTIPATION, UNSPECIFIED CONSTIPATION TYPE: ICD-10-CM

## 2023-02-01 PROCEDURE — 99214 OFFICE O/P EST MOD 30 MIN: CPT | Performed by: OBSTETRICS & GYNECOLOGY

## 2023-02-01 RX ORDER — NITROFURANTOIN 25; 75 MG/1; MG/1
100 CAPSULE ORAL 2 TIMES DAILY
Qty: 14 CAPSULE | Refills: 0 | Status: SHIPPED | OUTPATIENT
Start: 2023-02-01 | End: 2023-02-08

## 2023-02-02 NOTE — PROGRESS NOTES
Chief Complaint  Pessary Check (Patient complains of urinary incontinence. /)     History of Present Illness:  Patient is 78 y.o.  who presents to [facility] here for follow-up evaluation of her prolapse as well as pessary.  Patient does report having worsening of her urinary incontinence.  Patient does feel like at times she is having tissue prolapse around the pessary.  She reports the pessary has stayed in place.  She has had relief of her pressure and bulge per vagina.  She denies any vaginal bleeding or spotting.  Patient has continued on her Plavix and baby aspirin.  She did see the neurologist.  It is recommended that patient see cardiology and have a Holter monitor in regards to her left atrial enlargement noted at the time of her recent TIA evaluation.  Patient denies any changes in her bowel movements.  She does report having discomfort and dysuria with urination however.  She denies any flank pain, fever, or chills.    History  Past Medical History:   Diagnosis Date   • Asymptomatic bacteriuria    • Cataract    • COVID-19 vaccine series completed     Moderna   • Difficulty walking    • Ear piercing    • Esophageal stricture    • GERD (gastroesophageal reflux disease)    • Headache, tension-type    • History of prolapse of bladder    • HL (hearing loss)     no hearing aids   • Hyperlipidemia    • Hypertension    • Impaired fasting glucose    • Obesity    • Osteoarthritis    • Rheumatoid arthritis (HCC)    • Shingles    • Skin cancer     left ankle   • Stroke (HCC) 22   • Vision loss    • Wears glasses      Current Outpatient Medications on File Prior to Visit   Medication Sig Dispense Refill   • acetaminophen (TYLENOL) 650 MG 8 hr tablet Take 1 tablet by mouth 2 (Two) Times a Day As Needed for Mild Pain .     • acyclovir (ZOVIRAX) 400 MG tablet Take 1 tablet by mouth Every 4 (Four) Hours While Awake. Take no more than 5 doses a day. (Patient taking differently: Take 400  mg by mouth Daily As Needed. Take no more than 5 doses a day.) 50 tablet 3   • aspirin 81 MG EC tablet Take 1 tablet by mouth Daily. 30 tablet 0   • atorvastatin (LIPITOR) 40 MG tablet Take 1 tablet by mouth Every Night. 90 tablet 3   • Cholecalciferol 50 MCG (2000 UT) capsule Take 2,000 Units by mouth.     • clopidogrel (PLAVIX) 75 MG tablet Take 1 tablet by mouth Daily.     • Diclofenac Sodium (VOLTAREN) 1 % gel gel Apply 2 g topically to the appropriate area as directed Every 12 (Twelve) Hours.     • estradiol (ESTRACE) 0.1 MG/GM vaginal cream 0.5  grams intravaginal 2x/week 30 g 11   • folic acid (FOLVITE) 1 MG tablet Take 1 mg by mouth Daily.  0   • hydroCHLOROthiazide (HYDRODIURIL) 12.5 MG tablet Take 1 tablet by mouth Daily. 90 tablet 3   • hydroxychloroquine (PLAQUENIL) 200 MG tablet 200 mg 2 (Two) Times a Day.  1   • magnesium oxide (MAG-OX) 400 MG tablet Take 1 tablet by mouth Daily. 30 tablet 3   • methotrexate 2.5 MG tablet Take 15 mg by mouth 1 (One) Time Per Week. On Sunday   PT TAKES 7.5MG IN MORNING AND .7.5MG IN EVENING ON SUNDAYS     • omeprazole (priLOSEC) 20 MG capsule Take 1 capsule by mouth Daily. 90 capsule 3     No current facility-administered medications on file prior to visit.     Allergies   Allergen Reactions   • Latex Rash   • Tramadol Nausea Only and Other (See Comments)     GI upset   • Wound Dressing Adhesive Other (See Comments)     slight redness at tape site     Past Surgical History:   Procedure Laterality Date   • COLONOSCOPY     • ENDOSCOPY     • ESOPHAGEAL DILATATION     • GROIN HIDRADENITIS EXCISION Right 10/29/2021    Procedure: GROIN HIDRADENITIS EXCISION with excision of right leg mass;  Surgeon: Tanmay Clements MD;  Location: [facility];  Service: General;  Laterality: Right;   • JOINT REPLACEMENT     • LAPAROSCOPIC TUBAL LIGATION     • REPLACEMENT TOTAL KNEE BILATERAL      2004, 2008   • SKIN BIOPSY     • SKIN CANCER EXCISION     • TOTAL HIP ARTHROPLASTY  2014    right  "    Family History   Problem Relation Age of Onset   • Stroke Mother    • Arthritis Mother    • Heart disease Mother    • Dementia Mother          at 85 yrs. old   • Lung cancer Father    • Cancer Father    • Hyperlipidemia Sister    • Hypertension Sister      Social History     Socioeconomic History   • Marital status:      Spouse name: Ashu   • Number of children: 2   Tobacco Use   • Smoking status: Former     Packs/day: 1.00     Years: 20.00     Pack years: 20.00     Types: Cigarettes     Start date: 1961     Quit date: 1983     Years since quittin.1     Passive exposure: Past   • Smokeless tobacco: Never   Vaping Use   • Vaping Use: Never used   Substance and Sexual Activity   • Alcohol use: Not Currently     Comment: rare    • Drug use: No   • Sexual activity: Not Currently     Partners: Male     Birth control/protection: Tubal ligation       Physical Examination:  Vital Signs: /80   Ht 175.3 cm (69\")   Wt 103 kg (228 lb)   BMI 33.67 kg/m²     General Appearance: alert, appears stated age, and cooperative  Breasts: Not performed.  Abdomen: no masses, no hepatomegaly, no splenomegaly, soft non-tender, no guarding, and no rebound tenderness  Pelvic: Clinical staff was present for exam  External genitalia:  normal appearance of the external genitalia including Bartholin's and [place]'s glands.  :  urethral meatus normal;  Vaginal:  atrophic mucosal changes are present;  Cervix:  normal appearance.  Uterus:  normal size, shape and consistency.  Adnexa:  non palpable bilaterally.  Cystocele GRADE 2  Rectocele GRADE 2  Uterine GRADE 2  Pessary was removed, cleaned, and reinserted without difficulty    Data Review:  The following data was reviewed by: Rafia Owens MD on 2023:     Labs:    Imaging:    Medical Records:  Progress Notes by Adrienne Mcqueen APRN (2023 14:00)      Assessment and Plan   1. Rectocele  There are no changes noted on examination.  Patient desires " to continue with her currently prescribed pessary.  Patient is instructed to keep stools soft and no straining with defecation.    2. Cystocele, midline  There are no changes noted on examination.  Patient desires to continue with pessary for management of her symptoms.    3. Encounter for pessary maintenance  The patient's pessary was removed, cleaned, and reinserted.  Instructions and precautions were given.  The patient is to follow up as discussed.    4. Urinary tract infection symptoms  We will send urine for clean-catch UA culture and sensitivity.  - nitrofurantoin, macrocrystal-monohydrate, (Macrobid) 100 MG capsule; Take 1 capsule by mouth 2 (Two) Times a Day for 7 days.  Dispense: 14 capsule; Refill: 0  - Urinalysis With Microscopic - Urine, Clean Catch  - Urine Culture - Urine, Urine, Clean Catch    5. Postmenopausal atrophic vaginitis  Patient with continued atrophic changes.  She is to continue the use of her estrogen cream.    6. Constipation, unspecified constipation type  I have stressed to the patient the need for aggressive management of her constipation.  Patient is instructed in the use of MiraLAX and Metamucil.  Instructions and precautions have been given.    7. Urinary incontinence, unspecified type  Patient with worsening of her urinary incontinence.  We will send urine for clean-catch UA culture and sensitivity.  Patient is to continue with her incontinence dish pessary as noted.    8. TIA (transient ischemic attack)  Patient is requesting to be seen by cardiology here.  She did recently see neurology as noted.  Referral was placed to cardiology per patient request.  - Ambulatory Referral to Cardiology    9. Left atrial enlargement  Referral to cardiology as noted.  Patient is requesting to see Dr. Neely.  - Ambulatory Referral to Cardiology    10. Dysuria  Prescription is given for Macrobid as noted.  Patient is to call for her urine culture results.  - nitrofurantoin,  macrocrystal-monohydrate, (Macrobid) 100 MG capsule; Take 1 capsule by mouth 2 (Two) Times a Day for 7 days.  Dispense: 14 capsule; Refill: 0  - Urinalysis With Microscopic - Urine, Clean Catch  - Urine Culture - Urine, Urine, Clean Catch    Follow Up/Instructions:  Follow up as noted.  Patient was given instructions and counseling regarding her condition or for health maintenance advice. Please see specific information pulled into the AVS if appropriate.     Note: Speech recognition transcription software may have been used to dictate portions of this document.  An attempt at proofreading has been made though minor errors in transcription may still be present.    This note was electronically signed.  Rafia Owens M.D.

## 2023-02-05 LAB
APPEARANCE UR: CLEAR
BACTERIA #/AREA URNS HPF: ABNORMAL /HPF
BACTERIA UR CULT: ABNORMAL
BACTERIA UR CULT: ABNORMAL
BILIRUB UR QL STRIP: NEGATIVE
CASTS URNS MICRO: ABNORMAL
COLOR UR: YELLOW
EPI CELLS #/AREA URNS HPF: ABNORMAL /HPF
GLUCOSE UR QL STRIP: NEGATIVE
HGB UR QL STRIP: NEGATIVE
KETONES UR QL STRIP: NEGATIVE
LEUKOCYTE ESTERASE UR QL STRIP: ABNORMAL
NITRITE UR QL STRIP: NEGATIVE
OTHER ANTIBIOTIC SUSC ISLT: ABNORMAL
PH UR STRIP: 6.5 [PH] (ref 5–8)
PROT UR QL STRIP: NEGATIVE
RBC #/AREA URNS HPF: ABNORMAL /HPF
SP GR UR STRIP: 1.01 (ref 1–1.03)
UROBILINOGEN UR STRIP-MCNC: ABNORMAL MG/DL
WBC #/AREA URNS HPF: ABNORMAL /HPF

## 2023-02-06 RX ORDER — SULFAMETHOXAZOLE AND TRIMETHOPRIM 800; 160 MG/1; MG/1
1 TABLET ORAL 2 TIMES DAILY
Qty: 14 TABLET | Refills: 0 | Status: SHIPPED | OUTPATIENT
Start: 2023-02-06 | End: 2023-02-13

## 2023-03-02 ENCOUNTER — OFFICE VISIT (OUTPATIENT)
Dept: CARDIOLOGY | Facility: CLINIC | Age: 78
End: 2023-03-02
Payer: MEDICARE

## 2023-03-02 VITALS
DIASTOLIC BLOOD PRESSURE: 78 MMHG | RESPIRATION RATE: 18 BRPM | WEIGHT: 230 LBS | BODY MASS INDEX: 34.07 KG/M2 | SYSTOLIC BLOOD PRESSURE: 128 MMHG | HEIGHT: 69 IN | HEART RATE: 87 BPM | OXYGEN SATURATION: 95 %

## 2023-03-02 DIAGNOSIS — I10 ESSENTIAL HYPERTENSION: ICD-10-CM

## 2023-03-02 DIAGNOSIS — G45.9 TIA (TRANSIENT ISCHEMIC ATTACK): Primary | ICD-10-CM

## 2023-03-02 DIAGNOSIS — E66.9 OBESITY (BMI 30-39.9): ICD-10-CM

## 2023-03-02 PROCEDURE — 99204 OFFICE O/P NEW MOD 45 MIN: CPT | Performed by: INTERNAL MEDICINE

## 2023-03-02 PROCEDURE — 93000 ELECTROCARDIOGRAM COMPLETE: CPT | Performed by: INTERNAL MEDICINE

## 2023-03-02 NOTE — PROGRESS NOTES
[facility] Cardiology OP Consult Note    Marisol Muro  [id number]  2023    Referred By: Rafia Owens MD    Chief Complaint: History of TIA    History of Present Illness:   Mrs. Marisol Muro is a 78 y.o. female who presents to the Cardiology Clinic for consideration of outpatient cardiac monitoring.  The patient has a past medical history significant for hypertension, hyperlipidemia, rheumatoid arthritis, and obesity with a BMI 33.  Her recent medical history includes a TIA in  at which time she presented with difficulty walking and dysarthria.  An MRI at that time did not show any acute CVA.  She presents today for consideration of outpatient cardiac monitoring.  The patient denies any significant past cardiac history.  She denies any history of palpitations.  No presyncopal or syncopal events.  She reports complete resolution of her TIA-like symptoms.  She denies exertional chest pain or angina.  No other specific complaints today.    Past Cardiac Testin. Last Coronary Angio: None  2. Prior Stress Testing: None  3. Last Echo:    1.  Normal left ventricular size and systolic function, LVEF 60-65%.   2.  Indeterminate LV diastolic filling pattern.   3.  Normal right ventricular size and systolic function.   4.  Severely increased left atrial volume index.   5.  Mild mitral regurgitation.   6.  Mild pulmonic regurgitation.  4. Prior Holter Monitor: None    Review of Systems:   Review of Systems   Constitutional: Negative for activity change, appetite change, chills, diaphoresis, fatigue, fever, unexpected weight gain and unexpected weight loss.   Eyes: Negative for blurred vision and double vision.   Respiratory: Negative for cough, chest tightness, shortness of breath and wheezing.    Cardiovascular: Negative for chest pain, palpitations and leg swelling.   Gastrointestinal: Negative for abdominal pain, anal bleeding, blood in stool and GERD.   Endocrine: Negative for  cold intolerance and heat intolerance.   Genitourinary: Negative for hematuria.   Neurological: Negative for dizziness, syncope, weakness and light-headedness.   Hematological: Does not bruise/bleed easily.   Psychiatric/Behavioral: Negative for depressed mood and stress. The patient is not nervous/anxious.        Past Medical History:   Past Medical History:   Diagnosis Date   • Asymptomatic bacteriuria    • Cataract    • COVID-19 vaccine series completed     Moderna   • Difficulty walking    • Ear piercing    • Esophageal stricture    • GERD (gastroesophageal reflux disease)    • Headache, tension-type    • History of prolapse of bladder    • HL (hearing loss)     no hearing aids   • Hyperlipidemia    • Hypertension    • Impaired fasting glucose    • Obesity    • Osteoarthritis    • Rheumatoid arthritis (HCC)    • Shingles    • Skin cancer     left ankle   • Stroke (HCC) 20   • Vision loss    • Wears glasses        Past Surgical History:   Past Surgical History:   Procedure Laterality Date   • COLONOSCOPY     • ENDOSCOPY     • ESOPHAGEAL DILATATION     • GROIN HIDRADENITIS EXCISION Right 10/29/2021    Procedure: GROIN HIDRADENITIS EXCISION with excision of right leg mass;  Surgeon: Tanmay Clements MD;  Location: [facility];  Service: General;  Laterality: Right;   • JOINT REPLACEMENT     • LAPAROSCOPIC TUBAL LIGATION     • REPLACEMENT TOTAL KNEE BILATERAL      ,    • SKIN BIOPSY     • SKIN CANCER EXCISION     • TOTAL HIP ARTHROPLASTY  2014    right       Family History:   Family History   Problem Relation Age of Onset   • Stroke Mother    • Arthritis Mother    • Heart disease Mother    • Dementia Mother          at 85 yrs. old   • Lung cancer Father    • Cancer Father    • Hyperlipidemia Sister    • Hypertension Sister        Social History:   Social History     Socioeconomic History   • Marital status:      Spouse name: Ashu   • Number of children: 2   Tobacco Use   • Smoking status: Former      Packs/day: 1.00     Years: 20.00     Pack years: 20.00     Types: Cigarettes     Start date: 1961     Quit date: 1983     Years since quittin.1     Passive exposure: Past   • Smokeless tobacco: Never   Vaping Use   • Vaping Use: Never used   Substance and Sexual Activity   • Alcohol use: Not Currently     Comment: rare    • Drug use: No   • Sexual activity: Not Currently     Partners: Male     Birth control/protection: Tubal ligation       Medications:     Current Outpatient Medications:   •  acetaminophen (TYLENOL) 650 MG 8 hr tablet, Take 1 tablet by mouth 2 (Two) Times a Day As Needed for Mild Pain ., Disp: , Rfl:   •  acyclovir (ZOVIRAX) 400 MG tablet, Take 1 tablet by mouth Every 4 (Four) Hours While Awake. Take no more than 5 doses a day. (Patient taking differently: Take 1 tablet by mouth Daily As Needed. Take no more than 5 doses a day.), Disp: 50 tablet, Rfl: 3  •  aspirin 81 MG EC tablet, Take 1 tablet by mouth Daily., Disp: 30 tablet, Rfl: 0  •  atorvastatin (LIPITOR) 40 MG tablet, Take 1 tablet by mouth Every Night., Disp: 90 tablet, Rfl: 3  •  Cholecalciferol 50 MCG (2000 UT) capsule, Take 1 capsule by mouth., Disp: , Rfl:   •  clopidogrel (PLAVIX) 75 MG tablet, Take 1 tablet by mouth Daily., Disp: , Rfl:   •  Diclofenac Sodium (VOLTAREN) 1 % gel gel, Apply 2 g topically to the appropriate area as directed Every 12 (Twelve) Hours., Disp: , Rfl:   •  estradiol (ESTRACE) 0.1 MG/GM vaginal cream, 0.5  grams intravaginal 2x/week, Disp: 30 g, Rfl: 11  •  folic acid (FOLVITE) 1 MG tablet, Take 1 tablet by mouth Daily., Disp: , Rfl: 0  •  hydroCHLOROthiazide (HYDRODIURIL) 12.5 MG tablet, Take 1 tablet by mouth Daily., Disp: 90 tablet, Rfl: 3  •  hydroxychloroquine (PLAQUENIL) 200 MG tablet, 1 tablet 2 (Two) Times a Day., Disp: , Rfl: 1  •  methotrexate 2.5 MG tablet, Take 6 tablets by mouth 1 (One) Time Per Week. On    PT TAKES 7.5MG IN MORNING AND .7.5MG IN EVENING ON SUNDAYS, Disp: ,  "Rfl:   •  omeprazole (priLOSEC) 20 MG capsule, Take 1 capsule by mouth Daily., Disp: 90 capsule, Rfl: 3  •  magnesium oxide (MAG-OX) 400 MG tablet, Take 1 tablet by mouth Daily., Disp: 30 tablet, Rfl: 3    Allergies:   Allergies   Allergen Reactions   • Latex Rash   • Tramadol Nausea Only and Other (See Comments)     GI upset   • Wound Dressing Adhesive Other (See Comments)     slight redness at tape site       Physical Exam:  Vital Signs:   Vitals:    03/02/23 1520   BP: 128/78   BP Location: Left arm   Patient Position: Sitting   Pulse: 87   Resp: 18   SpO2: 95%   Weight: 104 kg (230 lb)   Height: 175.3 cm (69\")       Physical Exam  Constitutional:       General: She is not in acute distress.     Appearance: She is well-developed. She is obese. She is not diaphoretic.   HENT:      Head: Normocephalic and atraumatic.   Eyes:      General: No scleral icterus.     Pupils: Pupils are equal, round, and reactive to light.   Neck:      Trachea: No tracheal deviation.   Cardiovascular:      Rate and Rhythm: Normal rate and regular rhythm.      Heart sounds: Normal heart sounds. No murmur heard.    No friction rub. No gallop.      Comments: Normal JVD.  Pulmonary:      Effort: Pulmonary effort is normal. No respiratory distress.      Breath sounds: Normal breath sounds. No stridor. No wheezing or rales.   Chest:      Chest wall: No tenderness.   Abdominal:      General: Bowel sounds are normal. There is no distension.      Palpations: Abdomen is soft.      Tenderness: There is no abdominal tenderness. There is no guarding or rebound.   Musculoskeletal:         General: No swelling. Normal range of motion.      Cervical back: Neck supple. No tenderness.   Lymphadenopathy:      Cervical: No cervical adenopathy.   Skin:     General: Skin is warm and dry.      Findings: No erythema.   Neurological:      General: No focal deficit present.      Mental Status: She is alert and oriented to person, place, and time.   Psychiatric:   "       Mood and Affect: Mood normal.         Behavior: Behavior normal.         Results Review:   I reviewed the patient's new clinical results.  I personally viewed and interpreted the patient's EKG/Telemetry data      ECG 12 Lead    Date/Time: 3/2/2023 3:48 PM  Performed by: Michael Neely MD  Authorized by: Michael Neely MD   Comparison: not compared with previous ECG   Rhythm: sinus rhythm  Rate: normal  QRS axis: normal  Other findings: non-specific ST-T wave changes  Clinical impression comment: Borderline ECG              Assessment / Plan:     1. TIA (transient ischemic attack)   -- History of TIA in 8/22 presenting with difficulty walking and dysarthria  -- MRI without evidence of acute CVA at that time  -- Echocardiogram completed, normal LV systolic function without significant underlying valvular abnormalities  -- ECG today shows sinus rhythm  -- Will proceed with 30-day MCOT monitoring to further evaluate for paroxysmal atrial fibrillation  -- Pending results of outpatient cardiac monitoring, will discuss loop recorder  -- Follow-up in 2 months to review results of testing    2. Essential hypertension  -- BP adequately controlled    3. Obesity (BMI 30-39.9)  -- Weight loss advised      Follow Up:   Return in about 2 months (around 5/2/2023).      Thank you for allowing me to participate in the care of your patient. Please to not hesitate to contact me with additional questions or concerns.     JOSE CARLOS Neely MD  Interventional Cardiology   03/02/2023  15:20 EST

## 2023-04-27 ENCOUNTER — OFFICE VISIT (OUTPATIENT)
Dept: INTERNAL MEDICINE | Facility: CLINIC | Age: 78
End: 2023-04-27
Payer: MEDICARE

## 2023-04-27 VITALS
HEIGHT: 69 IN | DIASTOLIC BLOOD PRESSURE: 64 MMHG | SYSTOLIC BLOOD PRESSURE: 130 MMHG | BODY MASS INDEX: 33.77 KG/M2 | OXYGEN SATURATION: 96 % | TEMPERATURE: 97.1 F | WEIGHT: 228 LBS | HEART RATE: 71 BPM

## 2023-04-27 DIAGNOSIS — I10 ESSENTIAL HYPERTENSION: ICD-10-CM

## 2023-04-27 DIAGNOSIS — G45.9 TIA (TRANSIENT ISCHEMIC ATTACK): Primary | ICD-10-CM

## 2023-04-27 DIAGNOSIS — M06.9 RHEUMATOID ARTHRITIS, INVOLVING UNSPECIFIED SITE, UNSPECIFIED WHETHER RHEUMATOID FACTOR PRESENT: ICD-10-CM

## 2023-04-27 NOTE — PROGRESS NOTES
Answers for HPI/ROS submitted by the patient on 4/22/2023  Please describe your symptoms.: Headache  Have you had these symptoms before?: Yes  How long have you been having these symptoms?: 1-4 days  Please list any medications you are currently taking for this condition.: Medications are listed on My Chart  Please describe any probable cause for these symptoms. : Suspected TIA  What is the primary reason for your visit?: Other    Subjective  Marisol Muro is a 78 y.o. female    HPI coming in for follow-up patient with a history of TIA for which she is on dual antiplatelet therapy and a statin has reasonably good BP control history of multiple joint pains thought to be secondary to rheumatoid arthritis currently pain-free.  Does not use tobacco    The following portions of the patient's history were reviewed and updated as appropriate: allergies, current medications, past family history, past medical history, past social history, past surgical history, and problem list.     Review of Systems   Constitutional: Positive for fatigue. Negative for activity change, appetite change and fever.   HENT: Negative for congestion, ear discharge, ear pain and trouble swallowing.    Eyes: Negative for photophobia and visual disturbance.   Respiratory: Negative for cough and shortness of breath.    Cardiovascular: Negative for chest pain and palpitations.   Gastrointestinal: Negative for abdominal distention, abdominal pain, constipation, diarrhea, nausea and vomiting.   Endocrine: Negative.    Genitourinary: Negative for dysuria, hematuria and urgency.   Musculoskeletal: Positive for arthralgias. Negative for back pain, joint swelling and myalgias.   Skin: Negative for color change and rash.   Allergic/Immunologic: Negative.    Neurological: Negative for dizziness, weakness, light-headedness and headaches.   Hematological: Negative for adenopathy. Does not bruise/bleed easily.   Psychiatric/Behavioral: Negative for agitation,  "confusion and dysphoric mood. The patient is not nervous/anxious.        Visit Vitals  /64   Pulse 71   Temp 97.1 °F (36.2 °C)   Ht 175.3 cm (69\")   Wt 103 kg (228 lb)   SpO2 96%   BMI 33.67 kg/m²       Objective  Physical Exam  Constitutional:       General: She is not in acute distress.     Appearance: She is well-developed.   HENT:      Nose: Nose normal.   Eyes:      General: No scleral icterus.     Conjunctiva/sclera: Conjunctivae normal.   Neck:      Thyroid: No thyromegaly.      Trachea: No tracheal deviation.   Cardiovascular:      Rate and Rhythm: Normal rate and regular rhythm.      Heart sounds: No murmur heard.    No friction rub.   Pulmonary:      Effort: No respiratory distress.      Breath sounds: No wheezing or rales.   Abdominal:      General: There is no distension.      Palpations: Abdomen is soft. There is no mass.      Tenderness: There is no abdominal tenderness. There is no guarding.   Musculoskeletal:         General: Deformity present. Normal range of motion.      Right lower leg: Edema present.      Left lower leg: Edema present.   Lymphadenopathy:      Cervical: No cervical adenopathy.   Skin:     General: Skin is warm and dry.      Findings: No erythema or rash.   Neurological:      Mental Status: She is alert and oriented to person, place, and time.      Cranial Nerves: No cranial nerve deficit.      Coordination: Coordination normal.      Deep Tendon Reflexes: Reflexes are normal and symmetric.   Psychiatric:         Behavior: Behavior normal.         Thought Content: Thought content normal.         Judgment: Judgment normal.         Diagnoses and all orders for this visit:    TIA (transient ischemic attack) stable BP control okay continue with current meds no new symptoms    Rheumatoid arthritis, involving unspecified site, unspecified whether rheumatoid factor present stable discussed weight loss exercise program.  On Plaquenil, methotrexate    Essential discussed low-sodium " diet cardiovascular exercise

## 2023-05-04 ENCOUNTER — OFFICE VISIT (OUTPATIENT)
Dept: OBSTETRICS AND GYNECOLOGY | Facility: CLINIC | Age: 78
End: 2023-05-04
Payer: MEDICARE

## 2023-05-04 VITALS
WEIGHT: 229 LBS | SYSTOLIC BLOOD PRESSURE: 134 MMHG | HEIGHT: 69 IN | DIASTOLIC BLOOD PRESSURE: 76 MMHG | BODY MASS INDEX: 33.92 KG/M2

## 2023-05-04 DIAGNOSIS — N81.6 RECTOCELE: ICD-10-CM

## 2023-05-04 DIAGNOSIS — Z46.89 ENCOUNTER FOR PESSARY MAINTENANCE: Primary | ICD-10-CM

## 2023-05-04 DIAGNOSIS — R39.9 URINARY TRACT INFECTION SYMPTOMS: ICD-10-CM

## 2023-05-04 DIAGNOSIS — R32 URINARY INCONTINENCE, UNSPECIFIED TYPE: ICD-10-CM

## 2023-05-04 DIAGNOSIS — N81.4 UTERINE PROLAPSE: ICD-10-CM

## 2023-05-04 DIAGNOSIS — N81.11 CYSTOCELE, MIDLINE: ICD-10-CM

## 2023-05-04 DIAGNOSIS — N95.2 POSTMENOPAUSAL ATROPHIC VAGINITIS: ICD-10-CM

## 2023-05-04 DIAGNOSIS — R10.2 VAGINAL PAIN: ICD-10-CM

## 2023-05-04 DIAGNOSIS — R30.0 BURNING WITH URINATION: ICD-10-CM

## 2023-05-04 RX ORDER — SULFAMETHOXAZOLE AND TRIMETHOPRIM 800; 160 MG/1; MG/1
1 TABLET ORAL 2 TIMES DAILY
Qty: 14 TABLET | Refills: 0 | Status: SHIPPED | OUTPATIENT
Start: 2023-05-04 | End: 2023-05-11

## 2023-05-06 LAB
APPEARANCE UR: ABNORMAL
BACTERIA #/AREA URNS HPF: ABNORMAL /HPF
BACTERIA UR CULT: NORMAL
BACTERIA UR CULT: NORMAL
BILIRUB UR QL STRIP: NEGATIVE
CASTS URNS MICRO: ABNORMAL
COLOR UR: YELLOW
EPI CELLS #/AREA URNS HPF: ABNORMAL /HPF
GLUCOSE UR QL STRIP: NEGATIVE
HGB UR QL STRIP: ABNORMAL
KETONES UR QL STRIP: ABNORMAL
LEUKOCYTE ESTERASE UR QL STRIP: ABNORMAL
NITRITE UR QL STRIP: NEGATIVE
PH UR STRIP: 6 [PH] (ref 5–8)
PROT UR QL STRIP: ABNORMAL
RBC #/AREA URNS HPF: ABNORMAL /HPF
SP GR UR STRIP: 1.02 (ref 1–1.03)
UROBILINOGEN UR STRIP-MCNC: ABNORMAL MG/DL
WBC #/AREA URNS HPF: ABNORMAL /HPF

## 2023-05-08 NOTE — PROGRESS NOTES
Chief Complaint  Pessary Check (Patient complains of burning with urination, feels like pessary is coming down and rubbing area, has seen spotting on pad. )     History of Present Illness:  Patient is 78 y.o.  who presents to [facility] here for follow-up evaluation of her prolapse as well as pessary.  Patient reports having vaginal pain.  She feels like the pessary is coming down.  She did notice spotting on her pad recently.  Patient is on antiplatelet therapy.  She also reports having burning with urination.  She denies any flank pain, fever, or chills.  She continues to have urinary incontinence.  She reports no changes with her constipation.    History  Past Medical History:   Diagnosis Date   • Asymptomatic bacteriuria    • Cataract    • COVID-19 vaccine series completed     Moderna   • Difficulty walking    • Ear piercing    • Esophageal stricture    • GERD (gastroesophageal reflux disease)    • Headache, tension-type    • History of prolapse of bladder    • HL (hearing loss)     no hearing aids   • Hyperlipidemia    • Hypertension    • Impaired fasting glucose    • Obesity    • Osteoarthritis    • Rheumatoid arthritis    • Shingles    • Skin cancer     left ankle   • Stroke 20   • Vision loss    • Wears glasses      Current Outpatient Medications on File Prior to Visit   Medication Sig Dispense Refill   • acetaminophen (TYLENOL) 650 MG 8 hr tablet Take 1 tablet by mouth 2 (Two) Times a Day As Needed for Mild Pain .     • acyclovir (ZOVIRAX) 400 MG tablet Take 1 tablet by mouth Every 4 (Four) Hours While Awake. Take no more than 5 doses a day. (Patient taking differently: Take 1 tablet by mouth Daily As Needed. Take no more than 5 doses a day.) 50 tablet 3   • aspirin 81 MG EC tablet Take 1 tablet by mouth Daily. 30 tablet 0   • atorvastatin (LIPITOR) 40 MG tablet Take 1 tablet by mouth Every Night. 90 tablet 3   • Cholecalciferol 50 MCG ( UT) capsule Take 1 capsule by  mouth.     • clopidogrel (PLAVIX) 75 MG tablet Take 1 tablet by mouth Daily.     • Diclofenac Sodium (VOLTAREN) 1 % gel gel Apply 2 g topically to the appropriate area as directed Every 12 (Twelve) Hours.     • estradiol (ESTRACE) 0.1 MG/GM vaginal cream 0.5  grams intravaginal 2x/week 30 g 11   • folic acid (FOLVITE) 1 MG tablet Take 1 tablet by mouth Daily.  0   • hydroxychloroquine (PLAQUENIL) 200 MG tablet 1 tablet 2 (Two) Times a Day.  1   • methotrexate 2.5 MG tablet Take 6 tablets by mouth 1 (One) Time Per Week. On    PT TAKES 7.5MG IN MORNING AND .7.5MG IN EVENING ON SUNDAYS     • omeprazole (priLOSEC) 20 MG capsule Take 1 capsule by mouth Daily. 90 capsule 3     No current facility-administered medications on file prior to visit.     Allergies   Allergen Reactions   • Latex Rash   • Tramadol Nausea Only and Other (See Comments)     GI upset   • Wound Dressing Adhesive Other (See Comments)     slight redness at tape site     Past Surgical History:   Procedure Laterality Date   • COLONOSCOPY     • ENDOSCOPY     • ESOPHAGEAL DILATATION     • GROIN HIDRADENITIS EXCISION Right 10/29/2021    Procedure: GROIN HIDRADENITIS EXCISION with excision of right leg mass;  Surgeon: Tanmay Clements MD;  Location: [facility];  Service: General;  Laterality: Right;   • JOINT REPLACEMENT     • LAPAROSCOPIC TUBAL LIGATION     • REPLACEMENT TOTAL KNEE BILATERAL      ,    • SKIN BIOPSY     • SKIN CANCER EXCISION     • TOTAL HIP ARTHROPLASTY  2014    right     Family History   Problem Relation Age of Onset   • Stroke Mother    • Arthritis Mother    • Heart disease Mother    • Dementia Mother          at 85 yrs. old   • Lung cancer Father    • Cancer Father    • Hyperlipidemia Sister    • Hypertension Sister      Social History     Socioeconomic History   • Marital status:      Spouse name: [facility]   • Number of children: 2   Tobacco Use   • Smoking status: Former     Packs/day: 1.00     Years: 20.00     Pack  "years: 20.00     Types: Cigarettes     Start date: 1961     Quit date: 1983     Years since quittin.3     Passive exposure: Past   • Smokeless tobacco: Never   Vaping Use   • Vaping Use: Never used   Substance and Sexual Activity   • Alcohol use: Not Currently     Comment: rare    • Drug use: No   • Sexual activity: Not Currently     Partners: Male     Birth control/protection: Tubal ligation       Physical Examination:  Vital Signs: /76   Ht 175.3 cm (69\")   Wt 104 kg (229 lb)   BMI 33.82 kg/m²     General Appearance: alert, appears stated age, and cooperative  Breasts: Not performed.  Abdomen: no masses, no hepatomegaly, no splenomegaly, soft non-tender, no guarding, and no rebound tenderness  Pelvic: Clinical staff was present for exam  External genitalia:  normal appearance of the external genitalia including Bartholin's and Gap's glands.  :  urethral meatus normal;  Vaginal:  atrophic mucosal changes are present; no blood noted.  Cervix:  absent.  Uterus:  absent.  Adnexa:  non palpable bilaterally.  Cystocele GRADE 2  Rectocele GRADE 2  Uterine prolapse grade 2  Pessary removed, cleaned, and reinserted without difficulty    Data Review:  The following data was reviewed by: Rafia Owens MD on 2023:     Labs:  Urinalysis With Microscopic - Urine, Clean Catch (2023 15:42)  Urine Culture - Urine, Urine, Clean Catch (2023 15:42)  Microscopic Examination - (2023 15:42)    Imaging:    Medical Records:  None    Assessment and Plan   1. Rectocele  There are no changes noted on examination.  Patient desires to continue with her currently prescribed incontinence dish pessary.  She is instructed to keep her stools soft and no straining with defecation.    2. Cystocele, midline  There are no changes noted on examination.  Patient desires to continue with her currently prescribed incontinence dish pessary.  She is to follow-up in 2 months as discussed.    3. Encounter for " pessary maintenance  The patient's pessary was removed, cleaned, and reinserted.  Instructions and precautions were given.  The patient is to follow up as discussed.    4. Urinary tract infection symptoms  We will send urine for clean-catch UA culture and sensitivity.  Patient is to call for the results.  Prescription is given for Bactrim if needed over the weekend as discussed.  - Urinalysis With Microscopic - Urine, Clean Catch  - Urine Culture - Urine, Urine, Clean Catch  - sulfamethoxazole-trimethoprim (Bactrim DS) 800-160 MG per tablet; Take 1 tablet by mouth 2 (Two) Times a Day for 7 days.  Dispense: 14 tablet; Refill: 0  - Microscopic Examination -    5. Vaginal pain  Patient with vaginal pain as noted.  Patient is instructed to apply her estrogen cream to the periurethral area as well as vaginal introitus.    6. Burning with urination  We will send urine for culture as noted.  Prescription is given for Bactrim.  Patient is to increase her p.o. fluids.  Plan pending results.  - Urinalysis With Microscopic - Urine, Clean Catch  - Urine Culture - Urine, Urine, Clean Catch  - sulfamethoxazole-trimethoprim (Bactrim DS) 800-160 MG per tablet; Take 1 tablet by mouth 2 (Two) Times a Day for 7 days.  Dispense: 14 tablet; Refill: 0  - Microscopic Examination -    7. Postmenopausal atrophic vaginitis  Patient with continued atrophic changes as noted.  Patient is instructed to apply the estrogen cream to the periurethral area as well as vaginal introitus.    8. Urinary incontinence, unspecified type  Patient reports no changes in her urinary incontinence.  She desires to continue with pessary for management of her symptoms at present.  - Urinalysis With Microscopic - Urine, Clean Catch  - Urine Culture - Urine, Urine, Clean Catch    9. Uterine prolapse  There are no changes noted on examination.  Patient desires to continue with her incontinence dish pessary as discussed.    Follow Up/Instructions:  Follow up as  noted.  Patient was given instructions and counseling regarding her condition or for health maintenance advice. Please see specific information pulled into the AVS if appropriate.     Note: Speech recognition transcription software may have been used to dictate portions of this document.  An attempt at proofreading has been made though minor errors in transcription may still be present.    This note was electronically signed.  Rafia Owens M.D.

## 2023-05-09 ENCOUNTER — OFFICE VISIT (OUTPATIENT)
Dept: CARDIOLOGY | Facility: CLINIC | Age: 78
End: 2023-05-09
Payer: MEDICARE

## 2023-05-09 VITALS
HEIGHT: 69 IN | HEART RATE: 67 BPM | WEIGHT: 229 LBS | DIASTOLIC BLOOD PRESSURE: 66 MMHG | RESPIRATION RATE: 16 BRPM | SYSTOLIC BLOOD PRESSURE: 110 MMHG | OXYGEN SATURATION: 96 % | BODY MASS INDEX: 33.92 KG/M2

## 2023-05-09 DIAGNOSIS — E66.9 OBESITY (BMI 30-39.9): ICD-10-CM

## 2023-05-09 DIAGNOSIS — G45.9 TIA (TRANSIENT ISCHEMIC ATTACK): Primary | ICD-10-CM

## 2023-05-09 DIAGNOSIS — I10 ESSENTIAL HYPERTENSION: ICD-10-CM

## 2023-05-09 NOTE — PROGRESS NOTES
"             [facility] Cardiology Office Follow Up Note    Marisol Muro  [id number]  2023    Primary Care Provider: Mati Samson MD    Chief Complaint: Routine follow-up    History of Present Illness:   Mrs. Marisol Muro is a 78 y.o. female who presents to the Cardiology Clinic for routine follow-up. The patient has a past medical history significant for hypertension, hyperlipidemia, rheumatoid arthritis, and obesity with a BMI 33.  Her recent medical history includes a TIA in  at which time she presented with difficulty walking and dysarthria.  An MRI at that time did not show any acute CVA.    She separately underwent 30-day outpatient cardiac monitoring in  with no significant atrial fibrillation or arrhythmias identified.  She presents today for follow-up.  Since her last appointment, the patient reports she has been well without significant changes in her health.  She has not had any recurrent TIA symptoms.  No significant palpitations.  No chest pain or exertional angina.  No specific complaints today.    Past Cardiac Testin. Last Coronary Angio: None  2. Prior Stress Testing: None  3. Last Echo:               1.  Normal left ventricular size and systolic function, LVEF 60-65%.              2.  Indeterminate LV diastolic filling pattern.              3.  Normal right ventricular size and systolic function.              4.  Severely increased left atrial volume index.              5.  Mild mitral regurgitation.              6.  Mild pulmonic regurgitation.  4. Prior Holter Monitor:  30-day MCOT    1.  The predominant rhythm was sinus rhythm with an average heart rate 66 bpm.   2.  No sustained arrhythmias.   3.  Limited runs of NSVT, longest being 6 beats in duration.   4.  Three symptomatic events including \"chest pain, skipped beats\" corresponding to NSR.    Review of Systems:   Review of Systems   Constitutional: Negative for activity change, appetite " change, chills, diaphoresis, fatigue, fever, unexpected weight gain and unexpected weight loss.   Eyes: Negative for blurred vision and double vision.   Respiratory: Negative for cough, chest tightness, shortness of breath and wheezing.    Cardiovascular: Negative for chest pain, palpitations and leg swelling.   Gastrointestinal: Negative for abdominal pain, anal bleeding, blood in stool and GERD.   Endocrine: Negative for cold intolerance and heat intolerance.   Genitourinary: Negative for hematuria.   Musculoskeletal: Positive for back pain.   Neurological: Negative for dizziness, syncope, weakness and light-headedness.   Hematological: Does not bruise/bleed easily.   Psychiatric/Behavioral: Negative for depressed mood and stress. The patient is not nervous/anxious.        I have reviewed and/or updated the patient's past medical, past surgical, family, social history, problem list and allergies as appropriate.     Medications:     Current Outpatient Medications:   •  acetaminophen (TYLENOL) 650 MG 8 hr tablet, Take 1 tablet by mouth 2 (Two) Times a Day As Needed for Mild Pain ., Disp: , Rfl:   •  acyclovir (ZOVIRAX) 400 MG tablet, Take 1 tablet by mouth Every 4 (Four) Hours While Awake. Take no more than 5 doses a day. (Patient taking differently: Take 1 tablet by mouth Daily As Needed. Take no more than 5 doses a day.), Disp: 50 tablet, Rfl: 3  •  aspirin 81 MG EC tablet, Take 1 tablet by mouth Daily., Disp: 30 tablet, Rfl: 0  •  atorvastatin (LIPITOR) 40 MG tablet, Take 1 tablet by mouth Every Night., Disp: 90 tablet, Rfl: 3  •  Cholecalciferol 50 MCG (2000 UT) capsule, Take 1 capsule by mouth., Disp: , Rfl:   •  clopidogrel (PLAVIX) 75 MG tablet, Take 1 tablet by mouth Daily., Disp: , Rfl:   •  Diclofenac Sodium (VOLTAREN) 1 % gel gel, Apply 2 g topically to the appropriate area as directed Every 12 (Twelve) Hours., Disp: , Rfl:   •  estradiol (ESTRACE) 0.1 MG/GM vaginal cream, 0.5  grams intravaginal 2x/week,  "Disp: 30 g, Rfl: 11  •  folic acid (FOLVITE) 1 MG tablet, Take 1 tablet by mouth Daily., Disp: , Rfl: 0  •  hydroxychloroquine (PLAQUENIL) 200 MG tablet, 1 tablet 2 (Two) Times a Day., Disp: , Rfl: 1  •  methotrexate 2.5 MG tablet, Take 6 tablets by mouth 1 (One) Time Per Week. On Sunday   PT TAKES 7.5MG IN MORNING AND .7.5MG IN EVENING ON SUNDAYS, Disp: , Rfl:   •  omeprazole (priLOSEC) 20 MG capsule, Take 1 capsule by mouth Daily., Disp: 90 capsule, Rfl: 3  •  sulfamethoxazole-trimethoprim (Bactrim DS) 800-160 MG per tablet, Take 1 tablet by mouth 2 (Two) Times a Day for 7 days., Disp: 14 tablet, Rfl: 0    Physical Exam:  Vital Signs:   Vitals:    05/09/23 1444   BP: 110/66   BP Location: Right arm   Patient Position: Sitting   Pulse: 67   Resp: 16   SpO2: 96%   Weight: 104 kg (229 lb)   Height: 175.3 cm (69\")       Physical Exam  Constitutional:       General: She is not in acute distress.     Appearance: She is well-developed. She is obese. She is not diaphoretic.   HENT:      Head: Normocephalic and atraumatic.   Eyes:      General: No scleral icterus.     Pupils: Pupils are equal, round, and reactive to light.   Neck:      Trachea: No tracheal deviation.   Cardiovascular:      Rate and Rhythm: Normal rate and regular rhythm.      Heart sounds: Normal heart sounds. No murmur heard.    No friction rub. No gallop.      Comments: Normal JVD.  Pulmonary:      Effort: Pulmonary effort is normal. No respiratory distress.      Breath sounds: Normal breath sounds. No stridor. No wheezing or rales.   Chest:      Chest wall: No tenderness.   Abdominal:      General: Bowel sounds are normal. There is no distension.      Palpations: Abdomen is soft.      Tenderness: There is no abdominal tenderness. There is no guarding or rebound.   Musculoskeletal:         General: No swelling. Normal range of motion.      Cervical back: Neck supple. No tenderness.   Lymphadenopathy:      Cervical: No cervical adenopathy.   Skin:     " General: Skin is warm and dry.      Findings: No erythema.   Neurological:      General: No focal deficit present.      Mental Status: She is alert and oriented to person, place, and time.   Psychiatric:         Mood and Affect: Mood normal.         Behavior: Behavior normal.         Results Review:   I reviewed the patient's new clinical results.      Assessment / Plan:     1. TIA (transient ischemic attack)   -- History of TIA in 8/22 presenting with difficulty walking and dysarthria  -- MRI without evidence of acute CVA at that time  -- Echocardiogram completed, normal LV systolic function without significant underlying valvular abnormalities  -- Outpatient cardiac monitoring did not show any evidence of atrial fibrillation in 4/23  -- Discussed loop recorder placement, however the patient has declined at this time  -- If development of palpitations, would then consider repeat outpatient MCOT monitoring  -- Will follow on an as-needed basis    2. Essential hypertension  --  Currently normotensive without antihypertensive medications     3. Obesity (BMI 30-39.9)  -- Weight loss advised    Follow Up:   Return if symptoms worsen or fail to improve.      Thank you for allowing me to participate in the care of your patient. Please to not hesitate to contact me with additional questions or concerns.     JOSE CARLOS Neely MD  Interventional Cardiology   05/09/2023  14:38 EDT

## 2023-06-12 ENCOUNTER — APPOINTMENT (OUTPATIENT)
Dept: CT IMAGING | Facility: HOSPITAL | Age: 78
End: 2023-06-12
Payer: MEDICARE

## 2023-06-12 ENCOUNTER — HOSPITAL ENCOUNTER (EMERGENCY)
Facility: HOSPITAL | Age: 78
Discharge: HOME | End: 2023-06-12
Attending: EMERGENCY MEDICINE | Admitting: EMERGENCY MEDICINE
Payer: MEDICARE

## 2023-06-12 VITALS
SYSTOLIC BLOOD PRESSURE: 148 MMHG | RESPIRATION RATE: 16 BRPM | OXYGEN SATURATION: 97 % | TEMPERATURE: 97.9 F | BODY MASS INDEX: 31.84 KG/M2 | DIASTOLIC BLOOD PRESSURE: 74 MMHG | HEART RATE: 60 BPM | HEIGHT: 69 IN | WEIGHT: 215 LBS

## 2023-06-12 DIAGNOSIS — I10 HYPERTENSION, UNSPECIFIED TYPE: Primary | ICD-10-CM

## 2023-06-12 LAB
ALBUMIN SERPL-MCNC: 4 G/DL (ref 3.5–5.2)
ALBUMIN/GLOB SERPL: 1.5 G/DL
ALP SERPL-CCNC: 78 U/L (ref 39–117)
ALT SERPL W P-5'-P-CCNC: 13 U/L (ref 1–33)
ANION GAP SERPL CALCULATED.3IONS-SCNC: 13.1 MMOL/L (ref 5–15)
AST SERPL-CCNC: 20 U/L (ref 1–32)
BASOPHILS # BLD AUTO: 0.03 10*3/MM3 (ref 0–0.2)
BASOPHILS NFR BLD AUTO: 0.5 % (ref 0–1.5)
BILIRUB SERPL-MCNC: 0.6 MG/DL (ref 0–1.2)
BUN SERPL-MCNC: 11 MG/DL (ref 8–23)
BUN/CREAT SERPL: 15.3 (ref 7–25)
CALCIUM SPEC-SCNC: 9.5 MG/DL (ref 8.6–10.5)
CHLORIDE SERPL-SCNC: 103 MMOL/L (ref 98–107)
CO2 SERPL-SCNC: 23.9 MMOL/L (ref 22–29)
CREAT SERPL-MCNC: 0.72 MG/DL (ref 0.57–1)
DEPRECATED RDW RBC AUTO: 50.9 FL (ref 37–54)
EGFRCR SERPLBLD CKD-EPI 2021: 84.6 ML/MIN/1.73
EOSINOPHIL # BLD AUTO: 0.09 10*3/MM3 (ref 0–0.4)
EOSINOPHIL NFR BLD AUTO: 1.5 % (ref 0.3–6.2)
ERYTHROCYTE [DISTWIDTH] IN BLOOD BY AUTOMATED COUNT: 15.5 % (ref 12.3–15.4)
GLOBULIN UR ELPH-MCNC: 2.6 GM/DL
GLUCOSE SERPL-MCNC: 113 MG/DL (ref 65–99)
HCT VFR BLD AUTO: 38 % (ref 34–46.6)
HGB BLD-MCNC: 12.3 G/DL (ref 12–15.9)
HOLD SPECIMEN: NORMAL
HOLD SPECIMEN: NORMAL
IMM GRANULOCYTES # BLD AUTO: 0.01 10*3/MM3 (ref 0–0.05)
IMM GRANULOCYTES NFR BLD AUTO: 0.2 % (ref 0–0.5)
LYMPHOCYTES # BLD AUTO: 1.03 10*3/MM3 (ref 0.7–3.1)
LYMPHOCYTES NFR BLD AUTO: 17 % (ref 19.6–45.3)
MCH RBC QN AUTO: 30 PG (ref 26.6–33)
MCHC RBC AUTO-ENTMCNC: 32.4 G/DL (ref 31.5–35.7)
MCV RBC AUTO: 92.7 FL (ref 79–97)
MONOCYTES # BLD AUTO: 0.33 10*3/MM3 (ref 0.1–0.9)
MONOCYTES NFR BLD AUTO: 5.5 % (ref 5–12)
NEUTROPHILS NFR BLD AUTO: 4.56 10*3/MM3 (ref 1.7–7)
NEUTROPHILS NFR BLD AUTO: 75.3 % (ref 42.7–76)
NRBC BLD AUTO-RTO: 0 /100 WBC (ref 0–0.2)
PLATELET # BLD AUTO: 205 10*3/MM3 (ref 140–450)
PMV BLD AUTO: 10.7 FL (ref 6–12)
POTASSIUM SERPL-SCNC: 3.7 MMOL/L (ref 3.5–5.2)
PROT SERPL-MCNC: 6.6 G/DL (ref 6–8.5)
RBC # BLD AUTO: 4.1 10*6/MM3 (ref 3.77–5.28)
SODIUM SERPL-SCNC: 140 MMOL/L (ref 136–145)
WBC NRBC COR # BLD: 6.05 10*3/MM3 (ref 3.4–10.8)
WHOLE BLOOD HOLD COAG: NORMAL
WHOLE BLOOD HOLD SPECIMEN: NORMAL

## 2023-06-12 PROCEDURE — 85025 COMPLETE CBC W/AUTO DIFF WBC: CPT | Performed by: PHYSICIAN ASSISTANT

## 2023-06-12 PROCEDURE — 70450 CT HEAD/BRAIN W/O DYE: CPT

## 2023-06-12 PROCEDURE — 80053 COMPREHEN METABOLIC PANEL: CPT | Performed by: PHYSICIAN ASSISTANT

## 2023-06-12 PROCEDURE — 99283 EMERGENCY DEPT VISIT LOW MDM: CPT

## 2023-06-12 PROCEDURE — 93005 ELECTROCARDIOGRAM TRACING: CPT | Performed by: PHYSICIAN ASSISTANT

## 2023-06-12 RX ORDER — HYDROCHLOROTHIAZIDE 12.5 MG/1
6.25 TABLET ORAL DAILY
Qty: 14 TABLET | Refills: 0 | Status: SHIPPED | OUTPATIENT
Start: 2023-06-12

## 2023-06-12 RX ORDER — SODIUM CHLORIDE 0.9 % (FLUSH) 0.9 %
10 SYRINGE (ML) INJECTION AS NEEDED
Status: DISCONTINUED | OUTPATIENT
Start: 2023-06-12 | End: 2023-06-12 | Stop reason: HOSPADM

## 2023-06-12 RX ORDER — HYDROCHLOROTHIAZIDE 12.5 MG/1
12.5 TABLET ORAL DAILY
Qty: 14 TABLET | Refills: 0 | Status: SHIPPED | OUTPATIENT
Start: 2023-06-12 | End: 2023-06-12 | Stop reason: SDUPTHER

## 2023-06-12 NOTE — ED PROVIDER NOTES
Subjective  History of Present Illness:    Chief Complaint:   Chief Complaint   Patient presents with    Headache    Hypertension     Pt complains of headache x2 days, checks bp at home and it was 184/93.       History of Present Illness: Marisol Muro is a 78 y.o. female who presents to the emergency department complaining of headache and high blood pressure.  Patient states she was on HCTZ 12.5 mg until 4/27 and she discontinued it because her systolic blood pressures were running in the 90s.  She had well-controlled blood pressure over the past month or so but the past few days its been as high as 180s systolic.  She has had a constant pressure-like headache for 2 days.  No chest pain or shortness of breath.  No numbness tingling or weakness.  History of a TIA on Plavix.  Took one of her HCTZ pills about 2 hours prior to arrival, blood pressure currently 170/71.  Headache is slightly improved.  Onset: 2 days ago  Duration: Ongoing  Exacerbating / Alleviating factors: Improved with antihypertensive medicines  Associated symptoms: None      Nurses Notes reviewed and agree, including vitals, allergies, social history and prior medical history.     Review of Systems   Constitutional: Negative.    Eyes: Negative.    Respiratory: Negative.     Cardiovascular:  Negative for chest pain.        Hypertension   Gastrointestinal: Negative.    Genitourinary: Negative.    Musculoskeletal: Negative.    Skin: Negative.    Neurological:  Positive for headaches.   Psychiatric/Behavioral: Negative.       Past Medical History:   Diagnosis Date    Asymptomatic bacteriuria     Cataract     COVID-19 vaccine series completed     Moderna    Difficulty walking     Ear piercing     Esophageal stricture     GERD (gastroesophageal reflux disease)     Headache, tension-type     History of prolapse of bladder     HL (hearing loss)     no hearing aids    Hyperlipidemia     Hypertension     Impaired fasting glucose     Obesity      "Osteoarthritis     Rheumatoid arthritis     Shingles     Skin cancer     left ankle    Stroke 20    Vision loss     Wears glasses        Allergies:    Latex, Tramadol, and Wound dressing adhesive      Past Surgical History:   Procedure Laterality Date    COLONOSCOPY      ENDOSCOPY      ESOPHAGEAL DILATATION      GROIN HIDRADENITIS EXCISION Right 10/29/2021    Procedure: GROIN HIDRADENITIS EXCISION with excision of right leg mass;  Surgeon: Tanmay Clements MD;  Location: [facility];  Service: General;  Laterality: Right;    JOINT REPLACEMENT      LAPAROSCOPIC TUBAL LIGATION      REPLACEMENT TOTAL KNEE BILATERAL      ,     SKIN BIOPSY      SKIN CANCER EXCISION      TOTAL HIP ARTHROPLASTY  2014    right         Social History     Socioeconomic History    Marital status:      Spouse name: Ashu    Number of children: 2   Tobacco Use    Smoking status: Former     Packs/day: 1.00     Years: 20.00     Pack years: 20.00     Types: Cigarettes     Start date: 1961     Quit date: 1983     Years since quittin.4     Passive exposure: Past    Smokeless tobacco: Never   Vaping Use    Vaping Use: Never used   Substance and Sexual Activity    Alcohol use: Not Currently     Comment: rare     Drug use: No    Sexual activity: Not Currently     Partners: Male     Birth control/protection: Tubal ligation         Family History   Problem Relation Age of Onset    Stroke Mother     Arthritis Mother     Heart disease Mother     Dementia Mother          at 85 yrs. old    Lung cancer Father     Cancer Father     Hyperlipidemia Sister     Hypertension Sister        Objective  Physical Exam:  /71 (BP Location: Left arm, Patient Position: Sitting)   Pulse 83   Temp 97.9 °F (36.6 °C) (Oral)   Resp 16   Ht 175.3 cm (69\")   Wt 97.5 kg (215 lb)   SpO2 92%   BMI 31.75 kg/m²      Physical Exam  Vitals and nursing note reviewed.   Constitutional:       General: She is not in acute distress.     Appearance: " Normal appearance. She is not ill-appearing, toxic-appearing or diaphoretic.   HENT:      Head: Normocephalic and atraumatic.      Nose: Nose normal.   Eyes:      Extraocular Movements: Extraocular movements intact.   Cardiovascular:      Rate and Rhythm: Normal rate and regular rhythm.      Heart sounds: Normal heart sounds.   Pulmonary:      Effort: Pulmonary effort is normal.   Abdominal:      General: Abdomen is flat.      Palpations: Abdomen is soft.   Musculoskeletal:         General: Normal range of motion.      Cervical back: Normal range of motion.      Right lower leg: Edema present.      Left lower leg: Edema present.   Skin:     General: Skin is warm and dry.   Neurological:      General: No focal deficit present.      Mental Status: She is alert and oriented to person, place, and time. Mental status is at baseline.   Psychiatric:         Mood and Affect: Mood normal.         Behavior: Behavior normal.         Procedures    ED Course:    ED Course as of 06/12/23 1809 Mon Jun 12, 2023   1631 EKG interpreted by me: Sinus rhythm, normal rate, no acute ST changes, nonspecific T wave flattening, this is an abnormal EKG [MP]      ED Course User Index  [MP] Huy Mcconnell MD       Lab Results (last 24 hours)       Procedure Component Value Units Date/Time    Comprehensive Metabolic Panel [[id number]]  (Abnormal) Collected: 06/12/23 1554    Specimen: Blood Updated: 06/12/23 1621     Glucose 113 mg/dL      BUN 11 mg/dL      Creatinine 0.72 mg/dL      Sodium 140 mmol/L      Potassium 3.7 mmol/L      Chloride 103 mmol/L      CO2 23.9 mmol/L      Calcium 9.5 mg/dL      Total Protein 6.6 g/dL      Albumin 4.0 g/dL      ALT (SGPT) 13 U/L      AST (SGOT) 20 U/L      Alkaline Phosphatase 78 U/L      Total Bilirubin 0.6 mg/dL      Globulin 2.6 gm/dL      A/G Ratio 1.5 g/dL      BUN/Creatinine Ratio 15.3     Anion Gap 13.1 mmol/L      eGFR 84.6 mL/min/1.73     Narrative:      GFR Normal >60  Chronic Kidney  Disease <60  Kidney Failure <15    The GFR formula is only valid for adults with stable renal function between ages 18 and 70.    CBC & Differential [[id number]]  (Abnormal) Collected: 06/12/23 1554    Specimen: Blood Updated: 06/12/23 1630    Narrative:      The following orders were created for panel order CBC & Differential.  Procedure                               Abnormality         Status                     ---------                               -----------         ------                     CBC Auto Differential[[id number]]        Abnormal            Final result                 Please view results for these tests on the individual orders.    CBC Auto Differential [[id number]]  (Abnormal) Collected: 06/12/23 1554    Specimen: Blood Updated: 06/12/23 1630     WBC 6.05 10*3/mm3      RBC 4.10 10*6/mm3      Hemoglobin 12.3 g/dL      Hematocrit 38.0 %      MCV 92.7 fL      MCH 30.0 pg      MCHC 32.4 g/dL      RDW 15.5 %      RDW-SD 50.9 fl      MPV 10.7 fL      Platelets 205 10*3/mm3      Neutrophil % 75.3 %      Lymphocyte % 17.0 %      Monocyte % 5.5 %      Eosinophil % 1.5 %      Basophil % 0.5 %      Immature Grans % 0.2 %      Neutrophils, Absolute 4.56 10*3/mm3      Lymphocytes, Absolute 1.03 10*3/mm3      Monocytes, Absolute 0.33 10*3/mm3      Eosinophils, Absolute 0.09 10*3/mm3      Basophils, Absolute 0.03 10*3/mm3      Immature Grans, Absolute 0.01 10*3/mm3      nRBC 0.0 /100 WBC              CT Head Without Contrast    Result Date: 6/12/2023  FINAL REPORT TECHNIQUE: Routine axial images through the head were obtained without contrast. CLINICAL HISTORY: headache, htn FINDINGS: The ventricles are dilated, proportionate to the degree of generalized atrophy.  Periventricular and deep white matter low-attenuation areas are consistent with chronic small vessel ischemia.  There is no mass or shift in midline structures. There is no intracranial hemorrhage.  There is no acute sinus or osseous abnormality.      Impression: No acute intracranial abnormality. Authenticated and Electronically Signed by Valente Watson M.D. on 06/12/2023 05:48:51 PM        Medical Decision Making  Amount and/or Complexity of Data Reviewed  Labs: ordered.  Radiology: ordered.  ECG/medicine tests: ordered.    Risk  Prescription drug management.        Marisol Muro is a 78 y.o. female who presents to the emergency department for evaluation of elevated blood pressure, headache    Differential diagnosis includes hypertension, ICH among other etiologies.    CBC, CMP, CT scan of the head ordered for further evaluation of the patient's presentation.    Chart review if available included outside testing, previous visits, prior labs, prior imaging, available notes from prior evaluations or visits with specialists, medication list, allergies, past medical history, past surgical history when applicable.    Patient was treated with no intervention    His blood pressure 140s over 70s at this time 1805 hrs.  We will have her take half her dose of her 12.5 HCTZ and follow-up with PCP    Plan for disposition is discharged home.  Patient/family comfortable with and understanding of the plan.      Final diagnoses:   Hypertension, unspecified type          Rodri Sinclair PA-C  06/12/23 8235

## 2023-07-27 RX ORDER — HYDROCHLOROTHIAZIDE 12.5 MG/1
12.5 TABLET ORAL DAILY
Qty: 90 TABLET | Refills: 3 | Status: SHIPPED | OUTPATIENT
Start: 2023-07-27

## 2023-07-31 RX ORDER — ATORVASTATIN CALCIUM 40 MG/1
40 TABLET, FILM COATED ORAL NIGHTLY
Qty: 90 TABLET | Refills: 3 | Status: SHIPPED | OUTPATIENT
Start: 2023-07-31

## 2023-08-25 NOTE — PRE-PROCEDURE INSTRUCTIONS
PAT phone history completed with pt for upcoming procedure on  8/28/23 with Dr. Shannon.    PAT PASS GIVEN/REVIEWED WITH PT.  VERBALIZED UNDERSTANDING OF THE FOLLOWING:  DO NOT EAT, DRINK, SMOKE, USE SMOKELESS TOBACCO OR CHEW GUM AFTER MIDNIGHT THE NIGHT BEFORE SURGERY.  THIS ALSO INCLUDES HARD CANDIES AND MINTS.    DO NOT SHAVE THE AREA TO BE OPERATED ON AT LEAST 48 HOURS PRIOR TO THE PROCEDURE.  DO NOT WEAR MAKE UP OR NAIL POLISH.  DO NOT LEAVE IN ANY PIERCING OR WEAR JEWELRY THE DAY OF SURGERY.      DO NOT USE ADHESIVES IF YOU WEAR DENTURES.    DO NOT WEAR EYE CONTACTS; BRING IN YOUR GLASSES.    ONLY TAKE MEDICATION THE MORNING OF YOUR PROCEDURE IF INSTRUCTED BY YOUR SURGEON WITH ENOUGH WATER TO SWALLOW THE MEDICATION.  IF YOUR SURGEON DID NOT SPECIFY WHICH MEDICATIONS TO TAKE, YOU WILL NEED TO CALL THEIR OFFICE FOR FURTHER INSTRUCTIONS AND DO AS THEY INSTRUCT.    LEAVE ANYTHING YOU CONSIDER VALUABLE AT HOME.    YOU WILL NEED TO ARRANGE FOR SOMEONE TO DRIVE YOU HOME AFTER SURGERY.  IT IS RECOMMENDED THAT YOU DO NOT DRIVE, WORK, DRINK ALCOHOL OR MAKE MAJOR DECISIONS FOR AT LEAST 24 HOURS AFTER YOUR PROCEDURE IS COMPLETE.      THE DAY OF YOUR PROCEDURE, BRING IN THE FOLLOWING IF APPLICABLE:   PICTURE ID AND INSURANCE/MEDICARE OR MEDICAID CARDS/ANY CO-PAY THAT MAY BE DUE   COPY OF ADVANCED DIRECTIVE/LIVING WILL/POWER OR    CPAP/BIPAP/INHALERS   SKIN PREP SHEET   YOUR PREADMISSION TESTING PASS (IF NOT A PHONE HISTORY)    Medication instructions given to pt by RN per anesthesia protocol.  Pt referred back to surgeon for further instructions if he/she is on any blood thinners.

## 2023-08-28 ENCOUNTER — HOSPITAL ENCOUNTER (OUTPATIENT)
Facility: HOSPITAL | Age: 78
Setting detail: HOSPITAL OUTPATIENT SURGERY
Discharge: HOME | End: 2023-08-28
Attending: OPHTHALMOLOGY | Admitting: OPHTHALMOLOGY
Payer: MEDICARE

## 2023-08-28 ENCOUNTER — ANESTHESIA (OUTPATIENT)
Dept: PERIOP | Facility: HOSPITAL | Age: 78
End: 2023-08-28
Payer: MEDICARE

## 2023-08-28 ENCOUNTER — ANESTHESIA EVENT (OUTPATIENT)
Dept: PERIOP | Facility: HOSPITAL | Age: 78
End: 2023-08-28
Payer: MEDICARE

## 2023-08-28 VITALS
SYSTOLIC BLOOD PRESSURE: 146 MMHG | DIASTOLIC BLOOD PRESSURE: 59 MMHG | RESPIRATION RATE: 18 BRPM | BODY MASS INDEX: 31.84 KG/M2 | TEMPERATURE: 97 F | HEIGHT: 69 IN | WEIGHT: 215 LBS | OXYGEN SATURATION: 95 % | HEART RATE: 73 BPM

## 2023-08-28 PROCEDURE — 25010000002 MIDAZOLAM PER 1MG: Performed by: NURSE ANESTHETIST, CERTIFIED REGISTERED

## 2023-08-28 PROCEDURE — 25010000002 FENTANYL CITRATE (PF) 50 MCG/ML SOLUTION: Performed by: NURSE ANESTHETIST, CERTIFIED REGISTERED

## 2023-08-28 PROCEDURE — 25010000002 EPINEPHRINE PER 0.1 MG: Performed by: OPHTHALMOLOGY

## 2023-08-28 PROCEDURE — V2632 POST CHMBR INTRAOCULAR LENS: HCPCS | Performed by: OPHTHALMOLOGY

## 2023-08-28 DEVICE — LENS MONOFOCAL IQ CC60WF200: Type: IMPLANTABLE DEVICE | Site: POSTERIOR CHAMBER | Status: FUNCTIONAL

## 2023-08-28 RX ORDER — SODIUM CHLORIDE 0.9 % (FLUSH) 0.9 %
10 SYRINGE (ML) INJECTION AS NEEDED
Status: DISCONTINUED | OUTPATIENT
Start: 2023-08-28 | End: 2023-08-28 | Stop reason: HOSPADM

## 2023-08-28 RX ORDER — FENTANYL CITRATE 50 UG/ML
INJECTION, SOLUTION INTRAMUSCULAR; INTRAVENOUS AS NEEDED
Status: DISCONTINUED | OUTPATIENT
Start: 2023-08-28 | End: 2023-08-28 | Stop reason: SURG

## 2023-08-28 RX ORDER — SODIUM CHLORIDE, SODIUM LACTATE, POTASSIUM CHLORIDE, CALCIUM CHLORIDE 600; 310; 30; 20 MG/100ML; MG/100ML; MG/100ML; MG/100ML
1000 INJECTION, SOLUTION INTRAVENOUS CONTINUOUS
Status: DISCONTINUED | OUTPATIENT
Start: 2023-08-28 | End: 2023-08-28 | Stop reason: HOSPADM

## 2023-08-28 RX ORDER — POLYMYXIN B SULFATE AND TRIMETHOPRIM 1; 10000 MG/ML; [USP'U]/ML
1 SOLUTION OPHTHALMIC SEE ADMIN INSTRUCTIONS
Status: DISCONTINUED | OUTPATIENT
Start: 2023-08-28 | End: 2023-08-28 | Stop reason: HOSPADM

## 2023-08-28 RX ORDER — PREDNISOLONE ACETATE 10 MG/ML
SUSPENSION/ DROPS OPHTHALMIC AS NEEDED
Status: DISCONTINUED | OUTPATIENT
Start: 2023-08-28 | End: 2023-08-28 | Stop reason: HOSPADM

## 2023-08-28 RX ORDER — PREDNISOLONE ACETATE 10 MG/ML
1 SUSPENSION/ DROPS OPHTHALMIC SEE ADMIN INSTRUCTIONS
Status: DISCONTINUED | OUTPATIENT
Start: 2023-08-28 | End: 2023-08-28 | Stop reason: HOSPADM

## 2023-08-28 RX ORDER — TETRACAINE HYDROCHLORIDE 5 MG/ML
SOLUTION OPHTHALMIC AS NEEDED
Status: DISCONTINUED | OUTPATIENT
Start: 2023-08-28 | End: 2023-08-28 | Stop reason: HOSPADM

## 2023-08-28 RX ORDER — POLYMYXIN B SULFATE AND TRIMETHOPRIM 1; 10000 MG/ML; [USP'U]/ML
SOLUTION OPHTHALMIC AS NEEDED
Status: DISCONTINUED | OUTPATIENT
Start: 2023-08-28 | End: 2023-08-28 | Stop reason: HOSPADM

## 2023-08-28 RX ORDER — CYCLOPENT/TROPIC/PHEN/KETR/WAT 1%-1%-2.5%
1 DROPS (EA) OPHTHALMIC (EYE)
Status: COMPLETED | OUTPATIENT
Start: 2023-08-28 | End: 2023-08-28

## 2023-08-28 RX ORDER — MIDAZOLAM HYDROCHLORIDE 2 MG/2ML
INJECTION, SOLUTION INTRAMUSCULAR; INTRAVENOUS AS NEEDED
Status: DISCONTINUED | OUTPATIENT
Start: 2023-08-28 | End: 2023-08-28 | Stop reason: SURG

## 2023-08-28 RX ORDER — TETRACAINE HYDROCHLORIDE 5 MG/ML
1 SOLUTION OPHTHALMIC SEE ADMIN INSTRUCTIONS
Status: DISCONTINUED | OUTPATIENT
Start: 2023-08-28 | End: 2023-08-28 | Stop reason: HOSPADM

## 2023-08-28 RX ORDER — BALANCED SALT SOLUTION 6.4; .75; .48; .3; 3.9; 1.7 MG/ML; MG/ML; MG/ML; MG/ML; MG/ML; MG/ML
SOLUTION OPHTHALMIC AS NEEDED
Status: DISCONTINUED | OUTPATIENT
Start: 2023-08-28 | End: 2023-08-28 | Stop reason: HOSPADM

## 2023-08-28 RX ADMIN — MIDAZOLAM HYDROCHLORIDE 2 MG: 1 INJECTION, SOLUTION INTRAMUSCULAR; INTRAVENOUS at 09:30

## 2023-08-28 RX ADMIN — TETRACAINE HYDROCHLORIDE 1 DROP: 5 SOLUTION OPHTHALMIC at 08:22

## 2023-08-28 RX ADMIN — SODIUM CHLORIDE, POTASSIUM CHLORIDE, SODIUM LACTATE AND CALCIUM CHLORIDE: 600; 310; 30; 20 INJECTION, SOLUTION INTRAVENOUS at 09:26

## 2023-08-28 RX ADMIN — Medication 1 DROP: at 08:33

## 2023-08-28 RX ADMIN — Medication 1 DROP: at 08:23

## 2023-08-28 RX ADMIN — FENTANYL CITRATE 50 MCG: 50 INJECTION, SOLUTION INTRAMUSCULAR; INTRAVENOUS at 09:38

## 2023-08-28 RX ADMIN — FENTANYL CITRATE 50 MCG: 50 INJECTION, SOLUTION INTRAMUSCULAR; INTRAVENOUS at 09:33

## 2023-08-28 RX ADMIN — Medication 1 DROP: at 08:28

## 2023-08-28 RX ADMIN — TETRACAINE HYDROCHLORIDE 1 DROP: 5 SOLUTION OPHTHALMIC at 08:21

## 2023-08-28 NOTE — OP NOTE
PROCEDURE NOTE      Date of Procedure: 8/28/2023  Patient Name:  Marisol Muro  MRN: [id number]  YOB: 1945      PREOPERATIVE DIAGNOSIS:  Visually significant combined form of senile cataract of the Right eye interfering with activities of daily living.    INDICATIONS FOR THE PROCEDURE:  Visually significant combined form of senile cataract of the Right eye interfering with activities of daily living.    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 Hard nucleus    SURGEON:  Astrid Shannon M.D.    NAME OF PROCEDURE:  Right cataract extraction with posterior chamber intraocular lens implant. Lens used: +   Implant Name Type Inv. Item Serial No.  Lot No. LRB No. Used Action   LENS MONOFOCAL IQ [id number] - [id number] - [id number] Implant LENS MONOFOCAL IQ [id number] RANDY  Right 1 Implanted     CDE: 27.13 (~19 in bag)    ANESTHESIA:  Topical with MAC.    COMPLICATIONS:  None.    ESTIMATED BLOOD LOSS:  Less than 1cc.    DETAILS OF THE PROCEDURE:  After receiving appropriate informed consent and confirming and marking the eye to be operated upon, the patient was wheeled into the operating room. After confirming adequate anesthesia, the patient was prepped and draped in a standard sterile ophthalmic fashion. A lid speculum was then placed in the eye.  A 0.8 mm metal blade was then used to make two limbal paracenteses and the anterior chamber was reformed with Viscoat.  A 2.4 mm metal keratome was then used to make a superior clear cornea tunneled incision.  A cystotome was used to puncture the anterior capsule and initiate a capsular flap.  Utrata forceps were used to complete a continuous curvilinear capsulorrhexis.  BSS on a Cardsoo hydrodissection cannula was then used to hydrodissect and hydrodelineate the nucleus.  The nucleus was then phacoemulsified using a divide and conquer  technique.  CDE was 27.13%.  Remaining cortex was removed with bimanual I/A.  Posterior capsular polish was performed.  The capsular bag was then reformed with Provisc and a   Implant Name Type Inv. Item Serial No.  Lot No. LRB No. Used Action   LENS MONOFOCAL IQ [id number] - [id number] - [id number] Implant LENS MONOFOCAL IQ [id number] RANDY  Right 1 Implanted     lens implant, was then placed in the bag without event.  The Provisc was then removed with bimanual irrigation and aspiration and the anterior chamber reformed with BSS.  The wounds were hydrated as necessary to maintain a water tight anterior chamber. Intracameral Moxifloxacin 1mg/0.1ml was administered and 5% povidone iodine solution was placed on the ocular surface. At the conclusion of the procedure, the lid speculum was removed and the patient undraped.  A drop of Polytrim and prednisolone acetate 1%  and shield were placed over the eye.  The patient left the room in good condition having tolerated the procedure well.    Astrid Shannon MD

## 2023-08-28 NOTE — ANESTHESIA PREPROCEDURE EVALUATION
Anesthesia Evaluation     Patient summary reviewed and Nursing notes reviewed   NPO Solid Status: > 8 hours  NPO Liquid Status: > 8 hours           Airway   Mallampati: II  TM distance: >3 FB  Neck ROM: full  Possible difficult intubation  Dental      Pulmonary    Cardiovascular     (+) hypertension, hyperlipidemia    ROS comment: 1.  Normal left ventricular size and systolic function, LVEF 60-65%.  2.  Indeterminate LV diastolic filling pattern.  3.  Normal right ventricular size and systolic function.  4.  Severely increased left atrial volume index.  5.  Mild mitral regurgitation.  6.  Mild pulmonic regurgitation.      Neuro/Psych  (+) TIA, headaches  GI/Hepatic/Renal/Endo    (+) obesity, GERD    Musculoskeletal     Abdominal    Substance History      OB/GYN          Other   arthritis,   history of cancer                  Anesthesia Plan    ASA 3     MAC     intravenous induction     Anesthetic plan, risks, benefits, and alternatives have been provided, discussed and informed consent has been obtained with: patient.  Pre-procedure education provided    CODE STATUS:

## 2023-08-28 NOTE — ANESTHESIA POSTPROCEDURE EVALUATION
Patient: Marisol Muro    Procedure Summary       Date: 08/28/23 Room / Location: [facility] OR 3 /  [facility] OR    Anesthesia Start: 0930 Anesthesia Stop: 0956    Procedure: CATARACT PHACO EXTRACTION WITH INTRAOCULAR LENS IMPLANT RIGHT (Right: Eye) Diagnosis:       Combined forms of age-related cataract of right eye      (Combined forms of age-related cataract of right eye [H25.811])    Surgeons: Astrid Shannon MD Provider: Ang Mayberry CRNA    Anesthesia Type: MAC ASA Status: 3            Anesthesia Type: MAC    Vitals  No vitals data found for the desired time range.          Post Anesthesia Care and Evaluation    Patient location during evaluation: bedside  Patient participation: complete - patient participated  Level of consciousness: awake and alert  Pain score: 0  Pain management: adequate    Airway patency: patent  Anesthetic complications: No anesthetic complications  PONV Status: none  Cardiovascular status: acceptable  Respiratory status: acceptable  Hydration status: acceptable

## 2023-08-28 NOTE — DISCHARGE INSTRUCTIONS
[facility] [address] [place] [address]  [place] [address]  PH: [phone]  FAX: [phone]      Post - Operative Instructions for Dr. Shannon's Cataract Patients      1.  Use your drops as prescribed, wait 5 minutes between each drop.  Eye drops are to be used as instructed during the daytime only. Do not get up in the night to use eye drops.  2.  Continue glaucoma eye drops in both eyes post op.  3.  Securely tape the protective shield over the operated eye at bedtime for the FIRST week only.  4.  Take your regular non-eye medications and continue any eye medications for the  non-operative eye.  5.  For the first week, avoid bending or stooping and lifting anything heavier than 5 pounds.  Also, avoid any blow to the head or eye.  Avoid getting dirty water in the eye.  Avoid sleeping on the side of the operated eye or face.  6.  No driving until you are told to by your physician.  7.  If significant eye pain is not relieved by medication, or you have increased redness, swelling, pain or loss of vision or any symptoms you are unsure of call Dr. Shannon's office at [phone].      You should expect:    Slight Discomfort  Burning When Using Eye Drops  Blurred Vision and Dilated Pupil  Mild Redness  You Will Not Be Able To Read  Your Glasses May Not Work      PHYSICIAN TO CHOOSE THE APPROPRIATE MEDICATIONS TO BE SENT HOME WITH PATIENT    __x__  Prednisolone Acetate 1% (Pred Forte) ophthalmic solution    ____   Difluprednate (Durezol) .05% ophthalmic emulsion    ____   Vigamox(moxifloxacin) 0.5% ophthalmic solution    __x__   Polymyxin B/Trimethoprim solution    ____   Bacitracin Zinc and polymyxin B sulfate ophthalmic ointment   Start the following eyedrops today as soon as you are home:     Prednisolone Acetate 1% (Pred Forte) ophthalmic solution instill one drop every 2 hours while awake  Polytrim instill one drop every 4 hours    Wait 5 mins between drops. Eye drops are to be  used as instructed during the daytime only. Do not get up in the night to use eye drops.    You may only remove the eye shield to administer eye medications (place shield back over eye after each medication administration); otherwise keep shield on at all times until office visit tomorrow.    Please follow all post op instructions and follow up appointment time from your physician's office included in your discharge packet.  . No pushing, pulling, tugging,  heavy lifting, or strenuous activity.  No major decision making, driving, or drinking alcoholic beverages for 24 hours. ( due to the medications you have  received)  Always use good hand hygiene/washing techniques.  NO driving while taking pain medications.    * if you have an incision:  Check your incision area every day for signs of infection.   Check for:  * more redness, swelling, or pain  *more fluid or blood  *warmth  *pus or bad smell    To assist you in voiding:  Drink plenty of fluids  Listen to running water while attempting to void.    If you are unable to urinate and you have an uncomfortable urge to void or it has been   6 hours since you were discharged, return to the Emergency Room

## 2023-09-06 ENCOUNTER — OFFICE VISIT (OUTPATIENT)
Dept: OBSTETRICS AND GYNECOLOGY | Facility: CLINIC | Age: 78
End: 2023-09-06
Payer: MEDICARE

## 2023-09-06 VITALS
BODY MASS INDEX: 32.14 KG/M2 | DIASTOLIC BLOOD PRESSURE: 68 MMHG | HEIGHT: 69 IN | WEIGHT: 217 LBS | SYSTOLIC BLOOD PRESSURE: 142 MMHG

## 2023-09-06 DIAGNOSIS — N95.2 POSTMENOPAUSAL ATROPHIC VAGINITIS: ICD-10-CM

## 2023-09-06 DIAGNOSIS — R32 URINARY INCONTINENCE, UNSPECIFIED TYPE: ICD-10-CM

## 2023-09-06 DIAGNOSIS — N81.6 RECTOCELE: ICD-10-CM

## 2023-09-06 DIAGNOSIS — N81.11 CYSTOCELE, MIDLINE: ICD-10-CM

## 2023-09-06 DIAGNOSIS — N81.4 UTERINE PROLAPSE: ICD-10-CM

## 2023-09-06 DIAGNOSIS — R39.9 URINARY TRACT INFECTION SYMPTOMS: ICD-10-CM

## 2023-09-06 DIAGNOSIS — Z46.89 ENCOUNTER FOR PESSARY MAINTENANCE: Primary | ICD-10-CM

## 2023-09-06 DIAGNOSIS — K59.00 CONSTIPATION, UNSPECIFIED CONSTIPATION TYPE: ICD-10-CM

## 2023-09-06 NOTE — PROGRESS NOTES
Chief Complaint  Pessary Check (2 month pessary follow up. )     History of Present Illness:  Patient is 78 y.o.  who presents to [facility] here for follow-up evaluation of her prolapse and pessary.  Patient reports her pessary stayed in place.  She does report good relief with her pressure and bulge per vagina.  She does feel like she is emptying her bladder completely.  She denies any dysuria, flank pain, fever or chills.  She did have previous UTI.  She has been using her estrogen cream.  She denies any vaginal bleeding or spotting.  She continues to have the constipation although improved.  She sees Dr. Samson for her primary care.  She did have recent cataract surgery.  She is due to have the other at the end of the month.    History  Past Medical History:   Diagnosis Date    Asymptomatic bacteriuria     Cataract     COVID-19 vaccine series completed     Moderna    Difficulty walking     Ear piercing     Elevated cholesterol     Esophageal stricture     GERD (gastroesophageal reflux disease)     Headache, tension-type     Hearing aid worn     History of prolapse of bladder     HL (hearing loss)     no hearing aids    Hyperlipidemia     Hypertension     Impaired fasting glucose     Obesity     Osteoarthritis     Rheumatoid arthritis     Shingles     Skin cancer     left ankle    TIA (transient ischemic attack) 2022    Vision loss     Wears glasses      Current Outpatient Medications on File Prior to Visit   Medication Sig Dispense Refill    acetaminophen (TYLENOL) 650 MG 8 hr tablet Take 1 tablet by mouth 2 (Two) Times a Day As Needed for Mild Pain .      acyclovir (ZOVIRAX) 400 MG tablet Take 1 tablet by mouth Every 4 (Four) Hours While Awake. Take no more than 5 doses a day. (Patient taking differently: Take 1 tablet by mouth Daily As Needed. Take no more than 5 doses a day.) 50 tablet 3    aspirin 81 MG EC tablet Take 1 tablet by mouth Daily. 30 tablet 0    atorvastatin  (LIPITOR) 40 MG tablet Take 1 tablet by mouth Every Night. 90 tablet 3    clopidogrel (PLAVIX) 75 MG tablet Take 1 tablet by mouth Daily.      Diclofenac Sodium (VOLTAREN) 1 % gel gel Apply 2 g topically to the appropriate area as directed Every 12 (Twelve) Hours.      estradiol (ESTRACE) 0.1 MG/GM vaginal cream 0.5  grams intravaginal 2x/week 30 g 11    folic acid (FOLVITE) 1 MG tablet Take 1 tablet by mouth Daily.  0    hydroCHLOROthiazide (HYDRODIURIL) 12.5 MG tablet Take 1 tablet by mouth Daily. (Patient taking differently: 1 tablet Daily.) 90 tablet 3    hydroxychloroquine (PLAQUENIL) 200 MG tablet 1 tablet 2 (Two) Times a Day.  1    methotrexate 2.5 MG tablet Take 6 tablets by mouth 1 (One) Time Per Week. On Sunday      omeprazole (priLOSEC) 20 MG capsule Take 1 capsule by mouth Daily. 90 capsule 3     No current facility-administered medications on file prior to visit.     Allergies   Allergen Reactions    Latex Rash    Tramadol Nausea Only and Other (See Comments)     GI upset    Wound Dressing Adhesive Other (See Comments)     slight redness at tape site     Past Surgical History:   Procedure Laterality Date    CATARACT EXTRACTION W/ INTRAOCULAR LENS IMPLANT Right 8/28/2023    Procedure: CATARACT PHACO EXTRACTION WITH INTRAOCULAR LENS IMPLANT RIGHT;  Surgeon: Astrid Shannon MD;  Location: [facility] OR;  Service: Ophthalmology;  Laterality: Right;    COLONOSCOPY      ENDOSCOPY      ESOPHAGEAL DILATATION      GROIN HIDRADENITIS EXCISION Right 10/29/2021    Procedure: GROIN HIDRADENITIS EXCISION with excision of right leg mass;  Surgeon: Tanmay Clements MD;  Location: [facility] OR;  Service: General;  Laterality: Right;    JOINT REPLACEMENT      LAPAROSCOPIC TUBAL LIGATION      REPLACEMENT TOTAL KNEE BILATERAL      2004, 2008    SKIN BIOPSY      SKIN CANCER EXCISION      TOTAL HIP ARTHROPLASTY  2014    right     Family History   Problem Relation Age of Onset    Stroke Mother     Arthritis Mother     Heart disease  "Mother     Dementia Mother          at 85 yrs. old    Lung cancer Father     Cancer Father     Hyperlipidemia Sister     Hypertension Sister      Social History     Socioeconomic History    Marital status:      Spouse name: Ashu    Number of children: 2   Tobacco Use    Smoking status: Former     Packs/day: 1.00     Years: 20.00     Pack years: 20.00     Types: Cigarettes     Start date: 1961     Quit date: 1983     Years since quittin.7     Passive exposure: Past    Smokeless tobacco: Never   Vaping Use    Vaping Use: Never used   Substance and Sexual Activity    Alcohol use: Not Currently     Comment: rare     Drug use: No    Sexual activity: Not Currently     Partners: Male     Birth control/protection: Tubal ligation       Physical Examination:  Vital Signs: /68   Ht 175.3 cm (69\")   Wt 98.4 kg (217 lb)   BMI 32.05 kg/m²     General Appearance: alert, appears stated age, and cooperative  Breasts: Not performed.  Abdomen: no masses, no hepatomegaly, no splenomegaly, soft non-tender, no guarding, and no rebound tenderness  Pelvic: Clinical staff was present for exam  External genitalia:  normal appearance of the external genitalia including Bartholin's and [place]'s glands.  :  urethral meatus normal;  Vaginal:  atrophic mucosal changes are present;  Cervix:  normal appearance.  Uterus:  normal size, shape and consistency.  Adnexa:  non palpable bilaterally.  Cystocele GRADE 2  Rectocele GRADE 2  Uterine GRADE 2  Pessary was removed, cleaned, and reinserted without difficulty    Data Review:  The following data was reviewed by: Rafia Owens MD on 2023:     Labs:  Comprehensive Metabolic Panel (2023 15:54)  CBC & Differential (2023 15:54)  Imaging:    Medical Records:  None    Assessment and Plan   1. Rectocele  There are no changes noted on examination.  Patient desires to continue with pessary for management of her symptoms.    2. Cystocele, midline  There are " no changes noted on examination.  Patient desires to continue with her incontinence dish pessary as noted.    3. Encounter for pessary maintenance  The patient's pessary was removed, cleaned, and reinserted.  Instructions and precautions were given.  The patient is to follow up as discussed.     4. Urinary tract infection symptoms  We will send urine for clean-catch UA culture and sensitivity is noted.  - Urinalysis With Microscopic - Urine, Clean Catch  - Urine Culture - Urine, Urine, Clean Catch    5. Urinary incontinence, unspecified type  Patient desires to continue with her incontinence dish pessary for management of her symptoms.    6. Uterine prolapse  There are no changes noted on examination.  Patient desires to continue with pessary for management of her symptoms.    7. Constipation, unspecified constipation type  Patient is to continue the use of her MiraLAX and Metamucil as discussed.    8. Postmenopausal atrophic vaginitis  Patient is to continue her estrogen cream to apply to the periurethral area as well as vaginal introitus.    Follow Up/Instructions:  Follow up as noted.  Patient was given instructions and counseling regarding her condition or for health maintenance advice. Please see specific information pulled into the AVS if appropriate.     Note: Speech recognition transcription software may have been used to dictate portions of this document.  An attempt at proofreading has been made though minor errors in transcription may still be present.    This note was electronically signed.  Rafia Owens M.D.

## 2023-09-08 LAB
APPEARANCE UR: ABNORMAL
BACTERIA #/AREA URNS HPF: ABNORMAL /HPF
BACTERIA UR CULT: NORMAL
BACTERIA UR CULT: NORMAL
BILIRUB UR QL STRIP: NEGATIVE
CASTS URNS MICRO: ABNORMAL
COLOR UR: YELLOW
EPI CELLS #/AREA URNS HPF: ABNORMAL /HPF
GLUCOSE UR QL STRIP: NEGATIVE
HGB UR QL STRIP: NEGATIVE
KETONES UR QL STRIP: NEGATIVE
LEUKOCYTE ESTERASE UR QL STRIP: ABNORMAL
NITRITE UR QL STRIP: NEGATIVE
PH UR STRIP: 6.5 [PH] (ref 5–8)
PROT UR QL STRIP: ABNORMAL
RBC #/AREA URNS HPF: ABNORMAL /HPF
SP GR UR STRIP: 1.02 (ref 1–1.03)
UROBILINOGEN UR STRIP-MCNC: ABNORMAL MG/DL
WBC #/AREA URNS HPF: ABNORMAL /HPF

## 2023-09-21 NOTE — PRE-PROCEDURE INSTRUCTIONS
PAT phone history completed with pt for upcoming procedure on  9/25/23, with Dr Shannon.    PAT PASS GIVEN/REVIEWED WITH PT.  VERBALIZED UNDERSTANDING OF THE FOLLOWING:  DO NOT EAT, DRINK, SMOKE, USE SMOKELESS TOBACCO OR CHEW GUM AFTER MIDNIGHT THE NIGHT BEFORE SURGERY.  THIS ALSO INCLUDES HARD CANDIES AND MINTS.    DO NOT SHAVE THE AREA TO BE OPERATED ON AT LEAST 48 HOURS PRIOR TO THE PROCEDURE.  DO NOT WEAR MAKE UP OR NAIL POLISH.  DO NOT LEAVE IN ANY PIERCING OR WEAR JEWELRY THE DAY OF SURGERY.      DO NOT USE ADHESIVES IF YOU WEAR DENTURES.    DO NOT WEAR EYE CONTACTS; BRING IN YOUR GLASSES.    ONLY TAKE MEDICATION THE MORNING OF YOUR PROCEDURE IF INSTRUCTED BY YOUR SURGEON WITH ENOUGH WATER TO SWALLOW THE MEDICATION.  IF YOUR SURGEON DID NOT SPECIFY WHICH MEDICATIONS TO TAKE, YOU WILL NEED TO CALL THEIR OFFICE FOR FURTHER INSTRUCTIONS AND DO AS THEY INSTRUCT.    LEAVE ANYTHING YOU CONSIDER VALUABLE AT HOME.    YOU WILL NEED TO ARRANGE FOR SOMEONE TO DRIVE YOU HOME AFTER SURGERY.  IT IS RECOMMENDED THAT YOU DO NOT DRIVE, WORK, DRINK ALCOHOL OR MAKE MAJOR DECISIONS FOR AT LEAST 24 HOURS AFTER YOUR PROCEDURE IS COMPLETE.      THE DAY OF YOUR PROCEDURE, BRING IN THE FOLLOWING IF APPLICABLE:   PICTURE ID AND INSURANCE/MEDICARE OR MEDICAID CARDS/ANY CO-PAY THAT MAY BE DUE   COPY OF ADVANCED DIRECTIVE/LIVING WILL/POWER OR    CPAP/BIPAP/INHALERS   SKIN PREP SHEET   YOUR PREADMISSION TESTING PASS (IF NOT A PHONE HISTORY)    Medication instructions given to pt by RN per anesthesia protocol.  Pt referred back to surgeon for further instructions if he/she is on any blood thinners.

## 2023-09-25 ENCOUNTER — ANESTHESIA EVENT (OUTPATIENT)
Dept: PERIOP | Facility: HOSPITAL | Age: 78
End: 2023-09-25

## 2023-09-25 ENCOUNTER — HOSPITAL ENCOUNTER (OUTPATIENT)
Facility: HOSPITAL | Age: 78
Setting detail: HOSPITAL OUTPATIENT SURGERY
Discharge: HOME | End: 2023-09-25
Attending: OPHTHALMOLOGY | Admitting: OPHTHALMOLOGY
Payer: MEDICARE

## 2023-09-25 ENCOUNTER — ANESTHESIA (OUTPATIENT)
Dept: PERIOP | Facility: HOSPITAL | Age: 78
End: 2023-09-25

## 2023-09-25 VITALS
BODY MASS INDEX: 31.4 KG/M2 | HEIGHT: 69 IN | DIASTOLIC BLOOD PRESSURE: 60 MMHG | OXYGEN SATURATION: 97 % | SYSTOLIC BLOOD PRESSURE: 157 MMHG | TEMPERATURE: 98.7 F | WEIGHT: 212 LBS | RESPIRATION RATE: 20 BRPM | HEART RATE: 54 BPM

## 2023-09-25 PROCEDURE — V2632 POST CHMBR INTRAOCULAR LENS: HCPCS | Performed by: OPHTHALMOLOGY

## 2023-09-25 PROCEDURE — 25010000002 EPINEPHRINE PER 0.1 MG: Performed by: OPHTHALMOLOGY

## 2023-09-25 PROCEDURE — 25010000002 MIDAZOLAM PER 1MG: Performed by: NURSE ANESTHETIST, CERTIFIED REGISTERED

## 2023-09-25 DEVICE — LENS MONOFOCAL IQ CC60WF200: Type: IMPLANTABLE DEVICE | Site: POSTERIOR CHAMBER | Status: FUNCTIONAL

## 2023-09-25 RX ORDER — TETRACAINE HYDROCHLORIDE 5 MG/ML
SOLUTION OPHTHALMIC AS NEEDED
Status: DISCONTINUED | OUTPATIENT
Start: 2023-09-25 | End: 2023-09-25 | Stop reason: HOSPADM

## 2023-09-25 RX ORDER — TETRACAINE HYDROCHLORIDE 5 MG/ML
1 SOLUTION OPHTHALMIC SEE ADMIN INSTRUCTIONS
Status: DISCONTINUED | OUTPATIENT
Start: 2023-09-25 | End: 2023-09-25 | Stop reason: HOSPADM

## 2023-09-25 RX ORDER — MIDAZOLAM HYDROCHLORIDE 2 MG/2ML
INJECTION, SOLUTION INTRAMUSCULAR; INTRAVENOUS AS NEEDED
Status: DISCONTINUED | OUTPATIENT
Start: 2023-09-25 | End: 2023-09-25 | Stop reason: SURG

## 2023-09-25 RX ORDER — POLYMYXIN B SULFATE AND TRIMETHOPRIM 1; 10000 MG/ML; [USP'U]/ML
SOLUTION OPHTHALMIC AS NEEDED
Status: DISCONTINUED | OUTPATIENT
Start: 2023-09-25 | End: 2023-09-25 | Stop reason: HOSPADM

## 2023-09-25 RX ORDER — PREDNISOLONE ACETATE 10 MG/ML
1 SUSPENSION/ DROPS OPHTHALMIC SEE ADMIN INSTRUCTIONS
Status: DISCONTINUED | OUTPATIENT
Start: 2023-09-25 | End: 2023-09-25 | Stop reason: HOSPADM

## 2023-09-25 RX ORDER — CYCLOPENT/TROPIC/PHEN/KETR/WAT 1%-1%-2.5%
1 DROPS (EA) OPHTHALMIC (EYE)
Status: COMPLETED | OUTPATIENT
Start: 2023-09-25 | End: 2023-09-25

## 2023-09-25 RX ORDER — POLYMYXIN B SULFATE AND TRIMETHOPRIM 1; 10000 MG/ML; [USP'U]/ML
1 SOLUTION OPHTHALMIC SEE ADMIN INSTRUCTIONS
Status: DISCONTINUED | OUTPATIENT
Start: 2023-09-25 | End: 2023-09-25 | Stop reason: HOSPADM

## 2023-09-25 RX ORDER — PREDNISOLONE ACETATE 10 MG/ML
SUSPENSION/ DROPS OPHTHALMIC AS NEEDED
Status: DISCONTINUED | OUTPATIENT
Start: 2023-09-25 | End: 2023-09-25 | Stop reason: HOSPADM

## 2023-09-25 RX ORDER — SODIUM CHLORIDE 0.9 % (FLUSH) 0.9 %
10 SYRINGE (ML) INJECTION AS NEEDED
Status: DISCONTINUED | OUTPATIENT
Start: 2023-09-25 | End: 2023-09-25 | Stop reason: HOSPADM

## 2023-09-25 RX ORDER — BALANCED SALT SOLUTION 6.4; .75; .48; .3; 3.9; 1.7 MG/ML; MG/ML; MG/ML; MG/ML; MG/ML; MG/ML
SOLUTION OPHTHALMIC AS NEEDED
Status: DISCONTINUED | OUTPATIENT
Start: 2023-09-25 | End: 2023-09-25 | Stop reason: HOSPADM

## 2023-09-25 RX ORDER — SODIUM CHLORIDE, SODIUM LACTATE, POTASSIUM CHLORIDE, CALCIUM CHLORIDE 600; 310; 30; 20 MG/100ML; MG/100ML; MG/100ML; MG/100ML
1000 INJECTION, SOLUTION INTRAVENOUS CONTINUOUS
Status: DISCONTINUED | OUTPATIENT
Start: 2023-09-25 | End: 2023-09-25 | Stop reason: HOSPADM

## 2023-09-25 RX ADMIN — TETRACAINE HYDROCHLORIDE 1 DROP: 5 SOLUTION OPHTHALMIC at 10:30

## 2023-09-25 RX ADMIN — Medication 1 DROP: at 10:41

## 2023-09-25 RX ADMIN — Medication 1 DROP: at 10:36

## 2023-09-25 RX ADMIN — SODIUM CHLORIDE, POTASSIUM CHLORIDE, SODIUM LACTATE AND CALCIUM CHLORIDE 1000 ML: 600; 310; 30; 20 INJECTION, SOLUTION INTRAVENOUS at 10:35

## 2023-09-25 RX ADMIN — Medication 1 DROP: at 10:31

## 2023-09-25 RX ADMIN — TETRACAINE HYDROCHLORIDE 1 DROP: 5 SOLUTION OPHTHALMIC at 10:29

## 2023-09-25 RX ADMIN — MIDAZOLAM HYDROCHLORIDE 2 MG: 1 INJECTION, SOLUTION INTRAMUSCULAR; INTRAVENOUS at 11:31

## 2023-09-25 NOTE — DISCHARGE INSTRUCTIONS
[facility] [address] [place] [address]  PH: [phone]  FAX: [phone]      Post - Operative Instructions for Dr. Shannon's Cataract Patients      1.  Use your drops as prescribed, wait 5 minutes between each drop.  Eye drops are to be used as instructed during the daytime only. Do not get up in the night to use eye drops.  2.  Continue glaucoma eye drops in both eyes post op.  3.  Securely tape the protective shield over the operated eye at bedtime for the FIRST week only.  4.  Take your regular non-eye medications and continue any eye medications for the  non-operative eye.  5.  For the first week, avoid bending or stooping and lifting anything heavier than 5 pounds.  Also, avoid any blow to the head or eye.  Avoid getting dirty water in the eye.  Avoid sleeping on the side of the operated eye or face.  6.  No driving until you are told to by your physician.  7.  If significant eye pain is not relieved by medication, or you have increased redness, swelling, pain or loss of vision or any symptoms you are unsure of call Dr. Shannon's office at [phone].      You should expect:    Slight Discomfort  Burning When Using Eye Drops  Blurred Vision and Dilated Pupil  Mild Redness  You Will Not Be Able To Read  Your Glasses May Not Work      PHYSICIAN TO CHOOSE THE APPROPRIATE MEDICATIONS TO BE SENT HOME WITH PATIENT    __x__  Prednisolone Acetate 1% (Pred Forte) ophthalmic solution    ____   Difluprednate (Durezol) .05% ophthalmic emulsion    ____   Vigamox(moxifloxacin) 0.5% ophthalmic solution    __x__   Polymyxin B/Trimethoprim solution    ____   Bacitracin Zinc and polymyxin B sulfate ophthalmic ointment   Start the following eyedrops today as soon as you are home:     Prednisolone Acetate 1% (Pred Forte) ophthalmic solution instill one drop every 2 hours  Polytrim instill one drop every 4 hours    Wait 5 mins between drops. Eye drops are to be used as  instructed during the daytime only. Do not get up in the night to use eye drops.    You may only remove the eye shield to administer eye medications (place shield back over eye after each medication administration); otherwise keep shield on at all times until office visit tomorrow.

## 2023-09-25 NOTE — ANESTHESIA POSTPROCEDURE EVALUATION
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          Post Anesthesia Care and Evaluation    Patient location during evaluation: bedside  Patient participation: complete - patient participated  Level of consciousness: awake and alert  Pain score: 0  Pain management: adequate    Airway patency: patent  Anesthetic complications: No anesthetic complications  PONV Status: none  Cardiovascular status: acceptable  Respiratory status: acceptable  Hydration status: acceptable

## 2023-09-25 NOTE — OP NOTE
PROCEDURE NOTE      Date of Procedure: 9/25/2023  Patient Name:  Marisol Muro  MRN: [id number]  YOB: 1945      PREOPERATIVE DIAGNOSIS:  Visually significant combined form of senile cataract of the Left eye interfering with activities of daily living.    INDICATIONS FOR THE PROCEDURE:  Visually significant combined form of senile cataract of the Left eye interfering with activities of daily living.    POSTOPERATIVE DIAGNOSIS:  Visually significant combined form of senile cataract of the Left eye interfering with activities of daily living. Hard lens.    SURGEON:  Astrid Shannon M.D.    NAME OF PROCEDURE:  Left cataract extraction with posterior chamber intraocular lens implant. Lens used: +   Implant Name Type Inv. Item Serial No.  Lot No. LRB No. Used Action   LENS MONOFOCAL IQ [id number] - [id number] - [id number] Implant LENS MONOFOCAL IQ [id number] RANDY  Left 1 Implanted     CDE: 26.64 (~20 in the bag)    ANESTHESIA:  Topical with MAC.    COMPLICATIONS:  None.    ESTIMATED BLOOD LOSS:  Less than 1cc.    DETAILS OF THE PROCEDURE:  After receiving appropriate informed consent and confirming and marking the eye to be operated upon, the patient was wheeled into the operating room. After confirming adequate anesthesia, the patient was prepped and draped in a standard sterile ophthalmic fashion. A lid speculum was then placed in the eye.  A 0.8 mm metal blade was then used to make two limbal paracenteses and the anterior chamber was reformed with Viscoat.  A 2.4 mm metal keratome was then used to make a temporal clear cornea tunneled incision.  A cystotome was used to puncture the anterior capsule and initiate a capsular flap.  Utrata forceps were used to complete a continuous curvilinear capsulorrhexis.  BSS on a Cardoso hydrodissection cannula was then used to hydrodissect and hydrodelineate the nucleus.  The nucleus was then phacoemulsified using a divide and conquer  technique.  CDE was 26.64%.  Remaining cortex was removed with bimanual I/A.  Posterior capsular polish was performed.  The capsular bag was then reformed with Provisc and a   Implant Name Type Inv. Item Serial No.  Lot No. LRB No. Used Action   LENS MONOFOCAL IQ [id number] - [id number] - [id number] Implant LENS MONOFOCAL IQ [id number] RANDY  Left 1 Implanted     lens implant, was then placed in the bag without event.  The Provisc was then removed with bimanual irrigation and aspiration and the anterior chamber reformed with BSS.  The wounds were hydrated as necessary to maintain a water tight anterior chamber. Intracameral Moxifloxacin 1mg/0.1ml was administered and 5% povidone iodine solution was placed on the ocular surface. At the conclusion of the procedure, the lid speculum was removed and the patient undraped.  A drop of Polytrim and prednisolone acetate 1%  and shield were placed over the eye.  The patient left the room in good condition having tolerated the procedure well.    Astrid Shannon MD

## 2023-10-17 RX ORDER — OMEPRAZOLE 20 MG/1
20 CAPSULE, DELAYED RELEASE ORAL DAILY
Qty: 90 CAPSULE | Refills: 3 | Status: SHIPPED | OUTPATIENT
Start: 2023-10-17

## 2023-10-30 ENCOUNTER — OFFICE VISIT (OUTPATIENT)
Dept: INTERNAL MEDICINE | Facility: CLINIC | Age: 78
End: 2023-10-30
Payer: MEDICARE

## 2023-10-30 VITALS
SYSTOLIC BLOOD PRESSURE: 127 MMHG | OXYGEN SATURATION: 93 % | DIASTOLIC BLOOD PRESSURE: 73 MMHG | WEIGHT: 216.8 LBS | BODY MASS INDEX: 32.11 KG/M2 | HEART RATE: 65 BPM | TEMPERATURE: 97.7 F | HEIGHT: 69 IN

## 2023-10-30 DIAGNOSIS — M06.9 RHEUMATOID ARTHRITIS, INVOLVING UNSPECIFIED SITE, UNSPECIFIED WHETHER RHEUMATOID FACTOR PRESENT: ICD-10-CM

## 2023-10-30 DIAGNOSIS — D50.9 IRON DEFICIENCY ANEMIA, UNSPECIFIED IRON DEFICIENCY ANEMIA TYPE: ICD-10-CM

## 2023-10-30 DIAGNOSIS — I10 ESSENTIAL HYPERTENSION: Primary | ICD-10-CM

## 2023-10-30 PROCEDURE — 3074F SYST BP LT 130 MM HG: CPT | Performed by: INTERNAL MEDICINE

## 2023-10-30 PROCEDURE — 3078F DIAST BP <80 MM HG: CPT | Performed by: INTERNAL MEDICINE

## 2023-10-30 PROCEDURE — 1159F MED LIST DOCD IN RCRD: CPT | Performed by: INTERNAL MEDICINE

## 2023-10-30 PROCEDURE — G0439 PPPS, SUBSEQ VISIT: HCPCS | Performed by: INTERNAL MEDICINE

## 2023-10-30 PROCEDURE — 1160F RVW MEDS BY RX/DR IN RCRD: CPT | Performed by: INTERNAL MEDICINE

## 2023-10-30 PROCEDURE — 1170F FXNL STATUS ASSESSED: CPT | Performed by: INTERNAL MEDICINE

## 2023-10-30 RX ORDER — TOLTERODINE TARTRATE 1 MG/1
1 TABLET, EXTENDED RELEASE ORAL
Qty: 30 TABLET | Refills: 5 | Status: SHIPPED | OUTPATIENT
Start: 2023-10-30

## 2023-10-30 NOTE — PROGRESS NOTES
The ABCs of the Annual Wellness Visit  Subsequent Medicare Wellness Visit    Subjective      Marisol Muro is a 78 y.o. female who presents for a Subsequent Medicare Wellness Visit.    Review of Systems   Constitutional: Negative.  Negative for activity change, appetite change, fatigue and fever.   HENT:  Negative for congestion, ear discharge, ear pain and trouble swallowing.    Eyes:  Negative for photophobia and visual disturbance.   Respiratory:  Negative for cough and shortness of breath.    Cardiovascular:  Negative for chest pain and palpitations.   Gastrointestinal:  Negative for abdominal distention, abdominal pain, constipation, diarrhea, nausea and vomiting.   Endocrine: Negative.    Genitourinary:  Negative for dysuria, hematuria and urgency.   Musculoskeletal:  Positive for arthralgias and back pain. Negative for joint swelling and myalgias.   Skin:  Negative for color change and rash.   Allergic/Immunologic: Negative.    Neurological:  Negative for dizziness, weakness, light-headedness and headaches.   Hematological:  Negative for adenopathy. Does not bruise/bleed easily.   Psychiatric/Behavioral:  Positive for sleep disturbance. Negative for agitation, confusion and dysphoric mood. The patient is not nervous/anxious.         The following portions of the patient's history were reviewed and   updated as appropriate: allergies, current medications, past family history, past medical history, past social history, past surgical history, and problem list.    Compared to one year ago, the patient feels her physical   health is the same.    Compared to one year ago, the patient feels her mental   health is the same.    Recent Hospitalizations:  She was not admitted to the hospital during the last year.       Current Medical Providers:  Patient Care Team:  Mati Samson MD as PCP - General (Internal Medicine)  Walt Diamond MD as Consulting Physician (Rheumatology)    Outpatient Medications Prior  to Visit   Medication Sig Dispense Refill    acetaminophen (TYLENOL) 650 MG 8 hr tablet Take 1 tablet by mouth 2 (Two) Times a Day As Needed for Mild Pain .      acyclovir (ZOVIRAX) 400 MG tablet Take 1 tablet by mouth Every 4 (Four) Hours While Awake. Take no more than 5 doses a day. 50 tablet 3    aspirin 81 MG EC tablet Take 1 tablet by mouth Daily. 30 tablet 0    atorvastatin (LIPITOR) 40 MG tablet Take 1 tablet by mouth Every Night. 90 tablet 3    Cholecalciferol 50 MCG (2000 UT) capsule Take 1 capsule by mouth Daily.      clopidogrel (PLAVIX) 75 MG tablet Take 1 tablet by mouth Daily.      Diclofenac Sodium (VOLTAREN) 1 % gel gel Apply 2 g topically to the appropriate area as directed Every 12 (Twelve) Hours.      estradiol (ESTRACE) 0.1 MG/GM vaginal cream 0.5  grams intravaginal 2x/week (Patient taking differently: 2 g 2 (Two) Times a Week. 0.5  grams intravaginal 2x/week) 30 g 11    folic acid (FOLVITE) 1 MG tablet Take 1 tablet by mouth Daily.  0    hydroCHLOROthiazide (HYDRODIURIL) 12.5 MG tablet Take 1 tablet by mouth Daily. 90 tablet 3    hydroxychloroquine (PLAQUENIL) 200 MG tablet 1 tablet 2 (Two) Times a Day.  1    methotrexate 2.5 MG tablet Take 6 tablets by mouth 1 (One) Time Per Week. On Sunday      omeprazole (priLOSEC) 20 MG capsule Take 1 capsule by mouth Daily. 90 capsule 3     No facility-administered medications prior to visit.       No opioid medication identified on active medication list. I have reviewed chart for other potential  high risk medication/s and harmful drug interactions in the elderly.        Aspirin is on active medication list. Aspirin use is indicated based on review of current medical condition/s. Pros and cons of this therapy have been discussed today. Benefits of this medication outweigh potential harm.  Patient has been encouraged to continue taking this medication.  .      Patient Active Problem List   Diagnosis    Rheumatoid arthritis    Osteoarthritis    Essential  "hypertension    GERD (gastroesophageal reflux disease)    Esophageal stricture    Obesity (BMI 30-39.9)    Iron deficiency anemia    Hidradenitis    TIA (transient ischemic attack)     Advance Care Planning  Advance Directive is on file.  ACP discussion was held with the patient during this visit. Patient has an advance directive in EMR which is still valid.      Objective    Vitals:    10/30/23 1522 10/30/23 1529   BP: 153/74 127/73   Pulse: 65    Temp: 97.7 °F (36.5 °C)    SpO2: 93%    Weight: 98.3 kg (216 lb 12.8 oz)    Height: 175.3 cm (69.02\")      Estimated body mass index is 32 kg/m² as calculated from the following:    Height as of this encounter: 175.3 cm (69.02\").    Weight as of this encounter: 98.3 kg (216 lb 12.8 oz).    Physical Exam    BMI is >= 30 and <35. (Class 1 Obesity). The following options were offered after discussion;: exercise counseling/recommendations and nutrition counseling/recommendations      Does the patient have evidence of cognitive impairment?   No            HEALTH RISK ASSESSMENT    Smoking Status:  Social History     Tobacco Use   Smoking Status Former    Packs/day: 1.00    Years: 20.00    Additional pack years: 0.00    Total pack years: 20.00    Types: Cigarettes    Start date: 1961    Quit date: 1983    Years since quittin.8    Passive exposure: Past   Smokeless Tobacco Never     Alcohol Consumption:  Social History     Substance and Sexual Activity   Alcohol Use Not Currently    Comment: rare      Fall Risk Screen:    STEADI Fall Risk Assessment was completed, and patient is at MODERATE risk for falls. Assessment completed on:10/30/2023    Depression Screening:      10/30/2023     3:26 PM   PHQ-2/PHQ-9 Depression Screening   Little Interest or Pleasure in Doing Things 0-->not at all   Feeling Down, Depressed or Hopeless 0-->not at all   PHQ-9: Brief Depression Severity Measure Score 0       Health Habits and Functional and Cognitive Screening:      10/30/2023 "     3:26 PM   Functional & Cognitive Status   Do you have difficulty preparing food and eating? No   Do you have difficulty bathing yourself, getting dressed or grooming yourself? No   Do you have difficulty using the toilet? No   Do you have difficulty moving around from place to place? No   Do you have trouble with steps or getting out of a bed or a chair? No   Current Diet Well Balanced Diet   Dental Exam Up to date   Eye Exam Up to date   Exercise (times per week) 0 times per week   Current Exercises Include No Regular Exercise   Do you need help using the phone?  No   Are you deaf or do you have serious difficulty hearing?  Yes   Do you need help to go to places out of walking distance? No   Do you need help shopping? No   Do you need help preparing meals?  No   Do you need help with housework?  No   Do you need help with laundry? No   Do you need help taking your medications? No   Do you need help managing money? No   Do you ever drive or ride in a car without wearing a seat belt? No   Have you felt unusual stress, anger or loneliness in the last month? No   Who do you live with? Spouse   If you need help, do you have trouble finding someone available to you? No   Have you been bothered in the last four weeks by sexual problems? No   Do you have difficulty concentrating, remembering or making decisions? No       Age-appropriate Screening Schedule:  Refer to the list below for future screening recommendations based on patient's age, sex and/or medical conditions. Orders for these recommended tests are listed in the plan section. The patient has been provided with a written plan.    Health Maintenance   Topic Date Due    DXA SCAN  Never done    TDAP/TD VACCINES (1 - Tdap) Never done    ZOSTER VACCINE (2 of 3) 10/27/2014    COLORECTAL CANCER SCREENING  08/12/2017    INFLUENZA VACCINE  08/01/2023    LIPID PANEL  08/06/2023    COVID-19 Vaccine (4 - 2023-24 season) 09/01/2023    ANNUAL WELLNESS VISIT  10/27/2023     BMI FOLLOWUP  10/27/2023    Pneumococcal Vaccine 65+  Completed                CMS Preventative Services Quick Reference  Risk Factors Identified During Encounter:    Chronic Pain: Natural history and expected course discussed. Questions answered.  OTC analgesics as needed. Proper dosing schedule discussed.   Urinary Incontinence: Kegel exercises discussed and continue follow up with gyne    The above risks/problems have been discussed with the patient.  Pertinent information has been shared with the patient in the After Visit Summary.    Diagnoses and all orders for this visit:    1. Essential hypertension (Primary) stable with current meds and low-salt diet    2. Rheumatoid arthritis, involving unspecified site, unspecified whether rheumatoid factor present no recent exacerbation on Plaquenil and methotrexate.  Following up with rheumatology.  Recent lab work okay    3. Iron deficiency anemia, unspecified iron deficiency anemia type lab work done last month stable hemoglobin without evidence of anemia        Follow Up:   Next Medicare Wellness visit to be scheduled in 1 year.      An After Visit Summary and PPPS were made available to the patient.

## 2023-11-02 ENCOUNTER — OFFICE VISIT (OUTPATIENT)
Dept: OBSTETRICS AND GYNECOLOGY | Facility: CLINIC | Age: 78
End: 2023-11-02
Payer: MEDICARE

## 2023-11-02 VITALS
HEIGHT: 69 IN | DIASTOLIC BLOOD PRESSURE: 72 MMHG | WEIGHT: 216 LBS | SYSTOLIC BLOOD PRESSURE: 142 MMHG | BODY MASS INDEX: 31.99 KG/M2

## 2023-11-02 DIAGNOSIS — N81.6 RECTOCELE: ICD-10-CM

## 2023-11-02 DIAGNOSIS — Z46.89 ENCOUNTER FOR PESSARY MAINTENANCE: Primary | ICD-10-CM

## 2023-11-02 DIAGNOSIS — R10.9 FLANK PAIN: ICD-10-CM

## 2023-11-02 DIAGNOSIS — N95.2 POSTMENOPAUSAL ATROPHIC VAGINITIS: ICD-10-CM

## 2023-11-02 DIAGNOSIS — R39.9 URINARY TRACT INFECTION SYMPTOMS: ICD-10-CM

## 2023-11-02 DIAGNOSIS — N81.4 UTERINE PROLAPSE: ICD-10-CM

## 2023-11-02 DIAGNOSIS — N81.11 CYSTOCELE, MIDLINE: ICD-10-CM

## 2023-11-03 NOTE — PROGRESS NOTES
Chief Complaint  Pessary Check (2 month pessary follow up. Patient complains of back pain. )     History of Present Illness:  Patient is 78 y.o.  who presents to [facility] here for follow-up evaluation of her prolapse and pessary.  Patient reports she has been having low back pain over the last few days.  The patient does report however that her pain is worsened with movement.  She has never had any kidney stones.  She denies any heavy lifting or straining.  She denies any fever or chills.  She denies any dysuria.  She does continue to have urinary frequency and episodes of difficulty voiding.  She has been using her estrogen cream.  She denies any vaginal bleeding or spotting.  She continues to have episodes of constipation.  She did recently see her primary care provider Dr. Samson.    History  Past Medical History:   Diagnosis Date    Asymptomatic bacteriuria     Cataract     COVID-19 vaccine series completed     Moderna    Difficulty walking     Ear piercing     Elevated cholesterol     Esophageal stricture     GERD (gastroesophageal reflux disease)     Headache, tension-type     Hearing aid worn     History of prolapse of bladder     HL (hearing loss)     no hearing aids    Hyperlipidemia     Hypertension     Impaired fasting glucose     Obesity     Osteoarthritis     Rheumatoid arthritis     Shingles     Skin cancer     left ankle    Stroke     TIA    TIA (transient ischemic attack) 2022    Vision loss     Wears glasses      Current Outpatient Medications on File Prior to Visit   Medication Sig Dispense Refill    acetaminophen (TYLENOL) 650 MG 8 hr tablet Take 1 tablet by mouth 2 (Two) Times a Day As Needed for Mild Pain .      acyclovir (ZOVIRAX) 400 MG tablet Take 1 tablet by mouth Every 4 (Four) Hours While Awake. Take no more than 5 doses a day. 50 tablet 3    aspirin 81 MG EC tablet Take 1 tablet by mouth Daily. 30 tablet 0    atorvastatin (LIPITOR) 40 MG tablet Take  1 tablet by mouth Every Night. 90 tablet 3    Cholecalciferol 50 MCG (2000 UT) capsule Take 1 capsule by mouth Daily.      clopidogrel (PLAVIX) 75 MG tablet Take 1 tablet by mouth Daily.      Diclofenac Sodium (VOLTAREN) 1 % gel gel Apply 2 g topically to the appropriate area as directed Every 12 (Twelve) Hours.      estradiol (ESTRACE) 0.1 MG/GM vaginal cream 0.5  grams intravaginal 2x/week (Patient taking differently: 2 g 2 (Two) Times a Week. 0.5  grams intravaginal 2x/week) 30 g 11    folic acid (FOLVITE) 1 MG tablet Take 1 tablet by mouth Daily.  0    hydroCHLOROthiazide (HYDRODIURIL) 12.5 MG tablet Take 1 tablet by mouth Daily. 90 tablet 3    hydroxychloroquine (PLAQUENIL) 200 MG tablet 1 tablet 2 (Two) Times a Day.  1    methotrexate 2.5 MG tablet Take 6 tablets by mouth 1 (One) Time Per Week. On Sunday      omeprazole (priLOSEC) 20 MG capsule Take 1 capsule by mouth Daily. 90 capsule 3    tolterodine (Detrol) 1 MG tablet Take 1 tablet by mouth every night at bedtime. 30 tablet 5     No current facility-administered medications on file prior to visit.     Allergies   Allergen Reactions    Latex Rash    Tramadol Nausea Only and Other (See Comments)     GI upset    Wound Dressing Adhesive Other (See Comments)     slight redness at tape site     Past Surgical History:   Procedure Laterality Date    CATARACT EXTRACTION W/ INTRAOCULAR LENS IMPLANT Right 8/28/2023    Procedure: CATARACT PHACO EXTRACTION WITH INTRAOCULAR LENS IMPLANT RIGHT;  Surgeon: Astrid Shannon MD;  Location: [facility];  Service: Ophthalmology;  Laterality: Right;    CATARACT EXTRACTION W/ INTRAOCULAR LENS IMPLANT Left 9/25/2023    Procedure: CATARACT PHACO EXTRACTION WITH INTRAOCULAR LENS IMPLANT LEFT;  Surgeon: Astrid Shannon MD;  Location: [facility];  Service: Ophthalmology;  Laterality: Left;    COLONOSCOPY      ENDOSCOPY      ESOPHAGEAL DILATATION      GROIN HIDRADENITIS EXCISION Right 10/29/2021    Procedure: GROIN HIDRADENITIS EXCISION  "with excision of right leg mass;  Surgeon: Tanmay Clements MD;  Location: [facility];  Service: General;  Laterality: Right;    JOINT REPLACEMENT      LAPAROSCOPIC TUBAL LIGATION      REPLACEMENT TOTAL KNEE BILATERAL      ,     SKIN BIOPSY      SKIN CANCER EXCISION      TOTAL HIP ARTHROPLASTY  2014    right     Family History   Problem Relation Age of Onset    Stroke Mother     Arthritis Mother     Heart disease Mother     Dementia Mother          at 85 yrs. old    Lung cancer Father     Cancer Father     Hyperlipidemia Sister     Hypertension Sister      Social History     Socioeconomic History    Marital status:      Spouse name: [facility]    Number of children: 2   Tobacco Use    Smoking status: Former     Packs/day: 1.00     Years: 20.00     Additional pack years: 0.00     Total pack years: 20.00     Types: Cigarettes     Start date: 1961     Quit date: 1983     Years since quittin.8     Passive exposure: Past    Smokeless tobacco: Never   Vaping Use    Vaping Use: Never used   Substance and Sexual Activity    Alcohol use: Not Currently     Comment: rare     Drug use: No    Sexual activity: Defer       Physical Examination:  Vital Signs: /72   Ht 175.3 cm (69\")   Wt 98 kg (216 lb)   BMI 31.90 kg/m²     General Appearance: alert, appears stated age, and cooperative  Breasts: Not performed.  Abdomen: no masses, no hepatomegaly, no splenomegaly, soft non-tender, no guarding, and no rebound tenderness  No CVA tenderness  Pelvic: Clinical staff was present for exam  External genitalia:  normal appearance of the external genitalia including Bartholin's and [place]'s glands.  :  urethral meatus normal;  Vaginal:  atrophic mucosal changes are present; no visible lesions seen.  No granulation tissue or blood noted.  Cervix:  normal appearance.  Uterus:  normal size, shape and consistency.  Adnexa:  non palpable bilaterally.  Cystocele GRADE 2  Rectocele GRADE 2  Uterine GRADE " 2  Pessary was removed, cleaned, and reinserted without difficulty    Data Review:  The following data was reviewed by: Rafia Owens MD on 11/02/2023:     Labs:  Urinalysis With Microscopic - Urine, Clean Catch (09/06/2023 13:13)  Urine Culture - Urine, Urine, Clean Catch (09/06/2023 13:13)  Microscopic Examination - (09/06/2023 13:13)  Imaging:    Medical Records:  Progress Notes by Mati Samson MD (10/30/2023 15:00)     Assessment and Plan   1. Rectocele  There are no changes noted on examination.  Patient desires to continue with her currently prescribed incontinence dish pessary.  Instructions and precautions are given.  Patient is to follow-up as discussed.    2. Cystocele, midline  There are no changes noted on examination.  Patient desires to continue with her pessary for management of her symptoms.    3. Encounter for pessary maintenance  The patient's pessary was removed, cleaned, and reinserted.  Instructions and precautions were given.  The patient is to follow up as discussed.     4. Urinary tract infection symptoms  We will send urine for clean-catch UA culture and sensitivity.  Patient is to call for her results.  - Urinalysis With Microscopic - Urine, Clean Catch  - Urine Culture - Urine, Urine, Clean Catch    5. Uterine prolapse  There are no changes noted on examination.  Patient desires to continue with her currently prescribed incontinence dish pessary for management of her symptoms.  Instructions and precautions are given.  Patient is to follow-up in 2 months.    6. Postmenopausal atrophic vaginitis  Patient is to continue the use of her estrogen cream as previously given.  Instructions and precautions have been given.    7. Flank pain  We will send urine for clean-catch UA culture and sensitivity.  Patient's pain may be musculoskeletal is worsened with movement.  Patient is instructed to go to the emergency room if worsening of her symptoms.  If continued symptoms and no improvement she is  to call for possible CT scan for further evaluation.  - Urinalysis With Microscopic - Urine, Clean Catch  - Urine Culture - Urine, Urine, Clean Catch    Follow Up/Instructions:  Follow up as noted.  Patient was given instructions and counseling regarding her condition or for health maintenance advice. Please see specific information pulled into the AVS if appropriate.     Note: Speech recognition transcription software may have been used to dictate portions of this document.  An attempt at proofreading has been made though minor errors in transcription may still be present.    This note was electronically signed.  Rafia Owens M.D.

## 2023-11-04 LAB
APPEARANCE UR: CLEAR
BACTERIA #/AREA URNS HPF: ABNORMAL /HPF
BACTERIA UR CULT: NORMAL
BACTERIA UR CULT: NORMAL
BILIRUB UR QL STRIP: NEGATIVE
CASTS URNS MICRO: ABNORMAL
COLOR UR: YELLOW
EPI CELLS #/AREA URNS HPF: ABNORMAL /HPF
GLUCOSE UR QL STRIP: NEGATIVE
HGB UR QL STRIP: ABNORMAL
KETONES UR QL STRIP: NEGATIVE
LEUKOCYTE ESTERASE UR QL STRIP: ABNORMAL
NITRITE UR QL STRIP: NEGATIVE
PH UR STRIP: 6.5 [PH] (ref 5–8)
PROT UR QL STRIP: NEGATIVE
RBC #/AREA URNS HPF: ABNORMAL /HPF
SP GR UR STRIP: 1.01 (ref 1–1.03)
UROBILINOGEN UR STRIP-MCNC: ABNORMAL MG/DL
WBC #/AREA URNS HPF: ABNORMAL /HPF

## 2023-12-08 ENCOUNTER — OFFICE VISIT (OUTPATIENT)
Age: 78
End: 2023-12-08
Payer: MEDICARE

## 2023-12-08 VITALS
WEIGHT: 216.6 LBS | DIASTOLIC BLOOD PRESSURE: 80 MMHG | HEART RATE: 78 BPM | HEIGHT: 69 IN | BODY MASS INDEX: 32.08 KG/M2 | TEMPERATURE: 98.2 F | SYSTOLIC BLOOD PRESSURE: 156 MMHG | OXYGEN SATURATION: 95 %

## 2023-12-08 DIAGNOSIS — R05.9 COUGH IN ADULT: Primary | ICD-10-CM

## 2023-12-08 DIAGNOSIS — J40 BRONCHITIS: ICD-10-CM

## 2023-12-08 LAB
EXPIRATION DATE: NORMAL
FLUAV AG UPPER RESP QL IA.RAPID: NOT DETECTED
FLUBV AG UPPER RESP QL IA.RAPID: NOT DETECTED
INTERNAL CONTROL: NORMAL
Lab: NORMAL
SARS-COV-2 AG UPPER RESP QL IA.RAPID: NOT DETECTED

## 2023-12-08 PROCEDURE — 99213 OFFICE O/P EST LOW 20 MIN: CPT | Performed by: FAMILY MEDICINE

## 2023-12-08 PROCEDURE — 3079F DIAST BP 80-89 MM HG: CPT | Performed by: FAMILY MEDICINE

## 2023-12-08 PROCEDURE — 87428 SARSCOV & INF VIR A&B AG IA: CPT | Performed by: FAMILY MEDICINE

## 2023-12-08 PROCEDURE — 3077F SYST BP >= 140 MM HG: CPT | Performed by: FAMILY MEDICINE

## 2023-12-08 RX ORDER — BENZONATATE 100 MG/1
100 CAPSULE ORAL 3 TIMES DAILY PRN
Qty: 12 CAPSULE | Refills: 0 | Status: SHIPPED | OUTPATIENT
Start: 2023-12-08

## 2023-12-08 RX ORDER — PREDNISONE 5 MG/1
1 TABLET ORAL TAKE AS DIRECTED
Qty: 21 TABLET | Refills: 0 | Status: SHIPPED | OUTPATIENT
Start: 2023-12-08

## 2023-12-08 RX ORDER — AZITHROMYCIN 250 MG/1
TABLET, FILM COATED ORAL
Qty: 6 TABLET | Refills: 0 | Status: SHIPPED | OUTPATIENT
Start: 2023-12-08

## 2023-12-08 NOTE — PROGRESS NOTES
Follow Up Office Visit      Date: 2023   Patient Name: Marisol Muro  : 1945   MRN: [id number]     Chief Complaint:    Chief Complaint   Patient presents with    Cough     Started 2023    URI       History of Present Illness: Marisol Muro is a 78 y.o. female who is here today for cough, congestion, wheezing, chest pain.  Has had symptoms for about 5 days. Cough with significant production.    Subjective      Review of Systems:   Review of Systems   Constitutional:  Negative for fever.   Eyes:  Negative for discharge.   Respiratory:  Positive for cough.    Gastrointestinal:  Negative for nausea and vomiting.       I have reviewed the patients family history, social history, past medical history, past surgical history and have updated it as appropriate.     Medications:     Current Outpatient Medications:     acetaminophen (TYLENOL) 650 MG 8 hr tablet, Take 1 tablet by mouth 2 (Two) Times a Day As Needed for Mild Pain ., Disp: , Rfl:     acyclovir (ZOVIRAX) 400 MG tablet, Take 1 tablet by mouth Every 4 (Four) Hours While Awake. Take no more than 5 doses a day., Disp: 50 tablet, Rfl: 3    aspirin 81 MG EC tablet, Take 1 tablet by mouth Daily., Disp: 30 tablet, Rfl: 0    atorvastatin (LIPITOR) 40 MG tablet, Take 1 tablet by mouth Every Night., Disp: 90 tablet, Rfl: 3    Cholecalciferol 50 MCG (2000) capsule, Take 1 capsule by mouth Daily., Disp: , Rfl:     clopidogrel (PLAVIX) 75 MG tablet, Take 1 tablet by mouth Daily., Disp: , Rfl:     Diclofenac Sodium (VOLTAREN) 1 % gel gel, Apply 2 g topically to the appropriate area as directed Every 12 (Twelve) Hours., Disp: , Rfl:     estradiol (ESTRACE) 0.1 MG/GM vaginal cream, 0.5  grams intravaginal 2x/week (Patient taking differently: 2 applicators 2 (Two) Times a Week. 0.5  grams intravaginal 2x/week), Disp: 30 g, Rfl: 11    folic acid (FOLVITE) 1 MG tablet, Take 1 tablet by mouth Daily., Disp: , Rfl: 0    hydroCHLOROthiazide (HYDRODIURIL)  "12.5 MG tablet, Take 1 tablet by mouth Daily., Disp: 90 tablet, Rfl: 3    hydroxychloroquine (PLAQUENIL) 200 MG tablet, 1 tablet 2 (Two) Times a Day., Disp: , Rfl: 1    methotrexate 2.5 MG tablet, Take 6 tablets by mouth 1 (One) Time Per Week. On Sunday, Disp: , Rfl:     omeprazole (priLOSEC) 20 MG capsule, Take 1 capsule by mouth Daily., Disp: 90 capsule, Rfl: 3    tolterodine (Detrol) 1 MG tablet, Take 1 tablet by mouth every night at bedtime., Disp: 30 tablet, Rfl: 5    azithromycin (Zithromax Z-Jordan) 250 MG tablet, Take 2 tablets by mouth on day 1, then 1 tablet daily on days 2-5, Disp: 6 tablet, Rfl: 0    benzonatate (Tessalon Perles) 100 MG capsule, Take 1 capsule by mouth 3 (Three) Times a Day As Needed for Cough., Disp: 12 capsule, Rfl: 0    predniSONE 5 MG (21) tablet therapy pack dose pack, Take 1 tablet by mouth Take As Directed. Take as directed on package instructions., Disp: 21 tablet, Rfl: 0    Allergies:   Allergies   Allergen Reactions    Latex Rash    Tramadol Nausea Only and Other (See Comments)     GI upset    Wound Dressing Adhesive Other (See Comments)     slight redness at tape site       Objective     Physical Exam: Please see above  Vital Signs:   Vitals:    12/08/23 1134   BP: 156/80   Pulse: 78   Temp: 98.2 °F (36.8 °C)   SpO2: 95%   Weight: 98.2 kg (216 lb 9.6 oz)   Height: 175.3 cm (69.02\")     Body mass index is 31.97 kg/m².    Physical Exam  Constitutional:       Appearance: Normal appearance.   HENT:      Head: Normocephalic and atraumatic.      Mouth/Throat:      Lips: Pink.      Mouth: Mucous membranes are moist.   Cardiovascular:      Rate and Rhythm: Normal rate and regular rhythm.   Pulmonary:      Effort: Pulmonary effort is normal.      Breath sounds: Examination of the right-upper field reveals wheezing. Examination of the left-upper field reveals wheezing. Examination of the right-middle field reveals wheezing. Examination of the left-middle field reveals wheezing. Examination " of the right-lower field reveals wheezing. Examination of the left-lower field reveals wheezing.   Musculoskeletal:      Cervical back: Full passive range of motion without pain.   Neurological:      Mental Status: She is alert.         Procedures    Results:   Labs:   Hemoglobin A1C   Date Value Ref Range Status   08/05/2022 5.50 4.80 - 5.60 % Final     TSH   Date Value Ref Range Status   08/05/2022 1.670 0.270 - 4.200 uIU/mL Final        POCT Results (if applicable):   Results for orders placed or performed in visit on 12/08/23   POCT SARS-CoV-2 Antigen JOLYNN + Flu    Specimen: Swab   Result Value Ref Range    SARS Antigen Not Detected Not Detected, Presumptive Negative    Influenza A Antigen JOLYNN Not Detected Not Detected    Influenza B Antigen JOLYNN Not Detected Not Detected    Internal Control Passed Passed    Lot Number [id number]     Expiration Date 20,241,103        Imaging:   No valid procedures specified.         Assessment / Plan      Assessment/Plan:   Diagnoses and all orders for this visit:    1. Cough in adult (Primary)  -     POCT SARS-CoV-2 Antigen JOLYNN + Flu  -     benzonatate (Tessalon Perles) 100 MG capsule; Take 1 capsule by mouth 3 (Three) Times a Day As Needed for Cough.  Dispense: 12 capsule; Refill: 0    2. Bronchitis  -     predniSONE 5 MG (21) tablet therapy pack dose pack; Take 1 tablet by mouth Take As Directed. Take as directed on package instructions.  Dispense: 21 tablet; Refill: 0  -     azithromycin (Zithromax Z-Jordan) 250 MG tablet; Take 2 tablets by mouth on day 1, then 1 tablet daily on days 2-5  Dispense: 6 tablet; Refill: 0               Vaccine Counseling:      Follow Up:   No follow-ups on file.      Jesus Alberto Townsend, DO   [facility]

## 2024-02-15 ENCOUNTER — OFFICE VISIT (OUTPATIENT)
Dept: OBSTETRICS AND GYNECOLOGY | Facility: CLINIC | Age: 79
End: 2024-02-15
Payer: MEDICARE

## 2024-02-15 VITALS
WEIGHT: 214 LBS | BODY MASS INDEX: 31.7 KG/M2 | SYSTOLIC BLOOD PRESSURE: 136 MMHG | HEIGHT: 69 IN | DIASTOLIC BLOOD PRESSURE: 74 MMHG

## 2024-02-15 DIAGNOSIS — N81.11 CYSTOCELE, MIDLINE: ICD-10-CM

## 2024-02-15 DIAGNOSIS — N93.9 VAGINAL BLEEDING: ICD-10-CM

## 2024-02-15 DIAGNOSIS — Z46.89 ENCOUNTER FOR PESSARY MAINTENANCE: Primary | ICD-10-CM

## 2024-02-15 DIAGNOSIS — R32 URINARY INCONTINENCE, UNSPECIFIED TYPE: ICD-10-CM

## 2024-02-15 DIAGNOSIS — N95.2 POSTMENOPAUSAL ATROPHIC VAGINITIS: ICD-10-CM

## 2024-02-15 DIAGNOSIS — N81.4 UTERINE PROLAPSE: ICD-10-CM

## 2024-02-15 DIAGNOSIS — N81.6 RECTOCELE: ICD-10-CM

## 2024-02-15 PROCEDURE — 3075F SYST BP GE 130 - 139MM HG: CPT | Performed by: OBSTETRICS & GYNECOLOGY

## 2024-02-15 PROCEDURE — 1160F RVW MEDS BY RX/DR IN RCRD: CPT | Performed by: OBSTETRICS & GYNECOLOGY

## 2024-02-15 PROCEDURE — 99214 OFFICE O/P EST MOD 30 MIN: CPT | Performed by: OBSTETRICS & GYNECOLOGY

## 2024-02-15 PROCEDURE — 1159F MED LIST DOCD IN RCRD: CPT | Performed by: OBSTETRICS & GYNECOLOGY

## 2024-02-15 PROCEDURE — 3078F DIAST BP <80 MM HG: CPT | Performed by: OBSTETRICS & GYNECOLOGY

## 2024-02-19 NOTE — PROGRESS NOTES
Chief Complaint  Pessary Check (3 month pessary follow up, patient advised had spotting lasting x 2 days about 2 weeks ago. )     History of Present Illness:  Patient is 79 y.o.  who presents to [facility] here for follow-up evaluation of her pessary.  Patient reports having an episode of spotting for 2 days bright red in nature approximately 2 weeks ago.  She has not had any further bleeding since that time.  She does report good relief of the pressure and bulge per vagina with her pessary.  She denies any pain or discomfort.  She is on Plavix.  She has been using her estrogen cream.  She continues to have episodes of urinary incontinence.  She denies any changes with her bowel movements.  She denies any flank pain, fever, or chills.  She sees Dr. Samson for her primary care.  She has an annual scheduled with him.    History  Past Medical History:   Diagnosis Date    Asymptomatic bacteriuria     Cataract     COVID-19 vaccine series completed     Moderna    Difficulty walking     Ear piercing     Elevated cholesterol     Esophageal stricture     GERD (gastroesophageal reflux disease)     Headache, tension-type     Hearing aid worn     History of prolapse of bladder     HL (hearing loss)     no hearing aids    Hyperlipidemia     Hypertension     Impaired fasting glucose     Obesity     Osteoarthritis     Rheumatoid arthritis     Shingles     Skin cancer     left ankle    Stroke     TIA    TIA (transient ischemic attack) 2022    Vision loss     Wears glasses      Current Outpatient Medications on File Prior to Visit   Medication Sig Dispense Refill    acetaminophen (TYLENOL) 650 MG 8 hr tablet Take 1 tablet by mouth 2 (Two) Times a Day As Needed for Mild Pain .      acyclovir (ZOVIRAX) 400 MG tablet Take 1 tablet by mouth Every 4 (Four) Hours While Awake. Take no more than 5 doses a day. 50 tablet 3    aspirin 81 MG EC tablet Take 1 tablet by mouth Daily. 30 tablet 0     atorvastatin (LIPITOR) 40 MG tablet Take 1 tablet by mouth Every Night. 90 tablet 3    Cholecalciferol 50 MCG (2000 UT) capsule Take 1 capsule by mouth Daily.      clopidogrel (PLAVIX) 75 MG tablet Take 1 tablet by mouth Daily.      Diclofenac Sodium (VOLTAREN) 1 % gel gel Apply 2 g topically to the appropriate area as directed Every 12 (Twelve) Hours.      estradiol (ESTRACE) 0.1 MG/GM vaginal cream 0.5  grams intravaginal 2x/week (Patient taking differently: 2 applicators 2 (Two) Times a Week. 0.5  grams intravaginal 2x/week) 30 g 11    folic acid (FOLVITE) 1 MG tablet Take 1 tablet by mouth Daily.  0    hydroCHLOROthiazide (HYDRODIURIL) 12.5 MG tablet Take 1 tablet by mouth Daily. 90 tablet 3    hydroxychloroquine (PLAQUENIL) 200 MG tablet 1 tablet 2 (Two) Times a Day.  1    methotrexate 2.5 MG tablet Take 6 tablets by mouth 1 (One) Time Per Week. On Sunday      omeprazole (priLOSEC) 20 MG capsule Take 1 capsule by mouth Daily. 90 capsule 3    tolterodine (Detrol) 1 MG tablet Take 1 tablet by mouth every night at bedtime. 30 tablet 5     No current facility-administered medications on file prior to visit.     Allergies   Allergen Reactions    Latex Rash    Tramadol Nausea Only and Other (See Comments)     GI upset    Wound Dressing Adhesive Other (See Comments)     slight redness at tape site     Past Surgical History:   Procedure Laterality Date    CATARACT EXTRACTION W/ INTRAOCULAR LENS IMPLANT Right 8/28/2023    Procedure: CATARACT PHACO EXTRACTION WITH INTRAOCULAR LENS IMPLANT RIGHT;  Surgeon: Astrid Shannon MD;  Location: [facility] OR;  Service: Ophthalmology;  Laterality: Right;    CATARACT EXTRACTION W/ INTRAOCULAR LENS IMPLANT Left 9/25/2023    Procedure: CATARACT PHACO EXTRACTION WITH INTRAOCULAR LENS IMPLANT LEFT;  Surgeon: Astrid Shannon MD;  Location: [facility] OR;  Service: Ophthalmology;  Laterality: Left;    COLONOSCOPY      ENDOSCOPY      ESOPHAGEAL DILATATION      GROIN HIDRADENITIS EXCISION Right  "10/29/2021    Procedure: GROIN HIDRADENITIS EXCISION with excision of right leg mass;  Surgeon: Tanmay Clements MD;  Location: [facility];  Service: General;  Laterality: Right;    JOINT REPLACEMENT      LAPAROSCOPIC TUBAL LIGATION      REPLACEMENT TOTAL KNEE BILATERAL      ,     SKIN BIOPSY      SKIN CANCER EXCISION      TOTAL HIP ARTHROPLASTY  2014    right     Family History   Problem Relation Age of Onset    Stroke Mother     Arthritis Mother     Heart disease Mother     Dementia Mother          at 85 yrs. old    Lung cancer Father     Cancer Father     Hyperlipidemia Sister     Hypertension Sister      Social History     Socioeconomic History    Marital status:      Spouse name: [facility]    Number of children: 2   Tobacco Use    Smoking status: Former     Packs/day: 1.00     Years: 20.00     Additional pack years: 0.00     Total pack years: 20.00     Types: Cigarettes     Start date: 1961     Quit date: 1983     Years since quittin.1     Passive exposure: Past    Smokeless tobacco: Never   Vaping Use    Vaping Use: Never used   Substance and Sexual Activity    Alcohol use: Not Currently     Comment: rare     Drug use: No    Sexual activity: Defer       Physical Examination:  Vital Signs: /74   Ht 175.3 cm (69\")   Wt 97.1 kg (214 lb)   BMI 31.60 kg/m²     General Appearance: alert, appears stated age, and cooperative  Breasts: Not performed.  Abdomen: no masses, no hepatomegaly, no splenomegaly, soft non-tender, no guarding, and no rebound tenderness  Pelvic: Clinical staff was present for exam; pelvic examination was required for evaluation  External genitalia:  normal appearance of the external genitalia including Bartholin's and [place]'s glands.  :  urethral meatus normal;  Vaginal:  atrophic mucosal changes are present; no erosions or active bleeding seen.  Cervix:  normal appearance.  Uterus:  normal size, shape and consistency.  Adnexa:  non palpable " bilaterally.  Cystocele GRADE 2  Rectocele GRADE 2  Uterine GRADE 2  Pessary was removed, cleaned, and reinserted without difficulty    Data Review:  The following data was reviewed by: Rafia Owens MD on 02/15/2024:     Labs:  POCT SARS-CoV-2 Antigen JOLYNN + Flu (12/08/2023 11:59)   Imaging:    Medical Records:  None    Assessment and Plan   1. Rectocele  There are no changes noted on examination.  I have stressed to the patient the need to keep her stools soft and no straining with defecation or heavy lifting.    2. Cystocele, midline  There are no changes noted on examination.  Patient desires to continue with her currently prescribed pessary.  Instructions and precautions have been given.  Patient is to follow-up as discussed.    3. Encounter for pessary maintenance  The patient's pessary was removed, cleaned, and reinserted.  Instructions and precautions were given.  The patient is to follow up as discussed.     4. Uterine prolapse  There are no changes noted on examination.  Patient desires to continue with her currently prescribed pessary.  Instructions and precautions have been given.    5. Postmenopausal atrophic vaginitis  Patient is to continue the use of her estrogen cream as discussed.    6. Urinary incontinence, unspecified type  Patient has been instructed to apply her estrogen cream to the periurethral area.  Instructions and precautions have been given.  She desires to continue with pessary for management of her symptoms.    7. Vaginal bleeding  There are no erosions or lesions seen today.  Instructions and precautions have been given.  Patient is to call if continued symptoms as discussed.    Follow Up/Instructions:  Follow up as noted.  Patient was given instructions and counseling regarding her condition or for health maintenance advice. Please see specific information pulled into the AVS if appropriate.     Note: Speech recognition transcription software may have been used to dictate portions of  this document.  An attempt at proofreading has been made though minor errors in transcription may still be present.    This note was electronically signed.  Rafia Owens M.D.

## 2024-05-02 ENCOUNTER — OFFICE VISIT (OUTPATIENT)
Dept: INTERNAL MEDICINE | Facility: CLINIC | Age: 79
End: 2024-05-02
Payer: MEDICARE

## 2024-05-02 VITALS
WEIGHT: 207 LBS | OXYGEN SATURATION: 99 % | TEMPERATURE: 97.8 F | SYSTOLIC BLOOD PRESSURE: 147 MMHG | BODY MASS INDEX: 30.66 KG/M2 | DIASTOLIC BLOOD PRESSURE: 55 MMHG | HEIGHT: 69 IN | RESPIRATION RATE: 12 BRPM | HEART RATE: 70 BPM

## 2024-05-02 DIAGNOSIS — I10 ESSENTIAL HYPERTENSION: Primary | ICD-10-CM

## 2024-05-02 DIAGNOSIS — M06.9 RHEUMATOID ARTHRITIS, INVOLVING UNSPECIFIED SITE, UNSPECIFIED WHETHER RHEUMATOID FACTOR PRESENT: ICD-10-CM

## 2024-05-02 DIAGNOSIS — K21.9 GASTROESOPHAGEAL REFLUX DISEASE, UNSPECIFIED WHETHER ESOPHAGITIS PRESENT: ICD-10-CM

## 2024-05-02 PROCEDURE — 1159F MED LIST DOCD IN RCRD: CPT | Performed by: INTERNAL MEDICINE

## 2024-05-02 PROCEDURE — 3077F SYST BP >= 140 MM HG: CPT | Performed by: INTERNAL MEDICINE

## 2024-05-02 PROCEDURE — 3078F DIAST BP <80 MM HG: CPT | Performed by: INTERNAL MEDICINE

## 2024-05-02 PROCEDURE — 1160F RVW MEDS BY RX/DR IN RCRD: CPT | Performed by: INTERNAL MEDICINE

## 2024-05-02 PROCEDURE — G2211 COMPLEX E/M VISIT ADD ON: HCPCS | Performed by: INTERNAL MEDICINE

## 2024-05-02 PROCEDURE — 99214 OFFICE O/P EST MOD 30 MIN: CPT | Performed by: INTERNAL MEDICINE

## 2024-05-02 RX ORDER — TOLTERODINE 2 MG/1
2 CAPSULE, EXTENDED RELEASE ORAL DAILY
Qty: 90 CAPSULE | Refills: 3 | Status: SHIPPED | OUTPATIENT
Start: 2024-05-02

## 2024-05-02 NOTE — PROGRESS NOTES
"Subjective  Marisol Muro is a 79 y.o. female    HPI coming in for follow-up patient with history of rheumatoid arthritis gradually losing weight with dietary restrictions history of TIA for which she is on dual antiplatelet therapy history of reflux esophagitis no new complaints    The following portions of the patient's history were reviewed and updated as appropriate: allergies, current medications, past family history, past medical history, past social history, past surgical history, and problem list.     Review of Systems   Constitutional:  Positive for fatigue. Negative for activity change, appetite change and fever.   HENT:  Negative for congestion, ear discharge, ear pain and trouble swallowing.    Eyes:  Negative for photophobia and visual disturbance.   Respiratory:  Negative for cough and shortness of breath.    Cardiovascular:  Negative for chest pain and palpitations.   Gastrointestinal:  Negative for abdominal distention, abdominal pain, constipation, diarrhea, nausea and vomiting.   Endocrine: Negative.    Genitourinary:  Negative for dysuria, hematuria and urgency.   Musculoskeletal:  Positive for arthralgias. Negative for back pain, joint swelling and myalgias.   Skin:  Negative for color change and rash.   Allergic/Immunologic: Negative.    Neurological:  Negative for dizziness, weakness, light-headedness and headaches.   Hematological:  Negative for adenopathy. Does not bruise/bleed easily.   Psychiatric/Behavioral:  Positive for sleep disturbance. Negative for agitation, confusion and dysphoric mood. The patient is not nervous/anxious.        Visit Vitals  /55   Pulse 70   Temp 97.8 °F (36.6 °C) (Infrared)   Resp 12   Ht 175.3 cm (69\")   Wt 93.9 kg (207 lb)   SpO2 99%   BMI 30.57 kg/m²       Objective  Physical Exam  Constitutional:       General: She is not in acute distress.     Appearance: She is well-developed.   HENT:      Nose: Nose normal.   Eyes:      General: No scleral " icterus.     Conjunctiva/sclera: Conjunctivae normal.   Neck:      Thyroid: No thyromegaly.      Trachea: No tracheal deviation.   Cardiovascular:      Rate and Rhythm: Normal rate and regular rhythm.      Heart sounds: No murmur heard.     No friction rub.   Pulmonary:      Effort: No respiratory distress.      Breath sounds: No wheezing or rales.   Abdominal:      General: There is no distension.      Palpations: Abdomen is soft. There is no mass.      Tenderness: There is no abdominal tenderness. There is no guarding.   Musculoskeletal:         General: Deformity present. Normal range of motion.   Lymphadenopathy:      Cervical: No cervical adenopathy.   Skin:     General: Skin is warm and dry.      Findings: No erythema or rash.   Neurological:      Mental Status: She is alert and oriented to person, place, and time.      Cranial Nerves: No cranial nerve deficit.      Coordination: Coordination normal.      Deep Tendon Reflexes: Reflexes are normal and symmetric.   Psychiatric:         Behavior: Behavior normal.         Thought Content: Thought content normal.         Judgment: Judgment normal.       Diagnoses and all orders for this visit:    Essential hypertension stable on mild diuretic.  Consider discontinuing this as she continues to lose weight.  Follow BP readings at home    Rheumatoid arthritis, involving unspecified site, unspecified whether rheumatoid factor present stable with current meds reviewed lab work from her rheumatologist remains stable    Gastroesophageal reflux disease, unspecified whether esophagitis present continue with reflux precautions and proton pump inhibitors    Other orders  -     tolterodine LA (Detrol LA) 2 MG 24 hr capsule; Take 1 capsule by mouth Daily.              Answers submitted by the patient for this visit:  Other (Submitted on 4/25/2024)  Please describe your symptoms.: No symptoms  Have you had these symptoms before?: Yes  How long have you been having these  symptoms?: 1-4 days  Please list any medications you are currently taking for this condition.: None  Please describe any probable cause for these symptoms. : ??  Primary Reason for Visit (Submitted on 4/25/2024)  What is the primary reason for your visit?: Other

## 2024-05-14 ENCOUNTER — OFFICE VISIT (OUTPATIENT)
Dept: OBSTETRICS AND GYNECOLOGY | Facility: CLINIC | Age: 79
End: 2024-05-14
Payer: MEDICARE

## 2024-05-14 VITALS
WEIGHT: 206 LBS | DIASTOLIC BLOOD PRESSURE: 78 MMHG | HEIGHT: 69 IN | SYSTOLIC BLOOD PRESSURE: 140 MMHG | BODY MASS INDEX: 30.51 KG/M2

## 2024-05-14 DIAGNOSIS — N81.6 RECTOCELE: ICD-10-CM

## 2024-05-14 DIAGNOSIS — R39.9 URINARY TRACT INFECTION SYMPTOMS: ICD-10-CM

## 2024-05-14 DIAGNOSIS — N81.4 UTERINE PROLAPSE: ICD-10-CM

## 2024-05-14 DIAGNOSIS — R30.0 DYSURIA: ICD-10-CM

## 2024-05-14 DIAGNOSIS — N81.11 CYSTOCELE, MIDLINE: ICD-10-CM

## 2024-05-14 DIAGNOSIS — Z46.89 ENCOUNTER FOR PESSARY MAINTENANCE: Primary | ICD-10-CM

## 2024-05-14 DIAGNOSIS — R39.198 DIFFICULTY VOIDING: ICD-10-CM

## 2024-05-14 DIAGNOSIS — R39.15 URINARY URGENCY: ICD-10-CM

## 2024-05-14 PROCEDURE — 99214 OFFICE O/P EST MOD 30 MIN: CPT | Performed by: OBSTETRICS & GYNECOLOGY

## 2024-05-14 PROCEDURE — 1159F MED LIST DOCD IN RCRD: CPT | Performed by: OBSTETRICS & GYNECOLOGY

## 2024-05-14 PROCEDURE — 3078F DIAST BP <80 MM HG: CPT | Performed by: OBSTETRICS & GYNECOLOGY

## 2024-05-14 PROCEDURE — 3077F SYST BP >= 140 MM HG: CPT | Performed by: OBSTETRICS & GYNECOLOGY

## 2024-05-14 PROCEDURE — 1160F RVW MEDS BY RX/DR IN RCRD: CPT | Performed by: OBSTETRICS & GYNECOLOGY

## 2024-05-14 RX ORDER — NITROFURANTOIN 25; 75 MG/1; MG/1
100 CAPSULE ORAL 2 TIMES DAILY
Qty: 14 CAPSULE | Refills: 0 | Status: SHIPPED | OUTPATIENT
Start: 2024-05-14 | End: 2024-05-21

## 2024-05-14 NOTE — PROGRESS NOTES
Chief Complaint  Pessary Check (Follow up pessary check, no complaints. )     History of Present Illness:  Patient is 79 y.o.  who presents to [facility] here for follow-up evaluation of her prolapse as well as pessary.  Patient reports the pessary has stayed in place.  She does report good relief of the pressure and bulge per vagina.  She denies any vaginal bleeding or spotting.  She does report having perianal irritation.  She is also been having urinary urgency and urge incontinence.  She recently had a change in her Detrol to 2 mg.  Does report having dry eyes and dry mouth.  She has been having discomfort with urination.  She also reports at times having difficulty voiding.  She denies any changes in her bowel movements.  She sees Dr. Samson for her primary care.    History  Past Medical History:   Diagnosis Date    Asymptomatic bacteriuria     Cataract     COVID-19 vaccine series completed     Moderna    Difficulty walking     Ear piercing     Elevated cholesterol     Esophageal stricture     GERD (gastroesophageal reflux disease)     Headache, tension-type     Hearing aid worn     History of prolapse of bladder     HL (hearing loss)     no hearing aids    Hyperlipidemia     Hypertension     Impaired fasting glucose     Obesity     Osteoarthritis     Rheumatoid arthritis     Shingles     Skin cancer     left ankle    Stroke     TIA    TIA (transient ischemic attack) 2022    Vision loss     Wears glasses      Current Outpatient Medications on File Prior to Visit   Medication Sig Dispense Refill    acetaminophen (TYLENOL) 650 MG 8 hr tablet Take 1 tablet by mouth 2 (Two) Times a Day As Needed for Mild Pain .      acyclovir (ZOVIRAX) 400 MG tablet Take 1 tablet by mouth Every 4 (Four) Hours While Awake. Take no more than 5 doses a day. 50 tablet 3    aspirin 81 MG EC tablet Take 1 tablet by mouth Daily. 30 tablet 0    atorvastatin (LIPITOR) 40 MG tablet Take 1 tablet by mouth  Every Night. 90 tablet 3    Cholecalciferol 50 MCG (2000 UT) capsule Take 1 capsule by mouth Daily.      clopidogrel (PLAVIX) 75 MG tablet Take 1 tablet by mouth Daily.      Diclofenac Sodium (VOLTAREN) 1 % gel gel Apply 2 g topically to the appropriate area as directed Every 12 (Twelve) Hours.      estradiol (ESTRACE) 0.1 MG/GM vaginal cream 0.5  grams intravaginal 2x/week (Patient taking differently: 2 g 2 (Two) Times a Week. 0.5  grams intravaginal 2x/week) 30 g 11    folic acid (FOLVITE) 1 MG tablet Take 1 tablet by mouth Daily.  0    hydroCHLOROthiazide (HYDRODIURIL) 12.5 MG tablet Take 1 tablet by mouth Daily. 90 tablet 3    hydroxychloroquine (PLAQUENIL) 200 MG tablet 1 tablet 2 (Two) Times a Day.  1    methotrexate 2.5 MG tablet Take 6 tablets by mouth 1 (One) Time Per Week. On Sunday      omeprazole (priLOSEC) 20 MG capsule Take 1 capsule by mouth Daily. 90 capsule 3    tolterodine LA (Detrol LA) 2 MG 24 hr capsule Take 1 capsule by mouth Daily. 90 capsule 3     No current facility-administered medications on file prior to visit.     Allergies   Allergen Reactions    Latex Rash    Tramadol Nausea Only and Other (See Comments)     GI upset    Wound Dressing Adhesive Other (See Comments)     slight redness at tape site     Past Surgical History:   Procedure Laterality Date    CATARACT EXTRACTION W/ INTRAOCULAR LENS IMPLANT Right 8/28/2023    Procedure: CATARACT PHACO EXTRACTION WITH INTRAOCULAR LENS IMPLANT RIGHT;  Surgeon: Astrid Shannon MD;  Location: [facility] OR;  Service: Ophthalmology;  Laterality: Right;    CATARACT EXTRACTION W/ INTRAOCULAR LENS IMPLANT Left 9/25/2023    Procedure: CATARACT PHACO EXTRACTION WITH INTRAOCULAR LENS IMPLANT LEFT;  Surgeon: Astrid Shannon MD;  Location: [facility];  Service: Ophthalmology;  Laterality: Left;    COLONOSCOPY      ENDOSCOPY      ESOPHAGEAL DILATATION      GROIN HIDRADENITIS EXCISION Right 10/29/2021    Procedure: GROIN HIDRADENITIS EXCISION with excision of right  "leg mass;  Surgeon: Tanmay Clements MD;  Location: [facility];  Service: General;  Laterality: Right;    JOINT REPLACEMENT      LAPAROSCOPIC TUBAL LIGATION      REPLACEMENT TOTAL KNEE BILATERAL      ,     SKIN BIOPSY      SKIN CANCER EXCISION      TOTAL HIP ARTHROPLASTY  2014    right     Family History   Problem Relation Age of Onset    Stroke Mother     Arthritis Mother     Heart disease Mother     Dementia Mother          at 85 yrs. old    Lung cancer Father     Cancer Father     Hyperlipidemia Sister     Hypertension Sister      Social History     Socioeconomic History    Marital status:      Spouse name: [facility]    Number of children: 2   Tobacco Use    Smoking status: Former     Current packs/day: 0.00     Average packs/day: 1 pack/day for 21.6 years (21.6 ttl pk-yrs)     Types: Cigarettes     Start date: 1961     Quit date: 1983     Years since quittin.3     Passive exposure: Past    Smokeless tobacco: Never   Vaping Use    Vaping status: Never Used   Substance and Sexual Activity    Alcohol use: Not Currently     Comment: rare     Drug use: No    Sexual activity: Not Currently       Physical Examination:  Vital Signs: /78   Ht 175.3 cm (69\")   Wt 93.4 kg (206 lb)   BMI 30.42 kg/m²     General Appearance: alert, appears stated age, and cooperative  Breasts: Not performed.  Abdomen: no masses, no hepatomegaly, no splenomegaly, soft non-tender, no guarding, and no rebound tenderness  Pelvic: Clinical staff was present for exam; pelvic examination was required for evaluation  External genitalia:  normal appearance of the external genitalia including Bartholin's and [place]'s glands.  :  urethral meatus normal;  Vaginal:  atrophic mucosal changes are present;  Cervix:  normal appearance.  Uterus:  normal size, shape and consistency.  Adnexa:  non palpable bilaterally.  Cystocele GRADE 2  Rectocele GRADE 2  Uterine GRADE 2  Pessary was removed, cleaned, and reinserted without " difficulty    Data Review:  The following data was reviewed by: Rafia Owens MD on 05/14/2024:     Labs:    Imaging:    Medical Records:  Progress Notes by Mati Samson MD (05/02/2024 15:30)     Assessment and Plan   1. Cystocele, midline  There are no changes noted on examination.  Patient desires to continue with her currently prescribed incontinence dish pessary.  Patient is to follow-up as discussed.    2. Rectocele  There are no changes noted on examination.  Patient is to keep stools soft and no straining with defecation.    3. Encounter for pessary maintenance  The patient's pessary was removed, cleaned, and reinserted.  Instructions and precautions were given.  The patient is to follow up as discussed.     4. Uterine prolapse  There are no changes noted on examination.  Patient desires to continue with her incontinence dish pessary for management of her symptoms.  She is to follow-up as discussed.    5. Urinary tract infection symptoms  Will send urine for clean-catch UA culture and sensitivity.  - Urinalysis With Microscopic - Urine, Clean Catch  - Urine Culture - Urine, Urine, Clean Catch  - nitrofurantoin, macrocrystal-monohydrate, (Macrobid) 100 MG capsule; Take 1 capsule by mouth 2 (Two) Times a Day for 7 days.  Dispense: 14 capsule; Refill: 0    6. Dysuria  Prescription is given as noted.  Patient is to call for culture results.  - nitrofurantoin, macrocrystal-monohydrate, (Macrobid) 100 MG capsule; Take 1 capsule by mouth 2 (Two) Times a Day for 7 days.  Dispense: 14 capsule; Refill: 0    7. Difficulty voiding  Patient is to increase her p.o. fluids.  Prescription is given as noted.  She is to call for culture results.  - nitrofurantoin, macrocrystal-monohydrate, (Macrobid) 100 MG capsule; Take 1 capsule by mouth 2 (Two) Times a Day for 7 days.  Dispense: 14 capsule; Refill: 0    8. Urinary urgency  Patient is to continue with current dose of Detrol as discussed.  Plan pending response to  treatment.  - nitrofurantoin, macrocrystal-monohydrate, (Macrobid) 100 MG capsule; Take 1 capsule by mouth 2 (Two) Times a Day for 7 days.  Dispense: 14 capsule; Refill: 0    Follow Up/Instructions:  Follow up as noted.  Patient was given instructions and counseling regarding her condition or for health maintenance advice. Please see specific information pulled into the AVS if appropriate.     Note: Speech recognition transcription software may have been used to dictate portions of this document.  An attempt at proofreading has been made though minor errors in transcription may still be present.    This note was electronically signed.  Rafia Owens M.D.

## 2024-05-16 LAB
APPEARANCE UR: CLEAR
BACTERIA #/AREA URNS HPF: ABNORMAL /HPF
BACTERIA UR CULT: NORMAL
BACTERIA UR CULT: NORMAL
BILIRUB UR QL STRIP: NEGATIVE
CASTS URNS MICRO: ABNORMAL
COLOR UR: YELLOW
EPI CELLS #/AREA URNS HPF: ABNORMAL /HPF
GLUCOSE UR QL STRIP: NEGATIVE
HGB UR QL STRIP: NEGATIVE
KETONES UR QL STRIP: NEGATIVE
LEUKOCYTE ESTERASE UR QL STRIP: ABNORMAL
NITRITE UR QL STRIP: NEGATIVE
PH UR STRIP: 6.5 [PH] (ref 5–8)
PROT UR QL STRIP: ABNORMAL
RBC #/AREA URNS HPF: ABNORMAL /HPF
SP GR UR STRIP: 1.01 (ref 1–1.03)
UROBILINOGEN UR STRIP-MCNC: ABNORMAL MG/DL
WBC #/AREA URNS HPF: ABNORMAL /HPF

## 2024-05-17 PROBLEM — Z96.653 PRESENCE OF ARTIFICIAL KNEE JOINT, BILATERAL: Status: ACTIVE | Noted: 2024-05-17

## 2024-05-17 PROBLEM — M05.79 RHEUMATOID ARTHRITIS WITH RHEUMATOID FACTOR OF MULTIPLE SITES WITHOUT ORGAN OR SYSTEMS INVOLVEMENT: Status: ACTIVE | Noted: 2024-05-17

## 2024-05-17 PROBLEM — M85.80 OSTEOPENIA: Status: ACTIVE | Noted: 2024-05-17

## 2024-05-20 ENCOUNTER — OFFICE VISIT (OUTPATIENT)
Age: 79
End: 2024-05-20
Payer: MEDICARE

## 2024-05-20 VITALS
HEART RATE: 68 BPM | SYSTOLIC BLOOD PRESSURE: 110 MMHG | BODY MASS INDEX: 30.59 KG/M2 | WEIGHT: 206.5 LBS | DIASTOLIC BLOOD PRESSURE: 82 MMHG | TEMPERATURE: 97.8 F | HEIGHT: 69 IN

## 2024-05-20 DIAGNOSIS — Z96.641 HISTORY OF RIGHT HIP REPLACEMENT: ICD-10-CM

## 2024-05-20 DIAGNOSIS — D84.821 IMMUNOSUPPRESSION DUE TO DRUG THERAPY: ICD-10-CM

## 2024-05-20 DIAGNOSIS — M05.79 RHEUMATOID ARTHRITIS WITH RHEUMATOID FACTOR OF MULTIPLE SITES WITHOUT ORGAN OR SYSTEMS INVOLVEMENT: Primary | ICD-10-CM

## 2024-05-20 DIAGNOSIS — Z79.899 HIGH RISK MEDICATION USE: ICD-10-CM

## 2024-05-20 DIAGNOSIS — Z96.653 PRESENCE OF ARTIFICIAL KNEE JOINT, BILATERAL: ICD-10-CM

## 2024-05-20 DIAGNOSIS — Z79.899 IMMUNOSUPPRESSION DUE TO DRUG THERAPY: ICD-10-CM

## 2024-05-20 DIAGNOSIS — M85.80 OSTEOPENIA, UNSPECIFIED LOCATION: ICD-10-CM

## 2024-05-20 DIAGNOSIS — G45.9 TIA (TRANSIENT ISCHEMIC ATTACK): ICD-10-CM

## 2024-05-20 DIAGNOSIS — M15.9 OSTEOARTHRITIS OF MULTIPLE JOINTS, UNSPECIFIED OSTEOARTHRITIS TYPE: ICD-10-CM

## 2024-05-20 PROCEDURE — 99214 OFFICE O/P EST MOD 30 MIN: CPT | Performed by: INTERNAL MEDICINE

## 2024-05-20 PROCEDURE — 3079F DIAST BP 80-89 MM HG: CPT | Performed by: INTERNAL MEDICINE

## 2024-05-20 PROCEDURE — 3074F SYST BP LT 130 MM HG: CPT | Performed by: INTERNAL MEDICINE

## 2024-05-20 PROCEDURE — G2211 COMPLEX E/M VISIT ADD ON: HCPCS | Performed by: INTERNAL MEDICINE

## 2024-05-20 RX ORDER — FOLIC ACID 1 MG/1
1 TABLET ORAL DAILY
Qty: 90 TABLET | Refills: 1 | Status: SHIPPED | OUTPATIENT
Start: 2024-05-20

## 2024-05-20 RX ORDER — HYDROXYCHLOROQUINE SULFATE 200 MG/1
200 TABLET, FILM COATED ORAL 2 TIMES DAILY
Qty: 180 TABLET | Refills: 1 | Status: SHIPPED | OUTPATIENT
Start: 2024-05-20

## 2024-05-20 RX ORDER — METHOTREXATE 2.5 MG/1
15 TABLET ORAL WEEKLY
Qty: 72 TABLET | Refills: 0 | Status: SHIPPED | OUTPATIENT
Start: 2024-05-20

## 2024-05-20 NOTE — ASSESSMENT & PLAN NOTE
DEXA ACL performed 11/15/2021 showing osteopenia.  Continue calcium vitamin D and weightbearing exercise  Monitor bone density scan every 2-3 years  Will avoid bisphosphonate for the time being given her chronic kidney disease

## 2024-05-20 NOTE — ASSESSMENT & PLAN NOTE
No signs of toxicity.  Medications well tolerated and effective   Continue labs every 2-3 months for monitoring.  Labs reviewed and stable

## 2024-05-20 NOTE — ASSESSMENT & PLAN NOTE
dx 2010; CCP+>250; +RF; Nodules present  *mtx 2010 start  *plaquenil 4.9.16  Avoids NSAIDs with CKD    In remission.  Swollen joint count 0. Tender joint count 0.  Good prognosis.  Continue Plaquenil and mtx.   Plaquenil eye exams yearly for toxicity monitoring.  Medications well tolerated without signs of toxicity  Recent labs 5/2024 reviewed and are stable.   Plan will be to continue current medication, frequent intensive lab monitoring every 8-12 weeks (CBC, CMP) for toxicity monitoring and follow up in 4 months

## 2024-05-20 NOTE — ASSESSMENT & PLAN NOTE
History of initial TIA 8/5/22 for which she went to [facility]  Now on Plavix and statin  Continue follow up with [facility]

## 2024-05-20 NOTE — ASSESSMENT & PLAN NOTE
Risks of methotrexate include but are not limited to severe liver damage that can be fatal, the possible need for liver biopsy, bone marrow suppression that can lead to dangerously low blood counts, GI side effects including mouth sores and diarrhea, fatigue, and rare risk of severe pulmonary complications. There should be no alcohol consumed with MTX. MTX can cause severe fetal abnormalities whether the mother or father is taking the medication and thus must be avoided if pregnancy is a possibility.  All medication is to be taken one day a week only. The need for q 8-12 week labs and the need for folic acid supplementation were discussed.

## 2024-05-20 NOTE — PROGRESS NOTES
Office Follow Up      Date: 05/20/2024   Patient Name: Marisol Muro  MRN: [id number]  YOB: 1945    Referring Physician: Mati Samson MD     Chief Complaint:   Chief Complaint   Patient presents with    Arthritis       History of Present Illness: Marisol Muro is a 79 y.o. female who is here today for follow up on  rheumatoid arthritis.  Continues on methotrexate and Plaquenil.  Eye exam stable.  No recent flares.  No swollen joints.  No side effects to medicine.  No serious infections.    She had TIA 8/5/22 for which she went to [facility].  She is now on Plavix and statin  Following with Neurology      Subjective       Review of Systems: Review of Systems   Constitutional:  Negative for chills, fatigue, fever and unexpected weight loss.   HENT:  Negative for mouth sores, sinus pressure and sore throat.         Dry mouth  Nose sores   Eyes:  Negative for pain and redness.        Dry eyes   Respiratory:  Negative for cough and shortness of breath.    Cardiovascular:  Negative for chest pain.   Gastrointestinal:  Negative for abdominal pain, blood in stool, diarrhea, nausea, vomiting and GERD.   Endocrine: Negative for polydipsia and polyuria.   Genitourinary:  Negative for dysuria, genital sores and hematuria.   Musculoskeletal:  Negative for arthralgias, back pain, joint swelling, myalgias, neck pain and neck stiffness.   Skin:  Negative for rash and bruise.        Psoriasis  Photosensitvity  Malar rash   Allergic/Immunologic: Negative for immunocompromised state.   Neurological:  Negative for seizures, weakness, numbness and memory problem.   Hematological:  Negative for adenopathy. Does not bruise/bleed easily.   Psychiatric/Behavioral:  Negative for depressed mood. The patient is not nervous/anxious.         Medications:   Current Outpatient Medications:     acetaminophen (TYLENOL) 650 MG 8 hr tablet, Take 1 tablet by mouth 2 (Two) Times a Day As  Needed for Mild Pain ., Disp: , Rfl:     acyclovir (ZOVIRAX) 400 MG tablet, Take 1 tablet by mouth Every 4 (Four) Hours While Awake. Take no more than 5 doses a day., Disp: 50 tablet, Rfl: 3    aspirin 81 MG EC tablet, Take 1 tablet by mouth Daily., Disp: 30 tablet, Rfl: 0    atorvastatin (LIPITOR) 40 MG tablet, Take 1 tablet by mouth Every Night., Disp: 90 tablet, Rfl: 3    Cholecalciferol 50 MCG (2000 UT) capsule, Take 1 capsule by mouth Daily., Disp: , Rfl:     clopidogrel (PLAVIX) 75 MG tablet, Take 1 tablet by mouth Daily., Disp: , Rfl:     Diclofenac Sodium (VOLTAREN) 1 % gel gel, Apply 2 g topically to the appropriate area as directed Every 12 (Twelve) Hours., Disp: , Rfl:     estradiol (ESTRACE) 0.1 MG/GM vaginal cream, 0.5  grams intravaginal 2x/week (Patient taking differently: 2 g 2 (Two) Times a Week. 0.5  grams intravaginal 2x/week), Disp: 30 g, Rfl: 11    folic acid (FOLVITE) 1 MG tablet, Take 1 tablet by mouth Daily., Disp: 90 tablet, Rfl: 1    hydroCHLOROthiazide (HYDRODIURIL) 12.5 MG tablet, Take 1 tablet by mouth Daily., Disp: 90 tablet, Rfl: 3    hydroxychloroquine (PLAQUENIL) 200 MG tablet, Take 1 tablet by mouth 2 (Two) Times a Day., Disp: 180 tablet, Rfl: 1    methotrexate 2.5 MG tablet, Take 6 tablets by mouth 1 (One) Time Per Week. On Sunday, Disp: 72 tablet, Rfl: 0    omeprazole (priLOSEC) 20 MG capsule, Take 1 capsule by mouth Daily., Disp: 90 capsule, Rfl: 3    cetirizine (zyrTEC) 10 MG tablet, Take 1 tablet by mouth Daily As Needed. (Patient not taking: Reported on 5/20/2024), Disp: , Rfl:     nitrofurantoin, macrocrystal-monohydrate, (Macrobid) 100 MG capsule, Take 1 capsule by mouth 2 (Two) Times a Day for 7 days. (Patient not taking: Reported on 5/20/2024), Disp: 14 capsule, Rfl: 0    tolterodine LA (Detrol LA) 2 MG 24 hr capsule, Take 1 capsule by mouth Daily. (Patient not taking: Reported on 5/20/2024), Disp: 90 capsule, Rfl: 3    Allergies:   Allergies   Allergen Reactions    Latex  "Rash    Tramadol Nausea Only and Other (See Comments)     GI upset    Wound Dressing Adhesive Rash     slight redness at tape site       I have reviewed and updated the patient's chief complaint, history of present illness, review of systems, past medical history, surgical history, family history, social history, medications and allergy list as appropriate.     Objective        Vital Signs:   Vitals:    05/20/24 1401   BP: 110/82   BP Location: Right arm   Patient Position: Sitting   Cuff Size: Adult   Pulse: 68   Temp: 97.8 °F (36.6 °C)   TempSrc: Temporal   Weight: 93.7 kg (206 lb 8 oz)   Height: 175.3 cm (69\")   PainSc: 0-No pain     Body mass index is 30.49 kg/m².      Physical Exam:  Physical Exam   MUSCULOSKELETAL:   No peripheral synovitis  Well-healed scar bilateral knee from joint replacement    Complete joint exam was performed including the MCPs, PIPs, DIPs of the hands, wrists, elbows, shoulders, hips, knees and ankles.  No soft tissue swelling or tenderness is present except as above.    General: The patient is well-developed and well nourished. Cooperative, alert and oriented. Affect is normal. Hydration appears normal.   HEENT: Normocephalic and atraumatic. No notable alopecia. Lids and conjunctiva are normal. Pupils are equal and sclera are clear. Oropharynx is clear   NECK neck is supple without adenopathy, masses or thyromegaly.   CARDIOVASCULAR: Regular rate and rhythm. No murmurs, rubs or gallops   LUNGS: Effort is normal. Lungs are clear bilateral   ABDOMEN: Not examined  EXTREMITIES: Peripheral pulses are intact. No clubbing.   SKIN: No rashes. No subcutaneous nodules. No digital ulcers. No sclerodactyly.   NEUROLOGIC: Gait is normal. Strength testing is normal.  No focal neurologic deficits    Results Review:   Labs:   Lab Results   Component Value Date    GLUCOSE 113 (H) 06/12/2023    BUN 11 06/12/2023    CREATININE 0.72 06/12/2023    EGFR 84.6 06/12/2023    BCR 15.3 06/12/2023    K 3.7 " "06/12/2023    CO2 23.9 06/12/2023    CALCIUM 9.5 06/12/2023    ALBUMIN 4.0 06/12/2023    BILITOT 0.6 06/12/2023    AST 20 06/12/2023    ALT 13 06/12/2023     Lab Results   Component Value Date    WBC 6.05 06/12/2023    HGB 12.3 06/12/2023    HCT 38.0 06/12/2023    MCV 92.7 06/12/2023     06/12/2023     No results found for: \"SEDRATE\"  No results found for: \"CRP\"  No results found for: \"QUANTIFERO\", \"QUANTITB1\", \"QUANTITB2\", \"QUANTIFERN\", \"QUANTIFERM\", \"QUANTITBGLDP\"  No results found for: \"RF\"  No results found for: \"HEPBSAG\", \"HEPAIGM\", \"HEPBIGMCORE\", \"HEPCVIRUSABY\"      Procedures    Assessment / Plan        Assessment & Plan  Rheumatoid arthritis with rheumatoid factor of multiple sites without organ or systems involvement  dx 2010; CCP+>250; +RF; Nodules present  *mtx 2010 start  *plaquenil 4.9.16  Avoids NSAIDs with CKD    In remission.  Swollen joint count 0. Tender joint count 0.  Good prognosis.  Continue Plaquenil and mtx.   Plaquenil eye exams yearly for toxicity monitoring.  Medications well tolerated without signs of toxicity  Recent labs 5/2024 reviewed and are stable.   Plan will be to continue current medication, frequent intensive lab monitoring every 8-12 weeks (CBC, CMP) for toxicity monitoring and follow up in 4 months  High risk medication use  Risks of methotrexate include but are not limited to severe liver damage that can be fatal, the possible need for liver biopsy, bone marrow suppression that can lead to dangerously low blood counts, GI side effects including mouth sores and diarrhea, fatigue, and rare risk of severe pulmonary complications. There should be no alcohol consumed with MTX. MTX can cause severe fetal abnormalities whether the mother or father is taking the medication and thus must be avoided if pregnancy is a possibility.  All medication is to be taken one day a week only. The need for q 8-12 week labs and the need for folic acid supplementation were " discussed.  Immunosuppression due to drug therapy  No signs of toxicity.  Medications well tolerated and effective   Continue labs every 2-3 months for monitoring.  Labs reviewed and stable  Osteopenia, unspecified location  DEXA ACL performed 11/15/2021 showing osteopenia.  Continue calcium vitamin D and weightbearing exercise  Monitor bone density scan every 2-3 years  Will avoid bisphosphonate for the time being given her chronic kidney disease  Osteoarthritis of multiple joints, unspecified osteoarthritis type  Avoids NSAIDs with CKD, history of stroke  Presence of artificial knee joint, bilateral    History of right hip replacement    TIA (transient ischemic attack)  History of initial TIA 8/5/22 for which she went to [facility]  Now on Plavix and statin  Continue follow up with [facility]    Orders Placed This Encounter   Procedures    BUN+Creat ([facility])    CBC Auto Differential    C-reactive Protein    Hepatic Function Panel    Sedimentation Rate     New Medications Ordered This Visit   Medications    hydroxychloroquine (PLAQUENIL) 200 MG tablet     Sig: Take 1 tablet by mouth 2 (Two) Times a Day.     Dispense:  180 tablet     Refill:  1    methotrexate 2.5 MG tablet     Sig: Take 6 tablets by mouth 1 (One) Time Per Week. On Sunday     Dispense:  72 tablet     Refill:  0    folic acid (FOLVITE) 1 MG tablet     Sig: Take 1 tablet by mouth Daily.     Dispense:  90 tablet     Refill:  1           Follow Up:   Return in about 4 months (around 9/20/2024).        Walt Diamond MD  [facility]

## 2024-06-03 RX ORDER — ACYCLOVIR 400 MG/1
400 TABLET ORAL
Qty: 90 TABLET | Refills: 3 | Status: SHIPPED | OUTPATIENT
Start: 2024-06-03

## 2024-08-14 ENCOUNTER — OFFICE VISIT (OUTPATIENT)
Dept: OBSTETRICS AND GYNECOLOGY | Facility: CLINIC | Age: 79
End: 2024-08-14
Payer: MEDICARE

## 2024-08-14 VITALS
SYSTOLIC BLOOD PRESSURE: 138 MMHG | DIASTOLIC BLOOD PRESSURE: 72 MMHG | HEIGHT: 69 IN | BODY MASS INDEX: 29.62 KG/M2 | WEIGHT: 200 LBS

## 2024-08-14 DIAGNOSIS — N95.2 POSTMENOPAUSAL ATROPHIC VAGINITIS: ICD-10-CM

## 2024-08-14 DIAGNOSIS — N81.11 CYSTOCELE, MIDLINE: ICD-10-CM

## 2024-08-14 DIAGNOSIS — R32 URINARY INCONTINENCE, UNSPECIFIED TYPE: ICD-10-CM

## 2024-08-14 DIAGNOSIS — N81.6 RECTOCELE: ICD-10-CM

## 2024-08-14 DIAGNOSIS — Z46.89 ENCOUNTER FOR PESSARY MAINTENANCE: Primary | ICD-10-CM

## 2024-08-14 DIAGNOSIS — N81.4 UTERINE PROLAPSE: ICD-10-CM

## 2024-08-14 NOTE — PROGRESS NOTES
Chief Complaint  Pessary Check (3 month pessary follow up, no complaints. )     History of Present Illness:  Patient is 79 y.o.  who presents to [facility] here for follow-up evaluation of her prolapse and pessary.  Patient denies any vaginal bleeding or spotting.  She denies any discharge with odor.  She reports no changes in her urinary symptoms or bowel movements.  She has been using her estrogen cream.  She does report the recent loss of her daughter in July at the age of 46.  Patient sees Dr. Samson for her primary care.  Patient did have previous CT and MRI as noted.    History  Past Medical History:   Diagnosis Date    Asymptomatic bacteriuria     Cataract     CKD (chronic kidney disease), stage III     COVID-19 vaccine series completed     Moderna    Difficulty walking     Ear piercing     Elevated cholesterol     Esophageal stricture     Fracture of metatarsal bone of left foot     GERD (gastroesophageal reflux disease)     Headache, tension-type     Hearing aid worn     High risk medication use     History of prolapse of bladder     HL (hearing loss)     no hearing aids    Hyperlipidemia     Hypertension     Impaired fasting glucose     Obesity     Osteoarthritis     Osteoarthritis of hand     Osteopenia     Rheumatoid arthritis     Rheumatoid arthritis with rheumatoid factor of multiple sites without organ or systems involvement     Rheumatoid nodule     Shingles     Skin cancer     left ankle    Stroke     TIA    TIA (transient ischemic attack) 2022    Trigger finger, right     Vision loss     Wears glasses      Current Outpatient Medications on File Prior to Visit   Medication Sig Dispense Refill    acetaminophen (TYLENOL) 650 MG 8 hr tablet Take 1 tablet by mouth 2 (Two) Times a Day As Needed for Mild Pain .      acyclovir (ZOVIRAX) 400 MG tablet Take 1 tablet by mouth Every 4 (Four) Hours While Awake. Take no more than 5 doses a day. 90 tablet 3    aspirin 81 MG EC  tablet Take 1 tablet by mouth Daily. 30 tablet 0    atorvastatin (LIPITOR) 40 MG tablet Take 1 tablet by mouth Every Night. 90 tablet 3    cetirizine (zyrTEC) 10 MG tablet Take 1 tablet by mouth Daily As Needed. (Patient not taking: Reported on 5/20/2024)      Cholecalciferol 50 MCG (2000 UT) capsule Take 1 capsule by mouth Daily.      clopidogrel (PLAVIX) 75 MG tablet Take 1 tablet by mouth Daily.      Diclofenac Sodium (VOLTAREN) 1 % gel gel Apply 2 g topically to the appropriate area as directed Every 12 (Twelve) Hours.      estradiol (ESTRACE) 0.1 MG/GM vaginal cream 0.5  grams intravaginal 2x/week (Patient taking differently: 2 g 2 (Two) Times a Week. 0.5  grams intravaginal 2x/week) 30 g 11    folic acid (FOLVITE) 1 MG tablet Take 1 tablet by mouth Daily. 90 tablet 1    hydroCHLOROthiazide (HYDRODIURIL) 12.5 MG tablet Take 1 tablet by mouth Daily. 90 tablet 3    hydroxychloroquine (PLAQUENIL) 200 MG tablet Take 1 tablet by mouth 2 (Two) Times a Day. 180 tablet 1    methotrexate 2.5 MG tablet Take 6 tablets by mouth 1 (One) Time Per Week. On Sunday 72 tablet 0    omeprazole (priLOSEC) 20 MG capsule Take 1 capsule by mouth Daily. 90 capsule 3    tolterodine LA (Detrol LA) 2 MG 24 hr capsule Take 1 capsule by mouth Daily. (Patient not taking: Reported on 5/20/2024) 90 capsule 3     No current facility-administered medications on file prior to visit.     Allergies   Allergen Reactions    Latex Rash    Tramadol Nausea Only and Other (See Comments)     GI upset    Wound Dressing Adhesive Rash     slight redness at tape site     Past Surgical History:   Procedure Laterality Date    CATARACT EXTRACTION W/ INTRAOCULAR LENS IMPLANT Right 08/28/2023    Procedure: CATARACT PHACO EXTRACTION WITH INTRAOCULAR LENS IMPLANT RIGHT;  Surgeon: Astrid Shannon MD;  Location: [facility];  Service: Ophthalmology;  Laterality: Right;    CATARACT EXTRACTION W/ INTRAOCULAR LENS IMPLANT Left 09/25/2023    Procedure: CATARACT PHACO  "EXTRACTION WITH INTRAOCULAR LENS IMPLANT LEFT;  Surgeon: Astrid Shannon MD;  Location: [facility];  Service: Ophthalmology;  Laterality: Left;    COLONOSCOPY      ENDOSCOPY      ESOPHAGEAL DILATATION      GROIN HIDRADENITIS EXCISION Right 10/29/2021    Procedure: GROIN HIDRADENITIS EXCISION with excision of right leg mass;  Surgeon: Tanmay Clements MD;  Location: [facility];  Service: General;  Laterality: Right;    JOINT REPLACEMENT      LAPAROSCOPIC TUBAL LIGATION      REPLACEMENT TOTAL KNEE BILATERAL      ,     SKIN BIOPSY      SKIN CANCER EXCISION      TOTAL HIP ARTHROPLASTY  2014    right     Family History   Problem Relation Age of Onset    Stroke Mother     Arthritis Mother     Heart disease Mother     Dementia Mother          at 85 yrs. old    Osteoporosis Mother     Lung cancer Father     Cancer Father     Hyperlipidemia Sister     Hypertension Sister      Social History     Socioeconomic History    Marital status:      Spouse name: [facility]    Number of children: 2   Tobacco Use    Smoking status: Former     Current packs/day: 0.00     Average packs/day: 1 pack/day for 21.6 years (21.6 ttl pk-yrs)     Types: Cigarettes     Start date: 1961     Quit date: 1983     Years since quittin.6     Passive exposure: Past    Smokeless tobacco: Never   Vaping Use    Vaping status: Never Used   Substance and Sexual Activity    Alcohol use: Not Currently     Comment: rare     Drug use: No    Sexual activity: Not Currently       Physical Examination:  Vital Signs: /72   Ht 175.3 cm (69\")   Wt 90.7 kg (200 lb)   BMI 29.53 kg/m²     General Appearance: alert, appears stated age, and cooperative  Breasts: Not performed.  Abdomen: no masses, no hepatomegaly, no splenomegaly, soft non-tender, no guarding, and no rebound tenderness  Pelvic: Clinical staff was present for exam; pelvic examination was required for evaluation  External genitalia:  normal appearance of the external genitalia " including Bartholin's and [place]'s glands.  :  urethral meatus normal;  Vaginal:  atrophic mucosal changes are present;  Cervix:  normal appearance.  Uterus:  normal size, shape and consistency.  Adnexa:  non palpable bilaterally.  Cystocele GRADE 2  Rectocele GRADE 2  Uterine GRADE 2  Pessary was removed, cleaned, and reinserted without difficulty    Data Review:  The following data was reviewed by: Rafia Owens MD on 08/14/2024:     Labs:    Imaging:  MRI Brain Without Contrast (05/17/2024 14:32)  CT Angiogram Head (05/17/2024 14:32)  CT Angiogram Neck (05/17/2024 14:33)  DEXA Scan (05/17/2024 14:33)  Medical Records:  None    Assessment and Plan   1. Cystocele, midline  There are no changes noted on examination.  Patient desires to continue with her currently prescribed pessary.  Patient is to follow-up in 3 months.    2. Rectocele  I discussed with the patient the need to keep stools soft and no straining with defecation.    3. Encounter for pessary maintenance  The patient's pessary was removed, cleaned, and reinserted.  Instructions and precautions were given.  The patient is to follow up as discussed.     4. Uterine prolapse  There are no changes noted on examination.  Patient desires to continue with her pessary as noted.  Patient follow-up as discussed.    5. Urinary incontinence, unspecified type  Patient is to continue the use of her estrogen cream as previously given.  Instructions and precautions have been given.  She desires to continue with her incontinence dish pessary as well.    6. Postmenopausal atrophic vaginitis  Patient to continue with her estrogen cream as discussed.    Follow Up/Instructions:  Follow up as noted.  Patient was given instructions and counseling regarding her condition or for health maintenance advice. Please see specific information pulled into the AVS if appropriate.     Note: Speech recognition transcription software may have been used to dictate portions of this document.   An attempt at proofreading has been made though minor errors in transcription may still be present.    This note was electronically signed.  Rafia Owens M.D.

## 2024-08-19 RX ORDER — ATORVASTATIN CALCIUM 40 MG/1
40 TABLET, FILM COATED ORAL NIGHTLY
Qty: 90 TABLET | Refills: 3 | Status: SHIPPED | OUTPATIENT
Start: 2024-08-19

## 2024-09-16 ENCOUNTER — OFFICE VISIT (OUTPATIENT)
Age: 79
End: 2024-09-16
Payer: MEDICARE

## 2024-09-16 ENCOUNTER — TELEPHONE (OUTPATIENT)
Age: 79
End: 2024-09-16

## 2024-09-16 VITALS
DIASTOLIC BLOOD PRESSURE: 84 MMHG | BODY MASS INDEX: 30.29 KG/M2 | WEIGHT: 204.5 LBS | SYSTOLIC BLOOD PRESSURE: 124 MMHG | TEMPERATURE: 97.2 F | HEART RATE: 81 BPM | HEIGHT: 69 IN

## 2024-09-16 DIAGNOSIS — M05.79 RHEUMATOID ARTHRITIS WITH RHEUMATOID FACTOR OF MULTIPLE SITES WITHOUT ORGAN OR SYSTEMS INVOLVEMENT: ICD-10-CM

## 2024-09-16 DIAGNOSIS — Z79.899 HIGH RISK MEDICATION USE: ICD-10-CM

## 2024-09-16 PROCEDURE — 1159F MED LIST DOCD IN RCRD: CPT | Performed by: INTERNAL MEDICINE

## 2024-09-16 PROCEDURE — 3079F DIAST BP 80-89 MM HG: CPT | Performed by: INTERNAL MEDICINE

## 2024-09-16 PROCEDURE — 1160F RVW MEDS BY RX/DR IN RCRD: CPT | Performed by: INTERNAL MEDICINE

## 2024-09-16 PROCEDURE — 99214 OFFICE O/P EST MOD 30 MIN: CPT | Performed by: INTERNAL MEDICINE

## 2024-09-16 PROCEDURE — 3074F SYST BP LT 130 MM HG: CPT | Performed by: INTERNAL MEDICINE

## 2024-09-16 PROCEDURE — G2211 COMPLEX E/M VISIT ADD ON: HCPCS | Performed by: INTERNAL MEDICINE

## 2024-09-16 RX ORDER — HYDROXYCHLOROQUINE SULFATE 200 MG/1
200 TABLET, FILM COATED ORAL 2 TIMES DAILY
Qty: 180 TABLET | Refills: 1 | Status: SHIPPED | OUTPATIENT
Start: 2024-09-16

## 2024-09-16 RX ORDER — FOLIC ACID 1 MG/1
1 TABLET ORAL DAILY
Qty: 90 TABLET | Refills: 1 | Status: SHIPPED | OUTPATIENT
Start: 2024-09-16

## 2024-09-16 RX ORDER — METHOTREXATE 2.5 MG/1
15 TABLET ORAL WEEKLY
Qty: 72 TABLET | Refills: 0 | Status: SHIPPED | OUTPATIENT
Start: 2024-09-16

## 2024-11-04 ENCOUNTER — OFFICE VISIT (OUTPATIENT)
Dept: INTERNAL MEDICINE | Facility: CLINIC | Age: 79
End: 2024-11-04
Payer: MEDICARE

## 2024-11-04 VITALS
OXYGEN SATURATION: 97 % | WEIGHT: 199 LBS | SYSTOLIC BLOOD PRESSURE: 150 MMHG | DIASTOLIC BLOOD PRESSURE: 82 MMHG | HEIGHT: 69 IN | BODY MASS INDEX: 29.47 KG/M2 | TEMPERATURE: 97.8 F | RESPIRATION RATE: 16 BRPM | HEART RATE: 78 BPM

## 2024-11-04 DIAGNOSIS — M06.9 RHEUMATOID ARTHRITIS, INVOLVING UNSPECIFIED SITE, UNSPECIFIED WHETHER RHEUMATOID FACTOR PRESENT: ICD-10-CM

## 2024-11-04 DIAGNOSIS — I10 ESSENTIAL HYPERTENSION: ICD-10-CM

## 2024-11-04 DIAGNOSIS — R26.9 GAIT DISTURBANCE: Primary | ICD-10-CM

## 2024-11-04 PROCEDURE — 1159F MED LIST DOCD IN RCRD: CPT | Performed by: INTERNAL MEDICINE

## 2024-11-04 PROCEDURE — 1160F RVW MEDS BY RX/DR IN RCRD: CPT | Performed by: INTERNAL MEDICINE

## 2024-11-04 PROCEDURE — 3077F SYST BP >= 140 MM HG: CPT | Performed by: INTERNAL MEDICINE

## 2024-11-04 PROCEDURE — 1170F FXNL STATUS ASSESSED: CPT | Performed by: INTERNAL MEDICINE

## 2024-11-04 PROCEDURE — 1125F AMNT PAIN NOTED PAIN PRSNT: CPT | Performed by: INTERNAL MEDICINE

## 2024-11-04 PROCEDURE — 3079F DIAST BP 80-89 MM HG: CPT | Performed by: INTERNAL MEDICINE

## 2024-11-04 PROCEDURE — G0439 PPPS, SUBSEQ VISIT: HCPCS | Performed by: INTERNAL MEDICINE

## 2024-11-04 NOTE — PROGRESS NOTES
Subjective   The ABCs of the Annual Wellness Visit  Medicare Wellness Visit      Marisol Muro is a 79 y.o. patient who presents for a Medicare Wellness Visit.    The following portions of the patient's history were reviewed and   updated as appropriate: allergies, current medications, past family history, past medical history, past social history, past surgical history, and problem list.    Review of Systems   Constitutional: Negative.  Negative for activity change, appetite change, fatigue and fever.   HENT:  Negative for congestion, ear discharge, ear pain and trouble swallowing.    Eyes:  Negative for photophobia and visual disturbance.   Respiratory:  Negative for cough and shortness of breath.    Cardiovascular:  Negative for chest pain and palpitations.   Gastrointestinal:  Negative for abdominal distention, abdominal pain, constipation, diarrhea, nausea and vomiting.   Endocrine: Negative.    Genitourinary:  Negative for dysuria, hematuria and urgency.   Musculoskeletal:  Positive for arthralgias and back pain. Negative for joint swelling and myalgias.   Skin:  Negative for color change and rash.   Allergic/Immunologic: Negative.    Neurological:  Positive for headaches. Negative for dizziness, weakness, light-headedness.  Hematological:  Negative for adenopathy. Does not bruise/bleed easily.   Psychiatric/Behavioral:  Positive for sleep disturbance. Negative for agitation, confusion and dysphoric mood. The patient is not nervous/anxious.      Compared to one year ago, the patient's physical   health is the same.  Compared to one year ago, the patient's mental   health is the same.    Recent Hospitalizations:  She was not admitted to the hospital during the last year.     Current Medical Providers:  Patient Care Team:  Mati Samson MD as PCP - General (Internal Medicine)  Walt Diamond MD as Consulting Physician (Rheumatology)    Outpatient Medications Prior to Visit   Medication Sig Dispense  Refill    acetaminophen (TYLENOL) 650 MG 8 hr tablet Take 1 tablet by mouth 2 (Two) Times a Day As Needed for Mild Pain .      acyclovir (ZOVIRAX) 400 MG tablet Take 1 tablet by mouth Every 4 (Four) Hours While Awake. Take no more than 5 doses a day. 90 tablet 3    aspirin 81 MG EC tablet Take 1 tablet by mouth Daily. 30 tablet 0    atorvastatin (LIPITOR) 40 MG tablet Take 1 tablet by mouth Every Night. 90 tablet 3    clopidogrel (PLAVIX) 75 MG tablet Take 1 tablet by mouth Daily.      Diclofenac Sodium (VOLTAREN) 1 % gel gel Apply 2 g topically to the appropriate area as directed Every 12 (Twelve) Hours.      estradiol (ESTRACE) 0.1 MG/GM vaginal cream 0.5  grams intravaginal 2x/week (Patient taking differently: 2 g 2 (Two) Times a Week. 0.5  grams intravaginal 2x/week) 30 g 11    folic acid (FOLVITE) 1 MG tablet Take 1 tablet by mouth Daily. 90 tablet 1    hydroCHLOROthiazide (HYDRODIURIL) 12.5 MG tablet Take 1 tablet by mouth Daily. 90 tablet 3    hydroxychloroquine (PLAQUENIL) 200 MG tablet Take 1 tablet by mouth 2 (Two) Times a Day. 180 tablet 1    methotrexate 2.5 MG tablet Take 6 tablets by mouth 1 (One) Time Per Week. On Sunday 72 tablet 0    omeprazole (priLOSEC) 20 MG capsule Take 1 capsule by mouth Daily. 90 capsule 3     No facility-administered medications prior to visit.     No opioid medication identified on active medication list. I have reviewed chart for other potential  high risk medication/s and harmful drug interactions in the elderly.      Aspirin is on active medication list. Aspirin use is indicated based on review of current medical condition/s. Pros and cons of this therapy have been discussed today. Benefits of this medication outweigh potential harm.  Patient has been encouraged to continue taking this medication.  .      Patient Active Problem List   Diagnosis    Rheumatoid arthritis    Osteoarthritis    Essential hypertension    GERD (gastroesophageal reflux disease)    Esophageal  stricture    Obesity (BMI 30-39.9)    Iron deficiency anemia    Hidradenitis    TIA (transient ischemic attack)    Rheumatoid arthritis with rheumatoid factor of multiple sites without organ or systems involvement    Presence of artificial knee joint, bilateral    Osteopenia    High risk medication use    History of right hip replacement    Immunosuppression due to drug therapy     Advance Care Planning Advance Directive is on file.  ACP discussion was held with the patient during this visit. Patient has an advance directive in EMR which is still valid.       Physical Exam  Constitutional:       General: She is not in acute distress.     Appearance: She is well-developed.   HENT:      Nose: Nose normal.   Eyes:      General: No scleral icterus.     Conjunctiva/sclera: Conjunctivae normal.   Neck:      Thyroid: No thyromegaly.      Trachea: No tracheal deviation.   Cardiovascular:      Rate and Rhythm: Normal rate and regular rhythm.      Heart sounds: No murmur heard.     No friction rub.   Pulmonary:      Effort: No respiratory distress.      Breath sounds: No wheezing or rales.   Abdominal:      General: There is no distension.      Palpations: Abdomen is soft. There is no mass.      Tenderness: There is no abdominal tenderness. There is no guarding.   Musculoskeletal:         General: No deformity. Normal range of motion.   Lymphadenopathy:      Cervical: No cervical adenopathy.   Skin:     General: Skin is warm and dry.      Findings: No erythema or rash.   Neurological:      Mental Status: She is alert and oriented to person, place, and time.      Cranial Nerves: No cranial nerve deficit.      Coordination: Coordination normal.      Deep Tendon Reflexes: Reflexes are normal and symmetric.   Psychiatric:         Behavior: Behavior normal.         Thought Content: Thought content normal.         Judgment: Judgment normal.       Objective   Vitals:    11/04/24 1313   BP: 150/82   Pulse: 78   Resp: 16   Temp: 97.8  "°F (36.6 °C)   TempSrc: Infrared   SpO2: 97%   Weight: 90.3 kg (199 lb)   Height: 175.3 cm (69.02\")   PainSc:   3   PainLoc: Foot       Estimated body mass index is 29.37 kg/m² as calculated from the following:    Height as of this encounter: 175.3 cm (69.02\").    Weight as of this encounter: 90.3 kg (199 lb).    BMI is >= 30 and <35. (Class 1 Obesity). The following options were offered after discussion;: exercise counseling/recommendations and nutrition counseling/recommendations       Does the patient have evidence of cognitive impairment? No                                                                                               Health  Risk Assessment    Smoking Status:  Social History     Tobacco Use   Smoking Status Former    Current packs/day: 0.00    Average packs/day: 1 pack/day for 21.6 years (21.6 ttl pk-yrs)    Types: Cigarettes    Start date: 1961    Quit date: 1983    Years since quittin.8    Passive exposure: Past   Smokeless Tobacco Never     Alcohol Consumption:  Social History     Substance and Sexual Activity   Alcohol Use Not Currently    Comment: rare        Fall Risk Screen  STEADI Fall Risk Assessment was completed, and patient is at MODERATE risk for falls. Assessment completed on:2024    Depression Screenin/4/2024     1:12 PM   PHQ-2/PHQ-9 Depression Screening   Little interest or pleasure in doing things Not at all   Feeling down, depressed, or hopeless Several days     Health Habits and Functional and Cognitive Screenin/4/2024     1:12 PM   Functional & Cognitive Status   Do you have difficulty preparing food and eating? No   Do you have difficulty bathing yourself, getting dressed or grooming yourself? No   Do you have difficulty using the toilet? No   Do you have difficulty moving around from place to place? No   Do you have trouble with steps or getting out of a bed or a chair? No   Current Diet Limited Junk Food   Exercise (times per week) 0 " times per week   Current Exercises Include No Regular Exercise   Do you need help using the phone?  No   Are you deaf or do you have serious difficulty hearing?  No   Do you need help to go to places out of walking distance? Yes   Do you need help shopping? No   Do you need help preparing meals?  No   Do you need help with housework?  No   Do you need help with laundry? No   Do you need help taking your medications? No   Do you need help managing money? No   Do you ever drive or ride in a car without wearing a seat belt? No   Have you felt unusual stress, anger or loneliness in the last month? No   Who do you live with? Spouse   If you need help, do you have trouble finding someone available to you? No   Have you been bothered in the last four weeks by sexual problems? No   Do you have difficulty concentrating, remembering or making decisions? No           Age-appropriate Screening Schedule:  Refer to the list below for future screening recommendations based on patient's age, sex and/or medical conditions. Orders for these recommended tests are listed in the plan section. The patient has been provided with a written plan.    Health Maintenance List  Health Maintenance   Topic Date Due    MAMMOGRAM  Never done    TDAP/TD VACCINES (1 - Tdap) Never done    COLORECTAL CANCER SCREENING  08/11/2018    LIPID PANEL  08/06/2023    INFLUENZA VACCINE  08/01/2024    ANNUAL WELLNESS VISIT  10/30/2024    BMI FOLLOWUP  10/30/2024    RSV Vaccine - Adults (1 - 1-dose 75+ series) 05/02/2025 (Originally 1/25/2018)    COVID-19 Vaccine (4 - 2024-25 season) 11/04/2025 (Originally 9/1/2024)    ZOSTER VACCINE (1 of 2) 11/04/2025 (Originally 10/27/2014)    DXA SCAN  05/17/2026    Pneumococcal Vaccine 65+  Completed                                                                                                                                                CMS Preventative Services Quick Reference  Risk Factors Identified During  Encounter  Polypharmacy: Medication List reviewed and Medications are appropriate for patient    The above risks/problems have been discussed with the patient.  Pertinent information has been shared with the patient in the After Visit Summary.  An After Visit Summary and PPPS were made available to the patient.    Follow Up:   Next Medicare Wellness visit to be scheduled in 1 year.     Assessment & Plan  Gait disturbance    Orders:    Ambulatory Referral to Physical Therapy for Evaluation & Treatment  -Discussed exercises patient can do at home to improve balance.   -Referral for physical therapy was given today.     Essential hypertension    -Stable, continue current medication Hydrochlorothiazide and low-salt diet.  Hopefully we can get her off HCTZ if she continues to lose weight       Rheumatoid arthritis, involving unspecified site, unspecified whether rheumatoid factor present  -Stable, continue to follow up with rheumatology and taking current meds plaquenil and methotrexate.   -Patient is being followed by Rheumatology , receiving yearly eye exams for toxicity monitoring, and lab monitoring (CBC,CMP) for toxicity monitoring. Labs were okay in September 2024.   -Medications well tolerated without signs of toxicity.              Follow Up:   No follow-ups on file.   This note accurately reflects work and decisions made by me.

## 2024-11-04 NOTE — ASSESSMENT & PLAN NOTE
-Stable, continue current medication Hydrochlorothiazide and low-salt diet.  Hopefully we can get her off HCTZ if she continues to lose weight

## 2024-11-04 NOTE — ASSESSMENT & PLAN NOTE
Orders:    Ambulatory Referral to Physical Therapy for Evaluation & Treatment  -Discussed exercises patient can do at home to improve balance.   -Referral for physical therapy was given today.

## 2024-11-04 NOTE — ASSESSMENT & PLAN NOTE
-Stable, continue to follow up with rheumatology and taking current meds plaquenil and methotrexate.   -Patient is being followed by Rheumatology , receiving yearly eye exams for toxicity monitoring, and lab monitoring (CBC,CMP) for toxicity monitoring. Labs were okay in September 2024.   -Medications well tolerated without signs of toxicity.

## 2024-11-15 ENCOUNTER — OFFICE VISIT (OUTPATIENT)
Dept: OBSTETRICS AND GYNECOLOGY | Facility: CLINIC | Age: 79
End: 2024-11-15
Payer: MEDICARE

## 2024-11-15 VITALS
BODY MASS INDEX: 29.33 KG/M2 | SYSTOLIC BLOOD PRESSURE: 112 MMHG | HEIGHT: 69 IN | DIASTOLIC BLOOD PRESSURE: 76 MMHG | WEIGHT: 198 LBS

## 2024-11-15 DIAGNOSIS — N95.2 POSTMENOPAUSAL ATROPHIC VAGINITIS: ICD-10-CM

## 2024-11-15 DIAGNOSIS — N81.4 UTERINE PROLAPSE: ICD-10-CM

## 2024-11-15 DIAGNOSIS — R32 URINARY INCONTINENCE, UNSPECIFIED TYPE: ICD-10-CM

## 2024-11-15 DIAGNOSIS — Z46.89 ENCOUNTER FOR PESSARY MAINTENANCE: Primary | ICD-10-CM

## 2024-11-15 DIAGNOSIS — N81.11 CYSTOCELE, MIDLINE: ICD-10-CM

## 2024-11-15 DIAGNOSIS — N81.6 RECTOCELE: ICD-10-CM

## 2024-11-15 DIAGNOSIS — R39.198 DIFFICULTY VOIDING: ICD-10-CM

## 2024-11-15 PROCEDURE — 3074F SYST BP LT 130 MM HG: CPT | Performed by: OBSTETRICS & GYNECOLOGY

## 2024-11-15 PROCEDURE — 1159F MED LIST DOCD IN RCRD: CPT | Performed by: OBSTETRICS & GYNECOLOGY

## 2024-11-15 PROCEDURE — 1160F RVW MEDS BY RX/DR IN RCRD: CPT | Performed by: OBSTETRICS & GYNECOLOGY

## 2024-11-15 PROCEDURE — 3078F DIAST BP <80 MM HG: CPT | Performed by: OBSTETRICS & GYNECOLOGY

## 2024-11-15 PROCEDURE — 99214 OFFICE O/P EST MOD 30 MIN: CPT | Performed by: OBSTETRICS & GYNECOLOGY

## 2024-11-16 DIAGNOSIS — M05.79 RHEUMATOID ARTHRITIS WITH RHEUMATOID FACTOR OF MULTIPLE SITES WITHOUT ORGAN OR SYSTEMS INVOLVEMENT: ICD-10-CM

## 2024-11-16 DIAGNOSIS — Z79.899 HIGH RISK MEDICATION USE: ICD-10-CM

## 2024-11-18 RX ORDER — HYDROXYCHLOROQUINE SULFATE 200 MG/1
200 TABLET, FILM COATED ORAL 2 TIMES DAILY
Qty: 180 TABLET | Refills: 1 | Status: SHIPPED | OUTPATIENT
Start: 2024-11-18

## 2024-11-18 NOTE — PROGRESS NOTES
Chief Complaint  Pessary Check (3 month follow up pessary.)     History of Present Illness:  Patient is 79 y.o.  who presents to [facility] here for follow-up evaluation of her prolapse and pessary.  Patient reports the pessary has stayed in place.  She denies any pressure or bulge per vagina.  She reports good relief with the pessary.  She continues to have occasional difficulty emptying her bladder.  She continues to use her estrogen cream.  She reports no changes with her urinary incontinence.  Her bowel movements are unchanged.  She sees Dr. Samson for primary care.  She recently had her annual appointment.  She has had labs as noted.  She reports no major changes in her health or medications.  She had labs in September with her rheumatologist.    History  Past Medical History:   Diagnosis Date    Asymptomatic bacteriuria     Cataract     CKD (chronic kidney disease), stage III     COVID-19 vaccine series completed     Moderna    Difficulty walking     Ear piercing     Elevated cholesterol     Esophageal stricture     Fracture of metatarsal bone of left foot     GERD (gastroesophageal reflux disease)     Headache, tension-type     Hearing aid worn     High risk medication use     History of prolapse of bladder     HL (hearing loss)     no hearing aids    Hyperlipidemia     Hypertension     Impaired fasting glucose     Obesity     Osteoarthritis     Osteoarthritis of hand     Osteopenia     Rheumatoid arthritis     Rheumatoid arthritis with rheumatoid factor of multiple sites without organ or systems involvement     Rheumatoid nodule     Shingles     Skin cancer     left ankle    Stroke     TIA    TIA (transient ischemic attack) 2022    Trigger finger, right     Vision loss     Wears glasses      Current Outpatient Medications on File Prior to Visit   Medication Sig Dispense Refill    acetaminophen (TYLENOL) 650 MG 8 hr tablet Take 1 tablet by mouth 2 (Two) Times a Day As  Needed for Mild Pain .      acyclovir (ZOVIRAX) 400 MG tablet Take 1 tablet by mouth Every 4 (Four) Hours While Awake. Take no more than 5 doses a day. 90 tablet 3    aspirin 81 MG EC tablet Take 1 tablet by mouth Daily. 30 tablet 0    atorvastatin (LIPITOR) 40 MG tablet Take 1 tablet by mouth Every Night. 90 tablet 3    clopidogrel (PLAVIX) 75 MG tablet Take 1 tablet by mouth Daily.      Diclofenac Sodium (VOLTAREN) 1 % gel gel Apply 2 g topically to the appropriate area as directed Every 12 (Twelve) Hours.      estradiol (ESTRACE) 0.1 MG/GM vaginal cream 0.5  grams intravaginal 2x/week (Patient taking differently: 2 g 2 (Two) Times a Week. 0.5  grams intravaginal 2x/week) 30 g 11    folic acid (FOLVITE) 1 MG tablet Take 1 tablet by mouth Daily. 90 tablet 1    hydroCHLOROthiazide (HYDRODIURIL) 12.5 MG tablet Take 1 tablet by mouth Daily. 90 tablet 3    hydroxychloroquine (PLAQUENIL) 200 MG tablet Take 1 tablet by mouth 2 (Two) Times a Day. 180 tablet 1    methotrexate 2.5 MG tablet Take 6 tablets by mouth 1 (One) Time Per Week. On Sunday 72 tablet 0    omeprazole (priLOSEC) 20 MG capsule Take 1 capsule by mouth Daily. 90 capsule 3     No current facility-administered medications on file prior to visit.     Allergies   Allergen Reactions    Latex Rash    Tramadol Nausea Only and Other (See Comments)     GI upset    Wound Dressing Adhesive Rash     slight redness at tape site     Past Surgical History:   Procedure Laterality Date    CATARACT EXTRACTION W/ INTRAOCULAR LENS IMPLANT Right 08/28/2023    Procedure: CATARACT PHACO EXTRACTION WITH INTRAOCULAR LENS IMPLANT RIGHT;  Surgeon: Astrid Shannon MD;  Location: [facility];  Service: Ophthalmology;  Laterality: Right;    CATARACT EXTRACTION W/ INTRAOCULAR LENS IMPLANT Left 09/25/2023    Procedure: CATARACT PHACO EXTRACTION WITH INTRAOCULAR LENS IMPLANT LEFT;  Surgeon: Astrid Shannon MD;  Location: [facility] OR;  Service: Ophthalmology;  Laterality: Left;    COLONOSCOPY    "   ENDOSCOPY      ESOPHAGEAL DILATATION      GROIN HIDRADENITIS EXCISION Right 10/29/2021    Procedure: GROIN HIDRADENITIS EXCISION with excision of right leg mass;  Surgeon: Tanmay Clements MD;  Location: [facility];  Service: General;  Laterality: Right;    JOINT REPLACEMENT      LAPAROSCOPIC TUBAL LIGATION      REPLACEMENT TOTAL KNEE BILATERAL      ,     SKIN BIOPSY      SKIN CANCER EXCISION      TOTAL HIP ARTHROPLASTY  2014    right     Family History   Problem Relation Age of Onset    Stroke Mother     Arthritis Mother     Heart disease Mother     Dementia Mother          at 85 yrs. old    Osteoporosis Mother     Lung cancer Father     Cancer Father     Hyperlipidemia Sister     Hypertension Sister      Social History     Socioeconomic History    Marital status:      Spouse name: Ashu    Number of children: 2   Tobacco Use    Smoking status: Former     Current packs/day: 0.00     Average packs/day: 1 pack/day for 21.6 years (21.6 ttl pk-yrs)     Types: Cigarettes     Start date: 1961     Quit date: 1983     Years since quittin.9     Passive exposure: Past    Smokeless tobacco: Never   Vaping Use    Vaping status: Never Used   Substance and Sexual Activity    Alcohol use: Not Currently     Comment: rare     Drug use: No    Sexual activity: Not Currently     Partners: Male     Birth control/protection: Tubal ligation       Physical Examination:  Vital Signs: /76   Ht 175.3 cm (69.02\")   Wt 89.8 kg (198 lb)   BMI 29.22 kg/m²     General Appearance: alert, appears stated age, and cooperative  Breasts: Not performed.  Abdomen: no masses, no hepatomegaly, no splenomegaly, soft non-tender, no guarding, and no rebound tenderness  Pelvic: Clinical staff was present for exam; pelvic examination was required for evaluation  External genitalia:  normal appearance of the external genitalia including Bartholin's and Winterset's glands.  :  urethral meatus normal;  Vaginal:  atrophic " mucosal changes are present;  Cervix:  normal appearance.  Uterus:  normal size, shape and consistency.  Adnexa:  non palpable bilaterally.  Cystocele GRADE 2  Rectocele GRADE 2  Uterine GRADE 2  Pessary was removed, cleaned, and reinserted without difficulty    Data Review:  The following data was reviewed by: Rafia Owens MD on 11/15/2024:     Labs:  LABS SCANNED (09/16/2024)   Imaging:    Medical Records:  Progress Notes by Walt Diamond MD (09/16/2024 13:00)  Progress Notes by Mati Samson MD (11/04/2024 13:15)    Assessment and Plan   1. Cystocele, midline  There are no changes noted on examination.  Patient desires to continue with her currently prescribed incontinence dish pessary.  Instructions and precautions have been given.  Patient to follow-up as discussed.    2. Rectocele  There are no changes noted on examination.  Have stressed to the patient the need to keep stools soft and no straining with defecation.    3. Encounter for pessary maintenance  The patient's pessary was removed, cleaned, and reinserted.  Instructions and precautions were given.  The patient is to follow up as discussed.     4. Uterine prolapse  Patient doing well with her incontinence dish pessary.  Instructions and precautions have been given.  Patient desires to continue with pessary for management of her symptoms.    5. Urinary incontinence, unspecified type  Patient reports no changes in her urinary incontinence.  She desires to continue with her incontinence dish pessary.  She is to continue with her estrogen cream as well.    6. Postmenopausal atrophic vaginitis  Patient with continued atrophic changes.  She is to continue the use of her estrogen cream twice weekly.    7. Difficulty voiding  Patient has been instructed in the use of her estrogen cream to apply to the periurethral area as discussed.    Follow Up/Instructions:  Follow up as noted.  Patient was given instructions and counseling regarding her condition  or for health maintenance advice. Please see specific information pulled into the AVS if appropriate.     Note: Speech recognition transcription software may have been used to dictate portions of this document.  An attempt at proofreading has been made though minor errors in transcription may still be present.    This note was electronically signed.  Rafia Owens M.D.

## 2024-12-01 DIAGNOSIS — M05.79 RHEUMATOID ARTHRITIS WITH RHEUMATOID FACTOR OF MULTIPLE SITES WITHOUT ORGAN OR SYSTEMS INVOLVEMENT: ICD-10-CM

## 2024-12-01 DIAGNOSIS — Z79.899 HIGH RISK MEDICATION USE: ICD-10-CM

## 2024-12-02 RX ORDER — HYDROCHLOROTHIAZIDE 12.5 MG/1
12.5 TABLET ORAL DAILY
Qty: 90 TABLET | Refills: 3 | Status: SHIPPED | OUTPATIENT
Start: 2024-12-02

## 2024-12-02 RX ORDER — CLOPIDOGREL BISULFATE 75 MG/1
75 TABLET ORAL DAILY
Qty: 30 TABLET | OUTPATIENT
Start: 2024-12-02

## 2024-12-02 RX ORDER — ATORVASTATIN CALCIUM 40 MG/1
40 TABLET, FILM COATED ORAL NIGHTLY
Qty: 90 TABLET | Refills: 3 | Status: SHIPPED | OUTPATIENT
Start: 2024-12-02

## 2024-12-04 RX ORDER — HYDROXYCHLOROQUINE SULFATE 200 MG/1
200 TABLET, FILM COATED ORAL 2 TIMES DAILY
Qty: 180 TABLET | Refills: 1 | OUTPATIENT
Start: 2024-12-04

## 2024-12-16 ENCOUNTER — PATIENT MESSAGE (OUTPATIENT)
Dept: INTERNAL MEDICINE | Facility: CLINIC | Age: 79
End: 2024-12-16

## 2024-12-16 ENCOUNTER — OFFICE VISIT (OUTPATIENT)
Dept: INTERNAL MEDICINE | Facility: CLINIC | Age: 79
End: 2024-12-16
Payer: MEDICARE

## 2024-12-16 VITALS
HEIGHT: 69 IN | BODY MASS INDEX: 29.03 KG/M2 | DIASTOLIC BLOOD PRESSURE: 68 MMHG | HEART RATE: 73 BPM | TEMPERATURE: 97.5 F | WEIGHT: 196 LBS | RESPIRATION RATE: 22 BRPM | OXYGEN SATURATION: 96 % | SYSTOLIC BLOOD PRESSURE: 120 MMHG

## 2024-12-16 DIAGNOSIS — R05.9 COUGH, UNSPECIFIED TYPE: Primary | ICD-10-CM

## 2024-12-16 DIAGNOSIS — J10.1 INFLUENZA B: ICD-10-CM

## 2024-12-16 DIAGNOSIS — J40 BRONCHITIS: ICD-10-CM

## 2024-12-16 LAB
EXPIRATION DATE: ABNORMAL
EXPIRATION DATE: NORMAL
FLUAV AG UPPER RESP QL IA.RAPID: NOT DETECTED
FLUBV AG UPPER RESP QL IA.RAPID: DETECTED
INTERNAL CONTROL: ABNORMAL
INTERNAL CONTROL: NORMAL
Lab: ABNORMAL
Lab: NORMAL
S PYO AG THROAT QL: NEGATIVE
SARS-COV-2 AG UPPER RESP QL IA.RAPID: NOT DETECTED

## 2024-12-16 PROCEDURE — 87880 STREP A ASSAY W/OPTIC: CPT | Performed by: STUDENT IN AN ORGANIZED HEALTH CARE EDUCATION/TRAINING PROGRAM

## 2024-12-16 PROCEDURE — 3078F DIAST BP <80 MM HG: CPT | Performed by: STUDENT IN AN ORGANIZED HEALTH CARE EDUCATION/TRAINING PROGRAM

## 2024-12-16 PROCEDURE — 1125F AMNT PAIN NOTED PAIN PRSNT: CPT | Performed by: STUDENT IN AN ORGANIZED HEALTH CARE EDUCATION/TRAINING PROGRAM

## 2024-12-16 PROCEDURE — 99214 OFFICE O/P EST MOD 30 MIN: CPT | Performed by: STUDENT IN AN ORGANIZED HEALTH CARE EDUCATION/TRAINING PROGRAM

## 2024-12-16 PROCEDURE — 87428 SARSCOV & INF VIR A&B AG IA: CPT | Performed by: STUDENT IN AN ORGANIZED HEALTH CARE EDUCATION/TRAINING PROGRAM

## 2024-12-16 PROCEDURE — 3074F SYST BP LT 130 MM HG: CPT | Performed by: STUDENT IN AN ORGANIZED HEALTH CARE EDUCATION/TRAINING PROGRAM

## 2024-12-16 RX ORDER — DEXTROMETHORPHAN HYDROBROMIDE AND PROMETHAZINE HYDROCHLORIDE 15; 6.25 MG/5ML; MG/5ML
5 SYRUP ORAL 4 TIMES DAILY PRN
Qty: 240 ML | Refills: 0 | Status: SHIPPED | OUTPATIENT
Start: 2024-12-16

## 2024-12-16 NOTE — PROGRESS NOTES
Subjective   Marisol Muro is a 79 y.o. female.     History of Present Illness  Patient presents with about 8 days of cough, nasal congestion, chest congestion, thick gray sputum production, subjective fevers, sore throat, fatigue      The following portions of the patient's history were reviewed and updated as appropriate: allergies, current medications, past family history, past medical history, past social history, past surgical history, and problem list.    Review of Systems   All other systems reviewed and are negative.      Objective   Physical Exam  Vitals and nursing note reviewed.   Constitutional:       Appearance: Normal appearance.   HENT:      Head: Normocephalic and atraumatic.      Right Ear: External ear normal.      Left Ear: External ear normal.      Nose: Nose normal.      Mouth/Throat:      Mouth: Mucous membranes are moist.      Pharynx: Oropharynx is clear. No oropharyngeal exudate or posterior oropharyngeal erythema.   Eyes:      Extraocular Movements: Extraocular movements intact.      Conjunctiva/sclera: Conjunctivae normal.      Pupils: Pupils are equal, round, and reactive to light.   Cardiovascular:      Rate and Rhythm: Regular rhythm.      Pulses: Normal pulses.      Heart sounds: Normal heart sounds.   Pulmonary:      Effort: Pulmonary effort is normal.   Abdominal:      General: Abdomen is flat. Bowel sounds are normal.      Palpations: Abdomen is soft.   Musculoskeletal:         General: Normal range of motion.      Cervical back: Normal range of motion.   Skin:     General: Skin is warm.      Capillary Refill: Capillary refill takes less than 2 seconds.   Neurological:      General: No focal deficit present.      Mental Status: She is alert and oriented to person, place, and time. Mental status is at baseline.   Psychiatric:         Mood and Affect: Mood normal.         Behavior: Behavior normal.         Thought Content: Thought content normal.         Judgment: Judgment normal.          Assessment & Plan   Diagnoses and all orders for this visit:    1. Cough, unspecified type (Primary)  -     POCT SARS-CoV-2 Antigen JOLYNN + Flu  -     POCT rapid strep A    2. Influenza B  -     promethazine-dextromethorphan (PROMETHAZINE-DM) 6.25-15 MG/5ML syrup; Take 5 mL by mouth 4 (Four) Times a Day As Needed for Cough.  Dispense: 240 mL; Refill: 0    3. Bronchitis  -     amoxicillin-clavulanate (AUGMENTIN) 875-125 MG per tablet; Take 1 tablet by mouth 2 (Two) Times a Day.  Dispense: 20 tablet; Refill: 0  -     promethazine-dextromethorphan (PROMETHAZINE-DM) 6.25-15 MG/5ML syrup; Take 5 mL by mouth 4 (Four) Times a Day As Needed for Cough.  Dispense: 240 mL; Refill: 0    Flu B was positive but due to patient's age and clinical presentation of greater than a week and wheezes throughout all lung fields believe we should treat for bronchitis as well.

## 2024-12-17 ENCOUNTER — HOSPITAL ENCOUNTER (OUTPATIENT)
Dept: GENERAL RADIOLOGY | Facility: HOSPITAL | Age: 79
Discharge: HOME | End: 2024-12-17
Admitting: STUDENT IN AN ORGANIZED HEALTH CARE EDUCATION/TRAINING PROGRAM
Payer: MEDICARE

## 2024-12-17 DIAGNOSIS — R05.9 COUGH, UNSPECIFIED TYPE: ICD-10-CM

## 2024-12-17 PROCEDURE — 71046 X-RAY EXAM CHEST 2 VIEWS: CPT

## 2024-12-21 DIAGNOSIS — M05.79 RHEUMATOID ARTHRITIS WITH RHEUMATOID FACTOR OF MULTIPLE SITES WITHOUT ORGAN OR SYSTEMS INVOLVEMENT: ICD-10-CM

## 2024-12-21 DIAGNOSIS — Z79.899 HIGH RISK MEDICATION USE: ICD-10-CM

## 2024-12-23 RX ORDER — METHOTREXATE 2.5 MG/1
15 TABLET ORAL WEEKLY
Qty: 72 TABLET | Refills: 0 | Status: SHIPPED | OUTPATIENT
Start: 2024-12-23

## 2025-01-09 ENCOUNTER — TELEPHONE (OUTPATIENT)
Age: 80
End: 2025-01-09
Payer: MEDICARE

## 2025-01-09 DIAGNOSIS — Z79.899 HIGH RISK MEDICATION USE: ICD-10-CM

## 2025-01-09 DIAGNOSIS — M05.79 RHEUMATOID ARTHRITIS WITH RHEUMATOID FACTOR OF MULTIPLE SITES WITHOUT ORGAN OR SYSTEMS INVOLVEMENT: Primary | ICD-10-CM

## 2025-01-09 NOTE — TELEPHONE ENCOUNTER
External standing lab order created for patient primary 8-week labs for monitoring on methotrexate.  Please send to the patient.

## 2025-01-30 ENCOUNTER — PATIENT MESSAGE (OUTPATIENT)
Dept: INTERNAL MEDICINE | Facility: CLINIC | Age: 80
End: 2025-01-30
Payer: MEDICARE

## 2025-01-30 NOTE — TELEPHONE ENCOUNTER
Called patient advised Dr Samson was out of the office and she wanted a Friday appt. Put her with [facility].

## 2025-01-31 ENCOUNTER — OFFICE VISIT (OUTPATIENT)
Dept: INTERNAL MEDICINE | Facility: CLINIC | Age: 80
End: 2025-01-31
Payer: MEDICARE

## 2025-01-31 VITALS
OXYGEN SATURATION: 96 % | WEIGHT: 192 LBS | TEMPERATURE: 97.5 F | SYSTOLIC BLOOD PRESSURE: 128 MMHG | HEART RATE: 70 BPM | HEIGHT: 69 IN | RESPIRATION RATE: 18 BRPM | BODY MASS INDEX: 28.44 KG/M2 | DIASTOLIC BLOOD PRESSURE: 82 MMHG

## 2025-01-31 DIAGNOSIS — J02.9 ACUTE PHARYNGITIS, UNSPECIFIED ETIOLOGY: Primary | ICD-10-CM

## 2025-01-31 DIAGNOSIS — J20.9 ACUTE BRONCHITIS, UNSPECIFIED ORGANISM: ICD-10-CM

## 2025-01-31 LAB
EXPIRATION DATE: NORMAL
INTERNAL CONTROL: NORMAL
Lab: NORMAL
S PYO AG THROAT QL: NEGATIVE

## 2025-01-31 PROCEDURE — 87880 STREP A ASSAY W/OPTIC: CPT | Performed by: PHYSICIAN ASSISTANT

## 2025-01-31 PROCEDURE — 3079F DIAST BP 80-89 MM HG: CPT | Performed by: PHYSICIAN ASSISTANT

## 2025-01-31 PROCEDURE — 1159F MED LIST DOCD IN RCRD: CPT | Performed by: PHYSICIAN ASSISTANT

## 2025-01-31 PROCEDURE — 1160F RVW MEDS BY RX/DR IN RCRD: CPT | Performed by: PHYSICIAN ASSISTANT

## 2025-01-31 PROCEDURE — 3074F SYST BP LT 130 MM HG: CPT | Performed by: PHYSICIAN ASSISTANT

## 2025-01-31 PROCEDURE — 99213 OFFICE O/P EST LOW 20 MIN: CPT | Performed by: PHYSICIAN ASSISTANT

## 2025-01-31 PROCEDURE — 1125F AMNT PAIN NOTED PAIN PRSNT: CPT | Performed by: PHYSICIAN ASSISTANT

## 2025-01-31 RX ORDER — TOLTERODINE 2 MG/1
1 CAPSULE, EXTENDED RELEASE ORAL DAILY
COMMUNITY
Start: 2024-11-27

## 2025-01-31 RX ORDER — METHYLPREDNISOLONE 4 MG/1
TABLET ORAL
Qty: 21 TABLET | Refills: 0 | Status: SHIPPED | OUTPATIENT
Start: 2025-01-31

## 2025-01-31 RX ORDER — DOXYCYCLINE 100 MG/1
100 CAPSULE ORAL 2 TIMES DAILY
Qty: 14 CAPSULE | Refills: 0 | Status: SHIPPED | OUTPATIENT
Start: 2025-01-31 | End: 2025-02-07

## 2025-01-31 RX ORDER — DEXTROMETHORPHAN HYDROBROMIDE AND PROMETHAZINE HYDROCHLORIDE 15; 6.25 MG/5ML; MG/5ML
5 SYRUP ORAL 4 TIMES DAILY PRN
Qty: 473 ML | Refills: 0 | Status: SHIPPED | OUTPATIENT
Start: 2025-01-31

## 2025-01-31 NOTE — PROGRESS NOTES
Office Visit      Patient Name: Marisol Muro  : 1945   MRN: [id number]     Chief Complaint:    Chief Complaint   Patient presents with    Cough     X1 month     Sore Throat     X2 days        History of Present Illness: Marisol Muro is a 80 y.o. female who is here today to discuss cough. On 2024 she was diagnosed with influenza B and bronchitis and was treated with Augmentin and Promethazine DM cough syrup.  Chest x-ray showed bronchitis without pneumonia. She completed the prescribed treatment and feels they did help while taking them but she has continued to cough without much overall improvement. She has also had postnasal drip since cough began. Sore throat is new in the last 2 days. No fever. She tried Mucinex for several days which did not help. Cough is productive for gray to green sputum. No hemoptysis or SOA.         Subjective      I have reviewed and the following portions of the patient's history were updated as appropriate: past family history, past medical history, past social history, past surgical history and problem list.      Current Outpatient Medications:     acetaminophen (TYLENOL) 650 MG 8 hr tablet, Take 1 tablet by mouth 2 (Two) Times a Day As Needed for Mild Pain ., Disp: , Rfl:     acyclovir (ZOVIRAX) 400 MG tablet, Take 1 tablet by mouth Every 4 (Four) Hours While Awake. Take no more than 5 doses a day., Disp: 90 tablet, Rfl: 3    aspirin 81 MG EC tablet, Take 1 tablet by mouth Daily., Disp: 30 tablet, Rfl: 0    atorvastatin (LIPITOR) 40 MG tablet, Take 1 tablet by mouth Every Night., Disp: 90 tablet, Rfl: 3    clopidogrel (PLAVIX) 75 MG tablet, Take 1 tablet by mouth Daily., Disp: , Rfl:     Diclofenac Sodium (VOLTAREN) 1 % gel gel, Apply 2 g topically to the appropriate area as directed Every 12 (Twelve) Hours., Disp: , Rfl:     estradiol (ESTRACE) 0.1 MG/GM vaginal cream, 0.5  grams intravaginal 2x/week (Patient taking differently: 2 g 2 (Two) Times a Week.  "0.5  grams intravaginal 2x/week), Disp: 30 g, Rfl: 11    folic acid (FOLVITE) 1 MG tablet, Take 1 tablet by mouth Daily., Disp: 90 tablet, Rfl: 1    hydroCHLOROthiazide 12.5 MG tablet, Take 1 tablet by mouth Daily., Disp: 90 tablet, Rfl: 3    hydroxychloroquine (PLAQUENIL) 200 MG tablet, Take 1 tablet by mouth 2 (Two) Times a Day., Disp: 180 tablet, Rfl: 1    methotrexate 2.5 MG tablet, Take 6 tablets by mouth 1 (One) Time Per Week. On Sunday, Disp: 72 tablet, Rfl: 0    omeprazole (priLOSEC) 20 MG capsule, Take 1 capsule by mouth Daily., Disp: 90 capsule, Rfl: 3    promethazine-dextromethorphan (PROMETHAZINE-DM) 6.25-15 MG/5ML syrup, Take 5 mL by mouth 4 (Four) Times a Day As Needed for Cough., Disp: 473 mL, Rfl: 0    tolterodine LA (DETROL LA) 2 MG 24 hr capsule, Take 1 capsule by mouth Daily., Disp: , Rfl:     doxycycline (VIBRAMYCIN) 100 MG capsule, Take 1 capsule by mouth 2 (Two) Times a Day for 7 days., Disp: 14 capsule, Rfl: 0    methylPREDNISolone (MEDROL) 4 MG dose pack, Take as directed on package instructions., Disp: 21 tablet, Rfl: 0    Allergies   Allergen Reactions    Latex Rash    Tramadol Nausea Only and Other (See Comments)     GI upset    Wound Dressing Adhesive Rash     slight redness at tape site       Objective     Vital Signs:   Vitals:    01/31/25 1427   BP: 128/82   Pulse: 70   Resp: 18   Temp: 97.5 °F (36.4 °C)   SpO2: 96%   Weight: 87.1 kg (192 lb)   Height: 175.3 cm (69\")     Body mass index is 28.35 kg/m².    Physical Exam  Vitals and nursing note reviewed.   Constitutional:       General: She is not in acute distress.     Appearance: She is well-developed. She is not ill-appearing, toxic-appearing or diaphoretic.   HENT:      Head: Normocephalic and atraumatic.      Right Ear: External ear normal.      Left Ear: External ear normal.      Nose: Rhinorrhea present.      Mouth/Throat:      Mouth: Mucous membranes are moist.      Pharynx: Posterior oropharyngeal erythema present. No " oropharyngeal exudate.   Eyes:      General: No scleral icterus.     Extraocular Movements: Extraocular movements intact.      Conjunctiva/sclera: Conjunctivae normal.      Pupils: Pupils are equal, round, and reactive to light.   Cardiovascular:      Rate and Rhythm: Normal rate and regular rhythm.      Heart sounds: Normal heart sounds. No murmur heard.     No friction rub. No gallop.   Pulmonary:      Effort: Pulmonary effort is normal. No respiratory distress.      Breath sounds: No stridor. Rhonchi (bilateral) present. No wheezing or rales.   Chest:      Chest wall: No tenderness.   Musculoskeletal:         General: No tenderness or deformity. Normal range of motion.      Cervical back: Normal range of motion and neck supple. No rigidity or tenderness.      Right lower leg: No edema.      Left lower leg: No edema.   Lymphadenopathy:      Cervical: No cervical adenopathy.   Skin:     General: Skin is warm and dry.      Capillary Refill: Capillary refill takes less than 2 seconds.      Coloration: Skin is not jaundiced.      Findings: No rash.   Neurological:      Mental Status: She is alert and oriented to person, place, and time.      Cranial Nerves: No cranial nerve deficit.      Sensory: No sensory deficit.      Motor: No abnormal muscle tone.      Coordination: Coordination normal.      Gait: Gait abnormal (walker).      Deep Tendon Reflexes: Reflexes normal.   Psychiatric:         Mood and Affect: Mood normal.         Behavior: Behavior normal.         Thought Content: Thought content normal.         Judgment: Judgment normal.               Assessment / Plan      Assessment/Plan:   Diagnoses and all orders for this visit:    1. Acute pharyngitis, unspecified etiology (Primary)  -     POCT rapid strep A    2. Acute bronchitis, unspecified organism  -     promethazine-dextromethorphan (PROMETHAZINE-DM) 6.25-15 MG/5ML syrup; Take 5 mL by mouth 4 (Four) Times a Day As Needed for Cough.  Dispense: 473 mL;  Refill: 0  -     methylPREDNISolone (MEDROL) 4 MG dose pack; Take as directed on package instructions.  Dispense: 21 tablet; Refill: 0  -     doxycycline (VIBRAMYCIN) 100 MG capsule; Take 1 capsule by mouth 2 (Two) Times a Day for 7 days.  Dispense: 14 capsule; Refill: 0       Negative for strep in office today. Begin doxycycline and medrol dosepak. May use cough syrup as needed.             Follow Up:   Return if symptoms worsen or fail to improve.    Patient was given instructions and counseling regarding her condition or for health maintenance advice. Please see specific information pulled into the AVS if appropriate.     Karen Lyon PA-C  [facility]     Please note that portions of this note may have been completed with a voice recognition program.

## 2025-03-30 DIAGNOSIS — Z79.899 HIGH RISK MEDICATION USE: ICD-10-CM

## 2025-03-30 DIAGNOSIS — M05.79 RHEUMATOID ARTHRITIS WITH RHEUMATOID FACTOR OF MULTIPLE SITES WITHOUT ORGAN OR SYSTEMS INVOLVEMENT: ICD-10-CM

## 2025-03-31 RX ORDER — METHOTREXATE 2.5 MG/1
15 TABLET ORAL WEEKLY
Qty: 72 TABLET | Refills: 0 | Status: SHIPPED | OUTPATIENT
Start: 2025-03-31

## 2025-04-15 ENCOUNTER — OFFICE VISIT (OUTPATIENT)
Dept: OBSTETRICS AND GYNECOLOGY | Facility: CLINIC | Age: 80
End: 2025-04-15
Payer: MEDICARE

## 2025-04-15 VITALS
HEIGHT: 69 IN | SYSTOLIC BLOOD PRESSURE: 160 MMHG | WEIGHT: 193 LBS | DIASTOLIC BLOOD PRESSURE: 84 MMHG | BODY MASS INDEX: 28.58 KG/M2

## 2025-04-15 DIAGNOSIS — R30.9 PAIN WITH URINATION: ICD-10-CM

## 2025-04-15 DIAGNOSIS — N95.2 POSTMENOPAUSAL ATROPHIC VAGINITIS: ICD-10-CM

## 2025-04-15 DIAGNOSIS — N81.4 UTERINE PROLAPSE: ICD-10-CM

## 2025-04-15 DIAGNOSIS — R39.198 DIFFICULTY VOIDING: ICD-10-CM

## 2025-04-15 DIAGNOSIS — N81.11 CYSTOCELE, MIDLINE: ICD-10-CM

## 2025-04-15 DIAGNOSIS — Z46.89 ENCOUNTER FOR PESSARY MAINTENANCE: Primary | ICD-10-CM

## 2025-04-15 DIAGNOSIS — N81.6 RECTOCELE: ICD-10-CM

## 2025-04-15 DIAGNOSIS — R39.9 URINARY TRACT INFECTION SYMPTOMS: ICD-10-CM

## 2025-04-15 RX ORDER — PHENAZOPYRIDINE HYDROCHLORIDE 100 MG/1
100 TABLET, FILM COATED ORAL 3 TIMES DAILY PRN
Qty: 21 TABLET | Refills: 0 | Status: SHIPPED | OUTPATIENT
Start: 2025-04-15

## 2025-04-15 RX ORDER — ESTRADIOL 0.1 MG/G
CREAM VAGINAL
Qty: 30 G | Refills: 11 | Status: SHIPPED | OUTPATIENT
Start: 2025-04-15

## 2025-04-15 RX ORDER — SULFAMETHOXAZOLE AND TRIMETHOPRIM 800; 160 MG/1; MG/1
1 TABLET ORAL 2 TIMES DAILY
Qty: 14 TABLET | Refills: 0 | Status: SHIPPED | OUTPATIENT
Start: 2025-04-15 | End: 2025-04-22

## 2025-04-17 LAB
APPEARANCE UR: CLEAR
BACTERIA #/AREA URNS HPF: NORMAL /HPF
BACTERIA UR CULT: NO GROWTH
BACTERIA UR CULT: NORMAL
BILIRUB UR QL STRIP: NEGATIVE
CASTS URNS MICRO: NORMAL
COLOR UR: YELLOW
EPI CELLS #/AREA URNS HPF: NORMAL /HPF
GLUCOSE UR QL STRIP: NEGATIVE
HGB UR QL STRIP: NEGATIVE
KETONES UR QL STRIP: NEGATIVE
LEUKOCYTE ESTERASE UR QL STRIP: ABNORMAL
NITRITE UR QL STRIP: NEGATIVE
PH UR STRIP: 6 [PH] (ref 5–8)
PROT UR QL STRIP: ABNORMAL
RBC #/AREA URNS HPF: NORMAL /HPF
SP GR UR STRIP: 1.02 (ref 1–1.03)
UROBILINOGEN UR STRIP-MCNC: ABNORMAL MG/DL
WBC #/AREA URNS HPF: NORMAL /HPF

## 2025-04-18 NOTE — PROGRESS NOTES
Chief Complaint  Pessary Check (Follow up pessary check, patient complains of possible urinary infection. )     History of Present Illness:  Patient is 80 y.o.  who presents to [facility] here for follow-up evaluation of her prolapse and pessary.  Patient also with complaints of difficulty voiding and discomfort.  Patient is unable to void today for urine specimen.  She reports voiding shortly prior to her arrival.  She denies any flank pain, fever, or chills.  She feels like the pessary has stayed in place.  She reports at times she has noted episodes of spotting when wiping only.  She denies any pain or discomfort.  She reports good relief with her pessary.  She see's Dr. Samson for her primary care and reports no major changes in her health or medications.    History  Past Medical History:   Diagnosis Date    Asymptomatic bacteriuria     Cataract     CKD (chronic kidney disease), stage III     COVID-19 vaccine series completed     Moderna    Difficulty walking     Ear piercing     Elevated cholesterol     Esophageal stricture     Fracture of metatarsal bone of left foot     GERD (gastroesophageal reflux disease)     Headache, tension-type     Hearing aid worn     High risk medication use     History of prolapse of bladder     HL (hearing loss)     no hearing aids    Hyperlipidemia     Hypertension     Impaired fasting glucose     Obesity     Osteoarthritis     Osteoarthritis of hand     Osteopenia     Rheumatoid arthritis     Rheumatoid arthritis with rheumatoid factor of multiple sites without organ or systems involvement     Rheumatoid nodule     Shingles     Skin cancer     left ankle    Stroke     TIA    TIA (transient ischemic attack) 2022    Trigger finger, right     Urinary tract infection     Vision loss     Wears glasses      Current Outpatient Medications on File Prior to Visit   Medication Sig Dispense Refill    acetaminophen (TYLENOL) 650 MG 8 hr tablet Take 1  tablet by mouth 2 (Two) Times a Day As Needed for Mild Pain .      acyclovir (ZOVIRAX) 400 MG tablet Take 1 tablet by mouth Every 4 (Four) Hours While Awake. Take no more than 5 doses a day. 90 tablet 3    aspirin 81 MG EC tablet Take 1 tablet by mouth Daily. 30 tablet 0    atorvastatin (LIPITOR) 40 MG tablet Take 1 tablet by mouth Every Night. 90 tablet 3    clopidogrel (PLAVIX) 75 MG tablet Take 1 tablet by mouth Daily.      Diclofenac Sodium (VOLTAREN) 1 % gel gel Apply 2 g topically to the appropriate area as directed Every 12 (Twelve) Hours.      folic acid (FOLVITE) 1 MG tablet Take 1 tablet by mouth Daily. 90 tablet 1    hydroCHLOROthiazide 12.5 MG tablet Take 1 tablet by mouth Daily. 90 tablet 3    hydroxychloroquine (PLAQUENIL) 200 MG tablet Take 1 tablet by mouth 2 (Two) Times a Day. 180 tablet 1    methotrexate 2.5 MG tablet Take 6 tablets by mouth 1 (One) Time Per Week. On Sunday 72 tablet 0    methylPREDNISolone (MEDROL) 4 MG dose pack Take as directed on package instructions. 21 tablet 0    omeprazole (priLOSEC) 20 MG capsule Take 1 capsule by mouth Daily. 90 capsule 3    promethazine-dextromethorphan (PROMETHAZINE-DM) 6.25-15 MG/5ML syrup Take 5 mL by mouth 4 (Four) Times a Day As Needed for Cough. 473 mL 0    tolterodine LA (DETROL LA) 2 MG 24 hr capsule Take 1 capsule by mouth Daily.       No current facility-administered medications on file prior to visit.     Allergies   Allergen Reactions    Latex Rash    Tramadol Nausea Only and Other (See Comments)     GI upset    Wound Dressing Adhesive Rash     slight redness at tape site     Past Surgical History:   Procedure Laterality Date    CATARACT EXTRACTION W/ INTRAOCULAR LENS IMPLANT Right 08/28/2023    Procedure: CATARACT PHACO EXTRACTION WITH INTRAOCULAR LENS IMPLANT RIGHT;  Surgeon: Astrid Shannon MD;  Location: [facility];  Service: Ophthalmology;  Laterality: Right;    CATARACT EXTRACTION W/ INTRAOCULAR LENS IMPLANT Left 09/25/2023    Procedure:  "CATARACT PHACO EXTRACTION WITH INTRAOCULAR LENS IMPLANT LEFT;  Surgeon: Astrid Shannon MD;  Location: [facility];  Service: Ophthalmology;  Laterality: Left;    COLONOSCOPY      ENDOSCOPY      ESOPHAGEAL DILATATION      GROIN HIDRADENITIS EXCISION Right 10/29/2021    Procedure: GROIN HIDRADENITIS EXCISION with excision of right leg mass;  Surgeon: Tanmay Clements MD;  Location: [facility];  Service: General;  Laterality: Right;    JOINT REPLACEMENT      LAPAROSCOPIC TUBAL LIGATION      REPLACEMENT TOTAL KNEE BILATERAL      ,     SKIN BIOPSY      SKIN CANCER EXCISION      TOTAL HIP ARTHROPLASTY  2014    right     Family History   Problem Relation Age of Onset    Stroke Mother     Arthritis Mother     Heart disease Mother     Dementia Mother          at 85 yrs. old    Osteoporosis Mother     Lung cancer Father     Cancer Father     Hyperlipidemia Sister     Hypertension Sister      Social History     Socioeconomic History    Marital status:      Spouse name: [facility]    Number of children: 2   Tobacco Use    Smoking status: Former     Current packs/day: 0.00     Average packs/day: 1 pack/day for 21.6 years (21.6 ttl pk-yrs)     Types: Cigarettes     Start date: 1961     Quit date: 1983     Years since quittin.3     Passive exposure: Past    Smokeless tobacco: Never   Vaping Use    Vaping status: Never Used   Substance and Sexual Activity    Alcohol use: Not Currently     Comment: rare     Drug use: Never    Sexual activity: Not Currently     Partners: Male     Birth control/protection: Tubal ligation       Physical Examination:  Vital Signs: /84   Ht 175.3 cm (69\")   Wt 87.5 kg (193 lb)   BMI 28.50 kg/m²     General Appearance: alert, appears stated age, and cooperative  Breasts: Not performed.  Abdomen: no masses, no hepatomegaly, no splenomegaly, soft non-tender, no guarding, and no rebound tenderness  Pelvic: Clinical staff was present for exam; pelvic examination was required " for evaluation  External genitalia:  normal appearance of the external genitalia including Bartholin's and [place]'s glands.  :  urethral meatus normal;  Vaginal:  atrophic mucosal changes are present;  Cervix:  normal appearance.  Uterus:  normal size, shape and consistency.  Adnexa:  non palpable bilaterally.  Cystocele GRADE 2  Rectocele GRADE 2  Uterine GRADE 2  Pessary was removed, cleaned, and reinserted without difficulty  In and out catheterization performed under sterile technique by me with clear, yellow urine obtained and sent for ua, culture.  Pt tolerated procedure well.  Approximately 100 cc of urine obtained.    Data Review:  The following data was reviewed by: Rafia Owens MD on 04/15/2025:     Labs:  LABS SCANNED (03/20/2025)   Imaging:    Medical Records:  None    Assessment and Plan   1. Cystocele, midline  There are no changes noted on examination.  Have discussed with the patient the various options for management of her symptoms.  She desires to continue with her currently prescribed pessary.  Instructions and precautions have been given.  Patient to follow-up as discussed.    2. Rectocele  There are no changes noted on examination.  Have stressed to the patient the need for aggressive management of her constipation.  Have discussed with the patient no heavy lifting or straining.    3. Encounter for pessary maintenance  The patient's pessary was removed, cleaned, and reinserted.  Instructions and precautions were given.  The patient is to follow up as discussed.     4. Uterine prolapse  There are no changes noted on examination.  Pt desires to continue with her currently rx pessary as noted.    5. Urinary tract infection symptoms  Will send urine for cath ua, microscopic, and culture.  - Urine Culture - Urine, Urine, Catheter In/Out  - Urinalysis With Microscopic - Urine, Clean Catch    6. Postmenopausal atrophic vaginitis  I have discussed with the patient the need to continue with her  estrogen cream.  Rx given as noted; instructions and precautions given.  - estradiol (ESTRACE) 0.1 MG/GM vaginal cream; 0.5  grams intravaginal 2x/week  Dispense: 30 g; Refill: 11    7. Difficulty voiding  Pt instructed to apply the estrogen cream to the periurethral area twice weekly.  - Urine Culture - Urine, Urine, Catheter In/Out  - Urinalysis With Microscopic - Urine, Clean Catch    8. Pain with urination  Will send urine for cath UA culture and sensitivity.  Prescription is given for Bactrim and Pyridium.  Patient to increase her p.o. fluids.  Patient to call for her culture results.  - Urine Culture - Urine, Urine, Catheter In/Out  - Urinalysis With Microscopic - Urine, Clean Catch  - sulfamethoxazole-trimethoprim (Bactrim DS) 800-160 MG per tablet; Take 1 tablet by mouth 2 (Two) Times a Day for 7 days.  Dispense: 14 tablet; Refill: 0  - phenazopyridine (Pyridium) 100 MG tablet; Take 1 tablet by mouth 3 (Three) Times a Day As Needed for Bladder Spasms.  Dispense: 21 tablet; Refill: 0    Follow Up/Instructions:  Follow up as noted.  Patient was given instructions and counseling regarding Marisol's condition or for health maintenance advice. Please see specific information pulled into the AVS if appropriate.     Note: Speech recognition transcription software may have been used to dictate portions of this document.  An attempt at proofreading has been made though minor errors in transcription may still be present.    This note was electronically signed.  Rafia Owens M.D.

## 2025-05-05 RX ORDER — TOLTERODINE 2 MG/1
2 CAPSULE, EXTENDED RELEASE ORAL DAILY
Qty: 90 CAPSULE | Refills: 3 | Status: SHIPPED | OUTPATIENT
Start: 2025-05-05

## 2025-05-07 DIAGNOSIS — M05.79 RHEUMATOID ARTHRITIS WITH RHEUMATOID FACTOR OF MULTIPLE SITES WITHOUT ORGAN OR SYSTEMS INVOLVEMENT: ICD-10-CM

## 2025-05-07 DIAGNOSIS — Z79.899 HIGH RISK MEDICATION USE: ICD-10-CM

## 2025-05-07 RX ORDER — METHOTREXATE 2.5 MG/1
TABLET ORAL
Qty: 72 TABLET | Refills: 0 | OUTPATIENT
Start: 2025-05-07

## 2025-05-07 NOTE — TELEPHONE ENCOUNTER
Declining methotrexate refill as it has been requested too soon. Last rx sent for 12 week supply on 3/31/25. Should not need refill until 6/16/25. Patient has appointment 5/15.    HUB OK TO RELAY

## 2025-05-07 NOTE — PROGRESS NOTES
Office Follow Up      Date: 05/15/2025   Patient Name: Marisol Muro  MRN: [id number]  YOB: 1945    Referring Physician: No ref. provider found     Chief Complaint:   Chief Complaint   Patient presents with    Follow-up    Rheumatoid Arthritis       History of Present Illness: Marisol Muro is a 80 y.o. female who is here today for follow up on  rheumatoid arthritis.      She was last seen in clinic 9/2024 with Dr. Diamond.    Continues on methotrexate and Plaquenil.  Eye exam stable and she has another exam upcoming next month.   She rates her pain 5/10 and has 45 minutes of morning stiffness.   No recent flares. No swollen joints. No side effects to medicines. No serious infections.    She had TIA 8/5/22 for which she went to [facility].  She is now on Plavix and statin  Following with Neurology.      Subjective       Review of Systems: Review of Systems   All other systems reviewed and are negative.       Medications:   Current Outpatient Medications:     acetaminophen (TYLENOL) 650 MG 8 hr tablet, Take 1 tablet by mouth 2 (Two) Times a Day As Needed for Mild Pain ., Disp: , Rfl:     acyclovir (ZOVIRAX) 400 MG tablet, Take 1 tablet by mouth Every 4 (Four) Hours While Awake. Take no more than 5 doses a day., Disp: 90 tablet, Rfl: 3    aspirin 81 MG EC tablet, Take 1 tablet by mouth Daily., Disp: 30 tablet, Rfl: 0    atorvastatin (LIPITOR) 40 MG tablet, Take 1 tablet by mouth Every Night., Disp: 90 tablet, Rfl: 3    clopidogrel (PLAVIX) 75 MG tablet, Take 1 tablet by mouth Daily., Disp: , Rfl:     Diclofenac Sodium (VOLTAREN) 1 % gel gel, Apply 2 g topically to the appropriate area as directed Every 12 (Twelve) Hours., Disp: , Rfl:     estradiol (ESTRACE) 0.1 MG/GM vaginal cream, 0.5  grams intravaginal 2x/week, Disp: 30 g, Rfl: 11    folic acid (FOLVITE) 1 MG tablet, Take 1 tablet by mouth Daily., Disp: 90 tablet, Rfl: 1    hydroCHLOROthiazide 12.5 MG  "tablet, Take 1 tablet by mouth Daily., Disp: 90 tablet, Rfl: 3    hydroxychloroquine (PLAQUENIL) 200 MG tablet, Take 1 tablet by mouth 2 (Two) Times a Day., Disp: 180 tablet, Rfl: 1    methotrexate 2.5 MG tablet, Take 6 tablets by mouth 1 (One) Time Per Week. On Sunday, Disp: 72 tablet, Rfl: 0    omeprazole (priLOSEC) 20 MG capsule, Take 1 capsule by mouth Daily., Disp: 90 capsule, Rfl: 3    tolterodine LA (DETROL LA) 2 MG 24 hr capsule, TAKE 1 CAPSULE BY MOUTH EVERY DAY, Disp: 90 capsule, Rfl: 3    Allergies:   Allergies   Allergen Reactions    Latex Rash    Tramadol Nausea Only and Other (See Comments)     GI upset    Wound Dressing Adhesive Rash     slight redness at tape site       I have reviewed and updated the patient's chief complaint, history of present illness, review of systems, past medical history, surgical history, family history, social history, medications and allergy list as appropriate.     Objective        Vital Signs:   Vitals:    05/15/25 1400   BP: 142/84   Pulse: 76   Temp: 98.1 °F (36.7 °C)   Weight: 85.3 kg (188 lb)   Height: 175.3 cm (69\")   PainSc: 5        Body mass index is 27.76 kg/m².  Defer to PCP      Physical Exam:  Physical Exam   MUSCULOSKELETAL:   No peripheral synovitis  Well-healed scar bilateral knee from joint replacement    Complete joint exam was performed including the MCPs, PIPs, DIPs of the hands, wrists, elbows, shoulders, hips, knees and ankles.  No soft tissue swelling or tenderness is present except as above.    General: The patient is well-developed and well nourished. Cooperative, alert and oriented. Affect is normal. Hydration appears normal.   HEENT: Normocephalic and atraumatic. No notable alopecia. Lids and conjunctiva are normal. Pupils are equal and sclera are clear. Oropharynx is clear   NECK neck is supple without adenopathy, masses or thyromegaly.   CARDIOVASCULAR: Regular rate and rhythm.   LUNGS: Effort is normal.   ABDOMEN: Not examined  EXTREMITIES: " Trace BLE edema. Peripheral pulses are intact. No clubbing.   SKIN: No rashes. No subcutaneous nodules. No digital ulcers. No sclerodactyly.   NEUROLOGIC: Gait is normal. Strength testing is normal.  No focal neurologic deficits    Results Review:   Labs:   Lab Results   Component Value Date    GLUCOSE 113 (H) 06/12/2023    BUN 11 06/12/2023    CREATININE 0.72 06/12/2023    EGFR 84.6 06/12/2023    BCR 15.3 06/12/2023    K 3.7 06/12/2023    CO2 23.9 06/12/2023    CALCIUM 9.5 06/12/2023    ALBUMIN 4.0 06/12/2023    BILITOT 0.6 06/12/2023    AST 20 06/12/2023    ALT 13 06/12/2023     Lab Results   Component Value Date    WBC 6.05 06/12/2023    HGB 12.3 06/12/2023    HCT 38.0 06/12/2023    MCV 92.7 06/12/2023     06/12/2023       Assessment / Plan        Rheumatoid arthritis with rheumatoid factor of multiple sites without organ or systems involvement  dx 2010; CCP+>250; +RF; Nodules present  *mtx 2010 start  *plaquenil 4.9.16  Avoids NSAIDs with CKD     In remission. Swollen joint count 0. Tender joint count 0.  Good prognosis.  Continue Plaquenil 200 mg twice daily and mtx 15 mg p.o. once weekly.   Plaquenil eye exams yearly for toxicity monitoring.  Medications well tolerated without signs of toxicity  Recent CMP 3/20/25 reviewed and stable.  New standing order printed for her today.  Plan will be to continue current medication, frequent intensive lab monitoring every 8-12 weeks (CBC, CMP) for toxicity monitoring and follow up in 4 months    High risk medication use  Risks of methotrexate include but are not limited to severe liver damage that can be fatal, the possible need for liver biopsy, bone marrow suppression that can lead to dangerously low blood counts, GI side effects including mouth sores and diarrhea, fatigue, and rare risk of severe pulmonary complications. There should be no alcohol consumed with MTX. MTX can cause severe fetal abnormalities whether the mother or father is taking the medication  and thus must be avoided if pregnancy is a possibility.  All medication is to be taken one day a week only. The need for q 8-12 week labs and the need for folic acid supplementation were discussed.    We discussed possible side effects of hydroxychloroquine including headache, nausea, diarrhea, rare retinal pigmentation, rare neuromuscular toxicity.  Recommend eye exams every 6 to 12 months to monitor for retinal toxicity.    Immunosuppression due to drug therapy  No signs of toxicity. Medications well tolerated and effective   No recent illness/infections  Continue labs every 2-3 months for monitoring.      Osteopenia, unspecified location  DEXA ACL performed 11/15/2021 showing osteopenia.  Continue calcium vitamin D and weightbearing exercise    Monitor bone density scan every 2-3 years- will order when back at [place]  Will avoid bisphosphonates for the time being given her chronic kidney disease    Osteoarthritis of multiple joints, unspecified osteoarthritis type  Avoids NSAIDs with CKD, history of stroke    Presence of artificial knee joint, bilateral   Doing well    History of right hip replacement   Doing well     TIA (transient ischemic attack)  History of initial TIA 8/5/22 for which she went to [facility]  Now on Plavix and statin  Continue follow up with [facility]     Assessment & Plan  Rheumatoid arthritis with rheumatoid factor 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 Diamond.      HARSHA Abreu   [facility] [place]

## 2025-05-08 ENCOUNTER — OFFICE VISIT (OUTPATIENT)
Dept: INTERNAL MEDICINE | Facility: CLINIC | Age: 80
End: 2025-05-08
Payer: MEDICARE

## 2025-05-08 VITALS
HEART RATE: 66 BPM | DIASTOLIC BLOOD PRESSURE: 74 MMHG | OXYGEN SATURATION: 98 % | TEMPERATURE: 98.3 F | RESPIRATION RATE: 18 BRPM | SYSTOLIC BLOOD PRESSURE: 166 MMHG | WEIGHT: 193 LBS | BODY MASS INDEX: 28.5 KG/M2

## 2025-05-08 DIAGNOSIS — R26.9 GAIT ABNORMALITY: ICD-10-CM

## 2025-05-08 DIAGNOSIS — M05.79 RHEUMATOID ARTHRITIS WITH RHEUMATOID FACTOR OF MULTIPLE SITES WITHOUT ORGAN OR SYSTEMS INVOLVEMENT: ICD-10-CM

## 2025-05-08 DIAGNOSIS — R26.9 GAIT DISTURBANCE: ICD-10-CM

## 2025-05-08 DIAGNOSIS — I10 ESSENTIAL HYPERTENSION: Primary | ICD-10-CM

## 2025-05-08 NOTE — PROGRESS NOTES
Subjective  Marisol Muro is a 80 y.o. female    HPI coming in for follow-up patient with a history of rheumatoid arthritis has had complaints of imbalance with lower extremity weakness history of hypertension.  She is ambulating reasonably well at home    The following portions of the patient's history were reviewed and updated as appropriate: allergies, current medications, past family history, past medical history, past social history, past surgical history, and problem list.     Review of Systems   Constitutional:  Negative for activity change, appetite change, chills, diaphoresis, fatigue and fever.   HENT:  Negative for congestion, ear discharge, ear pain, sore throat and trouble swallowing.    Eyes:  Negative for photophobia and visual disturbance.   Respiratory:  Negative for cough and shortness of breath.    Cardiovascular:  Negative for chest pain and palpitations.   Gastrointestinal:  Negative for abdominal distention, abdominal pain, constipation, diarrhea, nausea and vomiting.   Endocrine: Negative.    Genitourinary:  Positive for frequency and urgency. Negative for dysuria and hematuria.   Musculoskeletal:  Negative for arthralgias, back pain, joint swelling, myalgias and neck pain.   Skin:  Negative for color change and rash.   Allergic/Immunologic: Negative.    Neurological:  Positive for weakness and headaches. Negative for dizziness, light-headedness and numbness.   Hematological:  Negative for adenopathy. Does not bruise/bleed easily.   Psychiatric/Behavioral:  Positive for sleep disturbance. Negative for agitation, confusion and dysphoric mood. The patient is not nervous/anxious.        Visit Vitals  /74   Pulse 66   Temp 98.3 °F (36.8 °C)   Resp 18   Wt 87.5 kg (193 lb)   SpO2 98%   BMI 28.50 kg/m²       Objective  Physical Exam  Constitutional:       General: She is not in acute distress.     Appearance: She is well-developed.   HENT:      Nose: Nose normal.   Eyes:      General: No  scleral icterus.     Conjunctiva/sclera: Conjunctivae normal.   Neck:      Thyroid: No thyromegaly.      Trachea: No tracheal deviation.   Cardiovascular:      Rate and Rhythm: Normal rate and regular rhythm.      Heart sounds: No murmur heard.     No friction rub.   Pulmonary:      Effort: No respiratory distress.      Breath sounds: No wheezing or rales.   Abdominal:      General: There is no distension.      Palpations: Abdomen is soft. There is no mass.      Tenderness: There is no abdominal tenderness. There is no guarding.   Musculoskeletal:         General: Deformity present. Normal range of motion.   Lymphadenopathy:      Cervical: No cervical adenopathy.   Skin:     General: Skin is warm and dry.      Findings: No erythema or rash.   Neurological:      Mental Status: She is alert and oriented to person, place, and time.      Cranial Nerves: No cranial nerve deficit.      Coordination: Coordination normal.      Deep Tendon Reflexes: Reflexes are normal and symmetric.   Psychiatric:         Behavior: Behavior normal.         Thought Content: Thought content normal.         Judgment: Judgment normal.       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  Discussed walking regimen, weight loss    Gait disturbance

## 2025-05-15 ENCOUNTER — OFFICE VISIT (OUTPATIENT)
Age: 80
End: 2025-05-15
Payer: MEDICARE

## 2025-05-15 VITALS
DIASTOLIC BLOOD PRESSURE: 84 MMHG | HEIGHT: 69 IN | TEMPERATURE: 98.1 F | WEIGHT: 188 LBS | SYSTOLIC BLOOD PRESSURE: 142 MMHG | HEART RATE: 76 BPM | BODY MASS INDEX: 27.85 KG/M2

## 2025-05-15 DIAGNOSIS — Z79.899 HIGH RISK MEDICATION USE: ICD-10-CM

## 2025-05-15 DIAGNOSIS — M05.79 RHEUMATOID ARTHRITIS WITH RHEUMATOID FACTOR OF MULTIPLE SITES WITHOUT ORGAN OR SYSTEMS INVOLVEMENT: ICD-10-CM

## 2025-05-15 PROBLEM — Z96.651 HISTORY OF TOTAL RIGHT KNEE REPLACEMENT: Status: ACTIVE | Noted: 2017-07-20

## 2025-05-15 RX ORDER — FOLIC ACID 1 MG/1
1 TABLET ORAL DAILY
Qty: 90 TABLET | Refills: 1 | Status: SHIPPED | OUTPATIENT
Start: 2025-05-15

## 2025-05-15 RX ORDER — METHOTREXATE 2.5 MG/1
15 TABLET ORAL WEEKLY
Qty: 72 TABLET | Refills: 0 | Status: SHIPPED | OUTPATIENT
Start: 2025-05-15

## 2025-05-15 RX ORDER — HYDROXYCHLOROQUINE SULFATE 200 MG/1
200 TABLET, FILM COATED ORAL 2 TIMES DAILY
Qty: 180 TABLET | Refills: 1 | Status: SHIPPED | OUTPATIENT
Start: 2025-05-15

## 2025-07-09 LAB
ALBUMIN SERPL-MCNC: 3.7 G/DL (ref 3.7–4.7)
ALP SERPL-CCNC: 70 IU/L (ref 44–121)
ALT SERPL-CCNC: 12 IU/L (ref 0–32)
AST SERPL-CCNC: 22 IU/L (ref 0–40)
BILIRUB SERPL-MCNC: 0.4 MG/DL (ref 0–1.2)
BUN SERPL-MCNC: 14 MG/DL (ref 8–27)
BUN/CREAT SERPL: 18 (ref 12–28)
CALCIUM SERPL-MCNC: 9.4 MG/DL (ref 8.7–10.3)
CHLORIDE SERPL-SCNC: 100 MMOL/L (ref 96–106)
CO2 SERPL-SCNC: 25 MMOL/L (ref 20–29)
CREAT SERPL-MCNC: 0.78 MG/DL (ref 0.57–1)
CRP SERPL-MCNC: 4 MG/L (ref 0–10)
EGFRCR SERPLBLD CKD-EPI 2021: 76 ML/MIN/1.73
ERYTHROCYTE [DISTWIDTH] IN BLOOD BY AUTOMATED COUNT: 15.9 % (ref 11.7–15.4)
ERYTHROCYTE [SEDIMENTATION RATE] IN BLOOD BY WESTERGREN METHOD: 7 MM/HR (ref 0–40)
GLOBULIN SER CALC-MCNC: 1.9 G/DL (ref 1.5–4.5)
GLUCOSE SERPL-MCNC: 124 MG/DL (ref 70–99)
HCT VFR BLD AUTO: 37.5 % (ref 34–46.6)
HGB BLD-MCNC: 11.5 G/DL (ref 11.1–15.9)
MCH RBC QN AUTO: 28.3 PG (ref 26.6–33)
MCHC RBC AUTO-ENTMCNC: 30.7 G/DL (ref 31.5–35.7)
MCV RBC AUTO: 92 FL (ref 79–97)
PLATELET # BLD AUTO: 276 X10E3/UL (ref 150–450)
POTASSIUM SERPL-SCNC: 4.1 MMOL/L (ref 3.5–5.2)
PROT SERPL-MCNC: 5.6 G/DL (ref 6–8.5)
RBC # BLD AUTO: 4.07 X10E6/UL (ref 3.77–5.28)
SODIUM SERPL-SCNC: 139 MMOL/L (ref 134–144)
WBC # BLD AUTO: 6.7 X10E3/UL (ref 3.4–10.8)

## 2025-07-22 RX ORDER — HYDROCHLOROTHIAZIDE 12.5 MG/1
12.5 TABLET ORAL DAILY
Qty: 90 TABLET | Refills: 3 | Status: SHIPPED | OUTPATIENT
Start: 2025-07-22

## 2025-08-04 ENCOUNTER — APPOINTMENT (OUTPATIENT)
Dept: CT IMAGING | Facility: HOSPITAL | Age: 80
End: 2025-08-04
Payer: MEDICARE

## 2025-08-04 ENCOUNTER — HOSPITAL ENCOUNTER (EMERGENCY)
Facility: HOSPITAL | Age: 80
Discharge: HOME | End: 2025-08-04
Attending: STUDENT IN AN ORGANIZED HEALTH CARE EDUCATION/TRAINING PROGRAM | Admitting: STUDENT IN AN ORGANIZED HEALTH CARE EDUCATION/TRAINING PROGRAM
Payer: MEDICARE

## 2025-08-04 VITALS
OXYGEN SATURATION: 94 % | HEIGHT: 69 IN | TEMPERATURE: 98 F | WEIGHT: 183 LBS | HEART RATE: 77 BPM | RESPIRATION RATE: 16 BRPM | DIASTOLIC BLOOD PRESSURE: 72 MMHG | SYSTOLIC BLOOD PRESSURE: 139 MMHG | BODY MASS INDEX: 27.11 KG/M2

## 2025-08-04 DIAGNOSIS — S00.83XA CONTUSION OF FACE, INITIAL ENCOUNTER: ICD-10-CM
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
## 2025-08-04 DIAGNOSIS — S01.112A EYEBROW LACERATION, LEFT, INITIAL ENCOUNTER: ICD-10-CM

## 2025-08-04 PROCEDURE — 99284 EMERGENCY DEPT VISIT MOD MDM: CPT | Performed by: STUDENT IN AN ORGANIZED HEALTH CARE EDUCATION/TRAINING PROGRAM

## 2025-08-04 PROCEDURE — 90471 IMMUNIZATION ADMIN: CPT | Performed by: STUDENT IN AN ORGANIZED HEALTH CARE EDUCATION/TRAINING PROGRAM

## 2025-08-04 PROCEDURE — 90715 TDAP VACCINE 7 YRS/> IM: CPT | Performed by: STUDENT IN AN ORGANIZED HEALTH CARE EDUCATION/TRAINING PROGRAM

## 2025-08-04 PROCEDURE — 63710000001 ONDANSETRON ODT 4 MG TABLET DISPERSIBLE: Performed by: STUDENT IN AN ORGANIZED HEALTH CARE EDUCATION/TRAINING PROGRAM

## 2025-08-04 PROCEDURE — 25010000002 LIDOCAINE 1 % SOLUTION: Performed by: STUDENT IN AN ORGANIZED HEALTH CARE EDUCATION/TRAINING PROGRAM

## 2025-08-04 PROCEDURE — 25010000002 TETANUS-DIPHTH-ACELL PERTUSSIS 5-2.5-18.5 LF-MCG/0.5 SUSPENSION PREFILLED SYRINGE: Performed by: STUDENT IN AN ORGANIZED HEALTH CARE EDUCATION/TRAINING PROGRAM

## 2025-08-04 PROCEDURE — 72125 CT NECK SPINE W/O DYE: CPT

## 2025-08-04 PROCEDURE — 70486 CT MAXILLOFACIAL W/O DYE: CPT

## 2025-08-04 PROCEDURE — 70450 CT HEAD/BRAIN W/O DYE: CPT

## 2025-08-04 RX ORDER — LIDOCAINE HYDROCHLORIDE 10 MG/ML
10 INJECTION, SOLUTION INFILTRATION; PERINEURAL ONCE
Status: COMPLETED | OUTPATIENT
Start: 2025-08-04 | End: 2025-08-04

## 2025-08-04 RX ORDER — ONDANSETRON 4 MG/1
4 TABLET, ORALLY DISINTEGRATING ORAL ONCE
Status: COMPLETED | OUTPATIENT
Start: 2025-08-04 | End: 2025-08-04

## 2025-08-04 RX ORDER — HYDROCODONE BITARTRATE AND ACETAMINOPHEN 5; 325 MG/1; MG/1
1 TABLET ORAL ONCE
Refills: 0 | Status: COMPLETED | OUTPATIENT
Start: 2025-08-04 | End: 2025-08-04

## 2025-08-04 RX ADMIN — TETANUS TOXOID, REDUCED DIPHTHERIA TOXOID AND ACELLULAR PERTUSSIS VACCINE, ADSORBED 0.5 ML: 5; 2.5; 8; 8; 2.5 SUSPENSION INTRAMUSCULAR at 11:47

## 2025-08-04 RX ADMIN — LIDOCAINE HYDROCHLORIDE 10 ML: 10 INJECTION, SOLUTION INFILTRATION; PERINEURAL at 11:47

## 2025-08-04 RX ADMIN — HYDROCODONE BITARTRATE AND ACETAMINOPHEN 1 TABLET: 5; 325 TABLET ORAL at 11:47

## 2025-08-04 RX ADMIN — ONDANSETRON 4 MG: 4 TABLET, ORALLY DISINTEGRATING ORAL at 11:47

## 2025-08-18 DIAGNOSIS — Z79.899 HIGH RISK MEDICATION USE: ICD-10-CM

## 2025-08-18 DIAGNOSIS — M05.79 RHEUMATOID ARTHRITIS WITH RHEUMATOID FACTOR OF MULTIPLE SITES WITHOUT ORGAN OR SYSTEMS INVOLVEMENT: ICD-10-CM

## 2025-08-18 RX ORDER — METHOTREXATE 2.5 MG/1
15 TABLET ORAL WEEKLY
Qty: 72 TABLET | Refills: 0 | Status: SHIPPED | OUTPATIENT
Start: 2025-08-18

## (undated) DEVICE — SINGLE USE MEDICAL DEVICE FOR OPHTHALMIC SURGERY: Brand: 23G ASP HANDPIECE ROUGH 12/BOX

## (undated) DEVICE — COUNT NDL FOAM STRIP W/MAG 60CT

## (undated) DEVICE — CANN HYDRODISSECTION

## (undated) DEVICE — TOWEL,OR,DSP,ST,BLUE,STD,4/PK,20PK/CS: Brand: MEDLINE

## (undated) DEVICE — Device

## (undated) DEVICE — CLEAR CORNEAL KNIFE 2.4MM ANG: Brand: SHARPOINT

## (undated) DEVICE — CANN IRR/INJ AIR ANT CHAMBER 6MM BEND 27G

## (undated) DEVICE — DRP SUP TRIDENT FACE

## (undated) DEVICE — GLV SURG BIOGEL M LTX PF 6 1/2

## (undated) DEVICE — EYE CARE POST OP KIT: Brand: MEDLINE INDUSTRIES, INC.

## (undated) DEVICE — 0.8MM CLEARPORT PARA KNIFE: Brand: SHARPOINT

## (undated) DEVICE — INTENDED FOR TISSUE SEPARATION, AND OTHER PROCEDURES THAT REQUIRE A SHARP SURGICAL BLADE TO PUNCTURE OR CUT.: Brand: BARD-PARKER ® CARBON RIB-BACK BLADES

## (undated) DEVICE — GLV SURG TRIUMPH MICRO PF LTX 7.5 STRL

## (undated) DEVICE — SINGLE USE MEDICAL DEVICE FOR OPHTHALMIC SURGERY: Brand: 23G IRR HANDPIECE SMOOTH 12B

## (undated) DEVICE — SPNG LAP 18X18IN LF STRL PK/5

## (undated) DEVICE — RICH MINOR LITHOTOMY: Brand: MEDLINE INDUSTRIES, INC.

## (undated) DEVICE — TUBING, SUCTION, 1/4" X 12', STRAIGHT: Brand: MEDLINE

## (undated) DEVICE — NDL HYPO ECLPS SFTY 22G 1 1/2IN

## (undated) DEVICE — SOL IRRIG H2O 1000ML STRL

## (undated) DEVICE — TRY SKINPREP PVP SCRB W PAINT

## (undated) DEVICE — FLEXIBLE YANKAUER,MEDIUM TIP, NO VACUUM CONTROL: Brand: ARGYLE

## (undated) DEVICE — THE MONARCH® "D" CARTRIDGE IS A SINGLE-USE POLYPROPYLENE CARTRIDGE FOR POSTERIOR CHAMBER IOL DELIVERY: Brand: MONARCH® III

## (undated) DEVICE — SPNG GZ WOVN 4X4IN 12PLY 10/BX STRL

## (undated) DEVICE — IRRIGATOR BULB ASEPTO 60CC STRL

## (undated) DEVICE — SYR LUERLOK 30CC

## (undated) DEVICE — PENCL ES MEGADINE EZ/CLEAN BUTN W/HOLSTR 10FT

## (undated) DEVICE — SUT VIC 3/0 SH 27IN J416H